# Patient Record
Sex: FEMALE | Race: WHITE | Employment: UNEMPLOYED | URBAN - METROPOLITAN AREA
[De-identification: names, ages, dates, MRNs, and addresses within clinical notes are randomized per-mention and may not be internally consistent; named-entity substitution may affect disease eponyms.]

---

## 2017-03-27 ENCOUNTER — TELEPHONE (OUTPATIENT)
Dept: CARDIOLOGY CLINIC | Age: 32
End: 2017-03-27

## 2017-03-27 ENCOUNTER — OFFICE VISIT (OUTPATIENT)
Dept: CARDIOLOGY CLINIC | Age: 32
End: 2017-03-27

## 2017-03-27 DIAGNOSIS — R07.9 CHEST PAIN, UNSPECIFIED TYPE: Primary | ICD-10-CM

## 2017-03-27 PROCEDURE — 99213 OFFICE O/P EST LOW 20 MIN: CPT | Performed by: INTERNAL MEDICINE

## 2017-03-27 PROCEDURE — 93000 ELECTROCARDIOGRAM COMPLETE: CPT | Performed by: INTERNAL MEDICINE

## 2017-04-04 ENCOUNTER — TELEPHONE (OUTPATIENT)
Dept: CARDIOLOGY CLINIC | Age: 32
End: 2017-04-04

## 2017-04-26 ENCOUNTER — OFFICE VISIT (OUTPATIENT)
Dept: BARIATRICS/WEIGHT MGMT | Age: 32
End: 2017-04-26

## 2017-04-26 VITALS
WEIGHT: 293 LBS | HEIGHT: 72 IN | DIASTOLIC BLOOD PRESSURE: 59 MMHG | BODY MASS INDEX: 39.68 KG/M2 | HEART RATE: 91 BPM | SYSTOLIC BLOOD PRESSURE: 127 MMHG

## 2017-04-26 DIAGNOSIS — F41.9 ANXIETY: ICD-10-CM

## 2017-04-26 DIAGNOSIS — M25.561 BILATERAL CHRONIC KNEE PAIN: ICD-10-CM

## 2017-04-26 DIAGNOSIS — M25.562 BILATERAL CHRONIC KNEE PAIN: ICD-10-CM

## 2017-04-26 DIAGNOSIS — E66.01 MORBID OBESITY WITH BMI OF 50.0-59.9, ADULT (HCC): Primary | ICD-10-CM

## 2017-04-26 DIAGNOSIS — M19.90 ARTHRITIS: ICD-10-CM

## 2017-04-26 DIAGNOSIS — F32.9 REACTIVE DEPRESSION: ICD-10-CM

## 2017-04-26 DIAGNOSIS — R06.02 SHORTNESS OF BREATH ON EXERTION: ICD-10-CM

## 2017-04-26 DIAGNOSIS — M54.50 CHRONIC BILATERAL LOW BACK PAIN WITHOUT SCIATICA: ICD-10-CM

## 2017-04-26 DIAGNOSIS — R11.2 NAUSEA AND VOMITING IN ADULT: ICD-10-CM

## 2017-04-26 DIAGNOSIS — G89.29 BILATERAL CHRONIC KNEE PAIN: ICD-10-CM

## 2017-04-26 DIAGNOSIS — M25.552 PAIN OF BOTH HIP JOINTS: ICD-10-CM

## 2017-04-26 DIAGNOSIS — G89.29 CHRONIC BILATERAL LOW BACK PAIN WITHOUT SCIATICA: ICD-10-CM

## 2017-04-26 DIAGNOSIS — G44.219 EPISODIC TENSION-TYPE HEADACHE, NOT INTRACTABLE: ICD-10-CM

## 2017-04-26 DIAGNOSIS — K59.03 DRUG-INDUCED CONSTIPATION: ICD-10-CM

## 2017-04-26 DIAGNOSIS — R19.7 DIARRHEA, UNSPECIFIED TYPE: ICD-10-CM

## 2017-04-26 DIAGNOSIS — F41.0 PANIC ATTACK: ICD-10-CM

## 2017-04-26 DIAGNOSIS — G47.33 OSA (OBSTRUCTIVE SLEEP APNEA): ICD-10-CM

## 2017-04-26 DIAGNOSIS — M25.551 PAIN OF BOTH HIP JOINTS: ICD-10-CM

## 2017-04-26 DIAGNOSIS — J45.20 MILD INTERMITTENT ASTHMA WITHOUT COMPLICATION: ICD-10-CM

## 2017-04-26 PROCEDURE — 99205 OFFICE O/P NEW HI 60 MIN: CPT | Performed by: SURGERY

## 2017-04-27 PROBLEM — K59.03 DRUG-INDUCED CONSTIPATION: Status: ACTIVE | Noted: 2017-04-27

## 2017-04-27 PROBLEM — F32.9 REACTIVE DEPRESSION: Status: ACTIVE | Noted: 2017-04-27

## 2017-04-27 PROBLEM — E66.01 MORBID OBESITY WITH BMI OF 50.0-59.9, ADULT (HCC): Status: ACTIVE | Noted: 2017-04-27

## 2017-04-27 PROBLEM — M19.90 ARTHRITIS: Status: ACTIVE | Noted: 2017-04-27

## 2017-04-27 PROBLEM — R06.02 SHORTNESS OF BREATH ON EXERTION: Status: ACTIVE | Noted: 2017-04-27

## 2017-04-27 PROBLEM — J45.20 MILD INTERMITTENT ASTHMA WITHOUT COMPLICATION: Status: ACTIVE | Noted: 2017-04-27

## 2017-04-27 PROBLEM — R19.7 DIARRHEA: Status: ACTIVE | Noted: 2017-04-27

## 2017-04-27 PROBLEM — G44.219 EPISODIC TENSION-TYPE HEADACHE, NOT INTRACTABLE: Status: ACTIVE | Noted: 2017-04-27

## 2017-04-27 PROBLEM — F41.9 ANXIETY: Status: ACTIVE | Noted: 2017-04-27

## 2017-04-27 PROBLEM — G89.29 BILATERAL CHRONIC KNEE PAIN: Status: ACTIVE | Noted: 2017-04-27

## 2017-04-27 PROBLEM — F41.0 PANIC ATTACK: Status: ACTIVE | Noted: 2017-04-27

## 2017-04-27 PROBLEM — M25.552 PAIN OF BOTH HIP JOINTS: Status: ACTIVE | Noted: 2017-04-27

## 2017-04-27 PROBLEM — M25.551 PAIN OF BOTH HIP JOINTS: Status: ACTIVE | Noted: 2017-04-27

## 2017-04-27 PROBLEM — M54.50 CHRONIC BILATERAL LOW BACK PAIN WITHOUT SCIATICA: Status: ACTIVE | Noted: 2017-04-27

## 2017-04-27 PROBLEM — M25.562 BILATERAL CHRONIC KNEE PAIN: Status: ACTIVE | Noted: 2017-04-27

## 2017-04-27 PROBLEM — R11.2 NAUSEA AND VOMITING IN ADULT: Status: ACTIVE | Noted: 2017-04-27

## 2017-04-27 PROBLEM — G89.29 CHRONIC BILATERAL LOW BACK PAIN WITHOUT SCIATICA: Status: ACTIVE | Noted: 2017-04-27

## 2017-04-27 PROBLEM — G47.33 OSA (OBSTRUCTIVE SLEEP APNEA): Status: ACTIVE | Noted: 2017-04-27

## 2017-04-27 PROBLEM — M25.561 BILATERAL CHRONIC KNEE PAIN: Status: ACTIVE | Noted: 2017-04-27

## 2017-04-28 ASSESSMENT — ENCOUNTER SYMPTOMS
ORTHOPNEA: 0
VOMITING: 0
STRIDOR: 0
DIARRHEA: 0
EYE DISCHARGE: 0
PHOTOPHOBIA: 0
SORE THROAT: 0
ABDOMINAL PAIN: 1
SPUTUM PRODUCTION: 0
COUGH: 0
HEMOPTYSIS: 0
BACK PAIN: 1
WHEEZING: 0
BLURRED VISION: 0
EYE REDNESS: 0
BLOOD IN STOOL: 0
HEARTBURN: 1
DOUBLE VISION: 0
EYE PAIN: 0
CONSTIPATION: 0
SHORTNESS OF BREATH: 1
NAUSEA: 1

## 2017-05-04 ENCOUNTER — PROCEDURE VISIT (OUTPATIENT)
Dept: CARDIOLOGY CLINIC | Age: 32
End: 2017-05-04

## 2017-05-04 DIAGNOSIS — R07.9 CHEST PAIN, UNSPECIFIED TYPE: Primary | ICD-10-CM

## 2017-05-04 PROCEDURE — 93015 CV STRESS TEST SUPVJ I&R: CPT | Performed by: INTERNAL MEDICINE

## 2017-05-11 ENCOUNTER — HOSPITAL ENCOUNTER (OUTPATIENT)
Dept: CARDIOLOGY | Age: 32
Discharge: HOME | End: 2017-06-09
Attending: INTERNAL MEDICINE | Admitting: INTERNAL MEDICINE

## 2017-07-13 ENCOUNTER — HOSPITAL ENCOUNTER (OUTPATIENT)
Dept: PHYSICAL THERAPY | Age: 32
Discharge: HOME | End: 2017-08-31
Attending: PAIN MEDICINE | Admitting: PAIN MEDICINE

## 2017-08-02 ENCOUNTER — OFFICE VISIT (OUTPATIENT)
Dept: BARIATRICS/WEIGHT MGMT | Age: 32
End: 2017-08-02

## 2017-08-02 VITALS
DIASTOLIC BLOOD PRESSURE: 59 MMHG | SYSTOLIC BLOOD PRESSURE: 109 MMHG | HEART RATE: 75 BPM | HEIGHT: 71 IN | BODY MASS INDEX: 41.02 KG/M2 | WEIGHT: 293 LBS | RESPIRATION RATE: 20 BRPM

## 2017-08-02 DIAGNOSIS — O09.299 PREGNANCY WITH POOR OBSTETRIC HISTORY: ICD-10-CM

## 2017-08-02 DIAGNOSIS — F41.9 ANXIETY: Primary | ICD-10-CM

## 2017-08-02 DIAGNOSIS — G44.219 EPISODIC TENSION-TYPE HEADACHE, NOT INTRACTABLE: ICD-10-CM

## 2017-08-02 DIAGNOSIS — R11.2 NAUSEA AND VOMITING IN ADULT: ICD-10-CM

## 2017-08-02 DIAGNOSIS — F41.0 PANIC ATTACK: ICD-10-CM

## 2017-08-02 DIAGNOSIS — M25.562 BILATERAL CHRONIC KNEE PAIN: ICD-10-CM

## 2017-08-02 DIAGNOSIS — M19.90 ARTHRITIS: ICD-10-CM

## 2017-08-02 DIAGNOSIS — O99.891 BACK PAIN IN PREGNANCY: ICD-10-CM

## 2017-08-02 DIAGNOSIS — R19.7 DIARRHEA, UNSPECIFIED TYPE: ICD-10-CM

## 2017-08-02 DIAGNOSIS — O99.213 OBESITY COMPLICATING PREGNANCY IN THIRD TRIMESTER: ICD-10-CM

## 2017-08-02 DIAGNOSIS — G89.29 BILATERAL CHRONIC KNEE PAIN: ICD-10-CM

## 2017-08-02 DIAGNOSIS — R06.02 SHORTNESS OF BREATH ON EXERTION: ICD-10-CM

## 2017-08-02 DIAGNOSIS — G47.33 OSA (OBSTRUCTIVE SLEEP APNEA): ICD-10-CM

## 2017-08-02 DIAGNOSIS — K59.03 DRUG-INDUCED CONSTIPATION: ICD-10-CM

## 2017-08-02 DIAGNOSIS — E66.01 MORBID OBESITY WITH BMI OF 50.0-59.9, ADULT (HCC): ICD-10-CM

## 2017-08-02 DIAGNOSIS — M25.551 PAIN OF BOTH HIP JOINTS: ICD-10-CM

## 2017-08-02 DIAGNOSIS — M25.561 BILATERAL CHRONIC KNEE PAIN: ICD-10-CM

## 2017-08-02 DIAGNOSIS — G89.29 CHRONIC BILATERAL LOW BACK PAIN WITHOUT SCIATICA: ICD-10-CM

## 2017-08-02 DIAGNOSIS — M54.50 CHRONIC BILATERAL LOW BACK PAIN WITHOUT SCIATICA: ICD-10-CM

## 2017-08-02 DIAGNOSIS — J45.20 MILD INTERMITTENT ASTHMA WITHOUT COMPLICATION: ICD-10-CM

## 2017-08-02 DIAGNOSIS — M54.9 BACK PAIN IN PREGNANCY: ICD-10-CM

## 2017-08-02 DIAGNOSIS — M25.552 PAIN OF BOTH HIP JOINTS: ICD-10-CM

## 2017-08-02 DIAGNOSIS — F32.9 REACTIVE DEPRESSION: ICD-10-CM

## 2017-08-02 DIAGNOSIS — R07.9 CHEST PAIN, UNSPECIFIED TYPE: ICD-10-CM

## 2017-08-02 PROCEDURE — 99214 OFFICE O/P EST MOD 30 MIN: CPT | Performed by: SURGERY

## 2017-08-02 ASSESSMENT — ENCOUNTER SYMPTOMS
ABDOMINAL PAIN: 1
SHORTNESS OF BREATH: 1
BACK PAIN: 1
ABDOMINAL DISTENTION: 1

## 2017-08-03 ASSESSMENT — PAIN DESCRIPTION - DESCRIPTORS: DESCRIPTORS: ACHING;RADIATING;BURNING

## 2017-08-03 ASSESSMENT — PAIN SCALES - GENERAL: PAINLEVEL_OUTOF10: 7

## 2017-08-03 ASSESSMENT — PAIN DESCRIPTION - LOCATION: LOCATION: NECK;BACK;SHOULDER

## 2017-08-03 ASSESSMENT — PAIN DESCRIPTION - ORIENTATION: ORIENTATION: RIGHT;LEFT;MID;LOWER

## 2017-08-03 ASSESSMENT — PAIN DESCRIPTION - PAIN TYPE: TYPE: CHRONIC PAIN

## 2017-08-03 ASSESSMENT — PAIN DESCRIPTION - FREQUENCY: FREQUENCY: CONTINUOUS

## 2017-08-22 ENCOUNTER — HOSPITAL ENCOUNTER (OUTPATIENT)
Dept: PHYSICAL THERAPY | Age: 32
Discharge: HOME | End: 2017-08-22
Attending: PAIN MEDICINE | Admitting: PAIN MEDICINE

## 2017-09-01 ENCOUNTER — HOSPITAL ENCOUNTER (OUTPATIENT)
Dept: OTHER | Age: 32
Discharge: HOME | End: 2017-09-08
Attending: PAIN MEDICINE | Admitting: PAIN MEDICINE

## 2017-09-08 ENCOUNTER — OFFICE VISIT (OUTPATIENT)
Dept: BARIATRICS/WEIGHT MGMT | Age: 32
End: 2017-09-08

## 2017-09-08 VITALS
SYSTOLIC BLOOD PRESSURE: 122 MMHG | WEIGHT: 293 LBS | BODY MASS INDEX: 41.02 KG/M2 | HEIGHT: 71 IN | HEART RATE: 76 BPM | DIASTOLIC BLOOD PRESSURE: 58 MMHG

## 2017-09-08 VITALS
HEART RATE: 76 BPM | HEIGHT: 71 IN | DIASTOLIC BLOOD PRESSURE: 58 MMHG | WEIGHT: 293 LBS | BODY MASS INDEX: 41.02 KG/M2 | SYSTOLIC BLOOD PRESSURE: 122 MMHG

## 2017-09-08 DIAGNOSIS — E66.01 MORBID OBESITY WITH BMI OF 50.0-59.9, ADULT (HCC): Primary | ICD-10-CM

## 2017-09-08 DIAGNOSIS — Z72.0 TOBACCO ABUSE: ICD-10-CM

## 2017-09-08 DIAGNOSIS — F32.9 REACTIVE DEPRESSION: ICD-10-CM

## 2017-09-08 DIAGNOSIS — G47.33 OSA (OBSTRUCTIVE SLEEP APNEA): ICD-10-CM

## 2017-09-08 DIAGNOSIS — Z01.818 PRE-OP EVALUATION: ICD-10-CM

## 2017-09-08 DIAGNOSIS — M54.50 CHRONIC BILATERAL LOW BACK PAIN WITHOUT SCIATICA: ICD-10-CM

## 2017-09-08 DIAGNOSIS — G89.29 CHRONIC BILATERAL LOW BACK PAIN WITHOUT SCIATICA: ICD-10-CM

## 2017-09-08 PROCEDURE — 99999 PR OFFICE/OUTPT VISIT,PROCEDURE ONLY: CPT

## 2017-09-08 PROCEDURE — 99214 OFFICE O/P EST MOD 30 MIN: CPT | Performed by: NURSE PRACTITIONER

## 2017-09-08 RX ORDER — CLONIDINE HYDROCHLORIDE 0.1 MG/1
1 TABLET ORAL NIGHTLY
COMMUNITY
Start: 2017-08-23 | End: 2017-12-16

## 2017-09-08 ASSESSMENT — ENCOUNTER SYMPTOMS
WHEEZING: 0
ABDOMINAL PAIN: 0
BACK PAIN: 1
RHINORRHEA: 0
ABDOMINAL DISTENTION: 0
NAUSEA: 0
CHEST TIGHTNESS: 0
DIARRHEA: 0
SHORTNESS OF BREATH: 0
TROUBLE SWALLOWING: 0
EYE PAIN: 0

## 2017-11-06 ENCOUNTER — OFFICE VISIT (OUTPATIENT)
Dept: BARIATRICS/WEIGHT MGMT | Age: 32
End: 2017-11-06

## 2017-11-06 VITALS
HEIGHT: 72 IN | SYSTOLIC BLOOD PRESSURE: 138 MMHG | DIASTOLIC BLOOD PRESSURE: 90 MMHG | OXYGEN SATURATION: 96 % | HEART RATE: 75 BPM | WEIGHT: 293 LBS | BODY MASS INDEX: 39.68 KG/M2

## 2017-11-06 DIAGNOSIS — Z01.818 PREOPERATIVE EVALUATION TO RULE OUT SURGICAL CONTRAINDICATION: ICD-10-CM

## 2017-11-06 DIAGNOSIS — Z72.0 TOBACCO ABUSE: ICD-10-CM

## 2017-11-06 DIAGNOSIS — G47.33 OBSTRUCTIVE SLEEP APNEA: ICD-10-CM

## 2017-11-06 DIAGNOSIS — E66.01 MORBID OBESITY WITH BMI OF 50.0-59.9, ADULT (HCC): Primary | ICD-10-CM

## 2017-11-06 DIAGNOSIS — M54.50 CHRONIC BILATERAL LOW BACK PAIN WITHOUT SCIATICA: ICD-10-CM

## 2017-11-06 DIAGNOSIS — G89.29 CHRONIC BILATERAL LOW BACK PAIN WITHOUT SCIATICA: ICD-10-CM

## 2017-11-06 DIAGNOSIS — F32.9 REACTIVE DEPRESSION: ICD-10-CM

## 2017-11-06 PROCEDURE — 99214 OFFICE O/P EST MOD 30 MIN: CPT | Performed by: NURSE PRACTITIONER

## 2017-11-06 PROCEDURE — 4004F PT TOBACCO SCREEN RCVD TLK: CPT | Performed by: NURSE PRACTITIONER

## 2017-11-06 PROCEDURE — G8417 CALC BMI ABV UP PARAM F/U: HCPCS | Performed by: NURSE PRACTITIONER

## 2017-11-06 PROCEDURE — G8427 DOCREV CUR MEDS BY ELIG CLIN: HCPCS | Performed by: NURSE PRACTITIONER

## 2017-11-06 PROCEDURE — G8484 FLU IMMUNIZE NO ADMIN: HCPCS | Performed by: NURSE PRACTITIONER

## 2017-11-06 RX ORDER — CITALOPRAM 10 MG/1
10 TABLET ORAL DAILY
COMMUNITY
End: 2017-12-16

## 2017-11-06 ASSESSMENT — ENCOUNTER SYMPTOMS
NAUSEA: 0
CHEST TIGHTNESS: 0
ABDOMINAL PAIN: 0
SHORTNESS OF BREATH: 0
ANAL BLEEDING: 0
ABDOMINAL DISTENTION: 0
COUGH: 0
CHOKING: 0
EYE PAIN: 0
COLOR CHANGE: 0
DIARRHEA: 0
EYE DISCHARGE: 0
BACK PAIN: 1
CONSTIPATION: 0
EYE ITCHING: 0
TROUBLE SWALLOWING: 0

## 2017-11-06 NOTE — PROGRESS NOTES
BARIATRIC SURGERY OFFICE NOTE    SUBJECTIVE:    Patient presenting today referred from Delfino Barrera MD, for   Chief Complaint   Patient presents with   [facility] Weight Management     4th visit    Other     needs new referral to Psychologist- Patient unhappy with current provider   . Vitals:    11/06/17 1437   BP: (!) 138/90   Pulse: 75   SpO2: 96%        BMI: Body mass index is 55.13 kg/m². Obesity Classification: III Morbid Obesity. Weight History: Wt Readings from Last 3 Encounters:   11/06/17 (!) 406 lb 8 oz (184.4 kg)   09/08/17 (!) 410 lb 9.6 oz (186.2 kg)   09/08/17 (!) 410 lb 9.6 oz (186.2 kg)       HPI: Aiden Jacques is a 33 y.o. female presenting in fourth bariatric visit, follow up diet and exercise - pre-operative weight loss, in consideration for bariatric surgery. Total weight loss/gain -3.9 Lbs over one month. Weight History: Wt Readings from Last 3 Encounters:   11/06/17 (!) 406 lb 8 oz (184.4 kg)   09/08/17 (!) 410 lb 9.6 oz (186.2 kg)   09/08/17 (!) 410 lb 9.6 oz (186.2 kg)     Total weight loss 8.9 Lbs over 2 month. Patient dines out to a sit down restaurant 1 times per month. Patient eats fast food meals 4 times per week. Drinks mostly Coffee, soda    24 hour recall/food frequency chart:  Breakfast: 1 pancake, without syrup  Snack: chicken nugget  Lunch: none  Snack: candy bar  Dinner: tacos  Snack: none    Total daily calories: 2,000 or more      She has stopped drinking [employer] and drinking diet soda (three or four daily). Drinking more water at least 3-4 bottles. No grazing; occasional snacking with candy bar when she gets a sweet tooth. She has a hard time remembering to keep food diary. Exercises 60 mins everyday, \"chasing her two kids\". Recently started new job working at [employer]. She missed her Pulmonology apt on 10/18 and is pending reschedule with Dr. Paul Hickman  Cardiology stress test done and still needs to do the EKG for Cardiology clearance.   Labs ordered by Dr. Jose Guadalupe Cano in September but patient forgot to get them done. Her calorie goal is 1500. Struggled with doing the meals preparation and trying not to skip meals. Reports having a bad habit of eating late at night and going to bed. She continues to smoke a half pack per day. Offered smoking cessation classes and she declined. She has tried nicotine patches and gum with poor success. She is working on weaning off. Anxiety med/ ADHD med has been stopped by Psychologist at [facility]. On low dose of Celexa. She feels okay but still feels fidgety, nervous and jittery. Occasional panic attacks. Thoroughly reviewed the patient's medical history, family history, social history and review of systems with the patient today in the office. Please see medical record for pertinent positives.       Past Medical History:   Diagnosis Date    Anxiety     Anxiety disorder     Asthma     B[facility] [address] [address]     Depression     Drug addiction ([facility] [address]     states previous addiction to vicodin, denies current use    Headache(784.0)     History of exercise stress test 2017    treadmill    Miscarriage     x4      Patient Active Problem List   Diagnosis    Pregnancy with poor obstetric history    Obesity complicating pregnancy in third trimester    Back pain in pregnancy    Chest pain    Morbid obesity with BMI of 50.0-59.9, adult ([address]    Mild intermittent asthma without complication    Shortness of breath on exertion    BASHIR (obstructive sleep apnea)    Drug-induced constipation    Diarrhea    Arthritis    Bilateral chronic knee pain    Pain of both hip joints    Chronic bilateral low back pain without sciatica    Episodic tension-type headache, not intractable    Nausea and vomiting in adult    Reactive depression    Anxiety    Panic attack     Past Surgical History:   Procedure Laterality Date     SECTION      DILATION AND CURETTAGE OF UTERUS      TONSILLECTOMY      urinating, dysuria and frequency. Musculoskeletal: Positive for arthralgias, back pain and myalgias. Skin: Negative for color change, pallor and rash. Allergic/Immunologic: Negative for environmental allergies and food allergies. Neurological: Negative for dizziness, tremors, seizures and headaches. Hematological: Negative for adenopathy. Does not bruise/bleed easily. Psychiatric/Behavioral: Negative for behavioral problems, self-injury and suicidal ideas. The patient is nervous/anxious. OBJECTIVE:    BP (!) 138/90 (Site: Left Arm, Position: Sitting, Cuff Size: Large Adult)   Pulse 75   Ht 6' (1.829 m)   Wt (!) 406 lb 8 oz (184.4 kg)   SpO2 96%   BMI 55.13 kg/m²      Physical Exam   Constitutional: She is oriented to person, place, and time. She appears well-developed and well-nourished. HENT:   Head: Normocephalic and atraumatic. Eyes: Conjunctivae and EOM are normal. Pupils are equal, round, and reactive to light. Neck: Normal range of motion. Neck supple. No JVD present. No tracheal deviation present. No thyromegaly present. Cardiovascular: Normal rate, regular rhythm, normal heart sounds and intact distal pulses. Exam reveals no gallop and no friction rub. No murmur heard. Pulmonary/Chest: Effort normal and breath sounds normal. No respiratory distress. She has no wheezes. She has no rales. She exhibits no tenderness. Abdominal: Soft. Bowel sounds are normal. She exhibits no distension and no mass. There is no tenderness. There is no rebound and no guarding. Obese   Musculoskeletal: Normal range of motion. Lymphadenopathy:     She has no cervical adenopathy. Neurological: She is alert and oriented to person, place, and time. Skin: Skin is warm and dry. Psychiatric: She has a normal mood and affect. Vitals reviewed. ASSESSMENT & PLAN:    1. Morbid obesity with BMI of 50.0-59.9, adult ([facility] [address]  - Discussed the below recommendations in detail with patient.    - Needs to work on calorie counting with goal of 1500  - Goals will be no pop/increase water intake and tobacco cessation.   - Need improved compliance with labs and Pulmonology apt  - Follow up in 1 month.      2. Tobacco abuse  -Patient counseled in length on smoking cessation including benefits of quitting and health risks of continuing to smoke including but not limited to lung cancer, COPD, risk of coronary artery disease. Patient verbalized understanding today. Does not want any interventions at this time. - Given [employer] handout.   - Aware needs to be smoke free AT LEAST 3 months prior to surgery.      3. BASHIR (obstructive sleep apnea)  - Will need pulmonary clearance. 4. Chronic bilateral low back pain without sciatica  - Chronic and needs weight loss. - Followed per PCP.      5. Reactive depression  - Continue with weight loss. - Managed per PCP. - Will need psych clearance in the future. There are no diagnoses linked to this encounter. Patient was encouraged to journal all food intake. Keep calorie level at approximately 7595-7347. Protein intake is to be a minimum of 40-50 grams per day. Water drinking was encouraged with a goal of 64oz-128oz daily. Beverages to be calorie free except for milk and avoid soda. Continue to increase level of physical activity. I spent 25 minutes with the patient face to face today and over 50% of the office visit today was spent in face to face counseling regarding diet and exercise, in preparation for her planned Robotic Sleeve Gastrectomy. Discussed in length complying with the dietary recommendations, complying with the preoperative workup including dietary counseling, exercise physiologist counseling, and pre-operative optimization of pulmonologist and cardiologist.  The patient expressed understanding and willingness to comply nicely; all questions and concerns addressed. No orders of the defined types were placed in this encounter.     No orders of the defined types were placed in this encounter. Follow Up:  No Follow-up on file.     Electronically signed by Abimael Fraga CNP on 11/6/2017 at 2:51 PM    Total daily calories: 2,000 or more      Exercises 60 mins everyday

## 2017-11-07 ENCOUNTER — TELEPHONE (OUTPATIENT)
Dept: BARIATRICS/WEIGHT MGMT | Age: 32
End: 2017-11-07

## 2017-11-07 DIAGNOSIS — Z01.818 PREOP TESTING: Primary | ICD-10-CM

## 2017-11-07 NOTE — TELEPHONE ENCOUNTER
Please notify patient that her labs have been ordered. Must be fasting prior to lab draw. Thank you.

## 2018-01-05 ENCOUNTER — OFFICE VISIT (OUTPATIENT)
Dept: BARIATRICS/WEIGHT MGMT | Age: 33
End: 2018-01-05

## 2018-01-05 VITALS
HEIGHT: 72 IN | HEART RATE: 79 BPM | DIASTOLIC BLOOD PRESSURE: 68 MMHG | SYSTOLIC BLOOD PRESSURE: 142 MMHG | WEIGHT: 293 LBS | BODY MASS INDEX: 39.68 KG/M2

## 2018-01-05 DIAGNOSIS — I15.9 SECONDARY HYPERTENSION: ICD-10-CM

## 2018-01-05 DIAGNOSIS — E66.01 MORBID OBESITY WITH BMI OF 50.0-59.9, ADULT (HCC): Primary | ICD-10-CM

## 2018-01-05 DIAGNOSIS — N39.3 SUI (STRESS URINARY INCONTINENCE, FEMALE): ICD-10-CM

## 2018-01-05 DIAGNOSIS — Z72.0 TOBACCO ABUSE: ICD-10-CM

## 2018-01-05 DIAGNOSIS — F41.9 ANXIETY: ICD-10-CM

## 2018-01-05 DIAGNOSIS — F32.9 REACTIVE DEPRESSION: ICD-10-CM

## 2018-01-05 PROCEDURE — G8427 DOCREV CUR MEDS BY ELIG CLIN: HCPCS | Performed by: NURSE PRACTITIONER

## 2018-01-05 PROCEDURE — 99214 OFFICE O/P EST MOD 30 MIN: CPT | Performed by: NURSE PRACTITIONER

## 2018-01-05 PROCEDURE — 4004F PT TOBACCO SCREEN RCVD TLK: CPT | Performed by: NURSE PRACTITIONER

## 2018-01-05 PROCEDURE — G8417 CALC BMI ABV UP PARAM F/U: HCPCS | Performed by: NURSE PRACTITIONER

## 2018-01-05 PROCEDURE — G8484 FLU IMMUNIZE NO ADMIN: HCPCS | Performed by: NURSE PRACTITIONER

## 2018-01-05 ASSESSMENT — ENCOUNTER SYMPTOMS
DIARRHEA: 0
ABDOMINAL PAIN: 0
EYE PAIN: 0
ABDOMINAL DISTENTION: 0
CHEST TIGHTNESS: 0
NAUSEA: 0
WHEEZING: 0
SHORTNESS OF BREATH: 0
BACK PAIN: 1
RHINORRHEA: 0
CONSTIPATION: 1
TROUBLE SWALLOWING: 0

## 2018-01-05 NOTE — PROGRESS NOTES
BARIATRIC SURGERY OFFICE NOTE    SUBJECTIVE:    Patient presenting today referred from Demetrice Billy MD, for   Chief Complaint   Patient presents with    Follow-up     Patient is here for 5th follow up visit for diet, exercise and weight loss. .    Vitals:    01/05/18 1302   BP: (!) 142/68   Pulse: 79        BMI: Body mass index is 55.57 kg/m². Obesity Classification: III Morbid Obesity. Weight History: Wt Readings from Last 3 Encounters:   01/05/18 (!) 409 lb 11.2 oz (185.8 kg)   12/16/17 (!) 406 lb (184.2 kg)   11/06/17 (!) 406 lb 8 oz (184.4 kg)       HPI: Patient is here for their fifth bariatric visit, follow up diet and exercise - pre-operative weight loss, in consideration for bariatric surgery. BMI: Body mass index is 55.57 kg/m². Obesity Classification: Class III Morbid Obesity. Total weight loss/gain +3.2 Lbs over 2 month. Patient dines out to a sit down restaurant 0 times per week. Patient eats fast food meals 1 times per day. Drinks mostly water and diet pop. 24 hour recall/food frequency chart:  Breakfast: none  Snack: none  Lunch: chicken sandwich  Snack: none  Dinner: burrito  Snack: none    Total daily calories: doesn't know, it's hard to keep track of them      Exercises:  No    Continues to smoke 1/2 PPD. Has not seen psych, continues to struggle with anxiety. No SI/HI but becomes very agitated at times. Recently started working at [employer]. Struggles with stress urinary incontinence- coughing/sneezing. HTN noted today, no HA, SOB or CP. Thoroughly reviewed the patient's medical history, family history, social history and review of systems with the patient today in the office. Please see medical record for pertinent positives.       Past Medical History:   Diagnosis Date    Anxiety     Anxiety disorder     Asthma     Bipolar disorder ([facility] [address]     Depression     Drug addiction ([facility] [address]     states previous addiction to vicodin, denies current use    TTFSUGMZ(938.7)     History of exercise stress test 2017    treadmill    Miscarriage     x4      Patient Active Problem List   Diagnosis    Pregnancy with poor obstetric history    Obesity complicating pregnancy in third trimester    Back pain in pregnancy    Chest pain    Morbid obesity with BMI of 50.0-59.9, adult ([facility] [address]    Mild intermittent asthma without complication    Shortness of breath on exertion    BASHIR (obstructive sleep apnea)    Drug-induced constipation    Diarrhea    Arthritis    Bilateral chronic knee pain    Pain of both hip joints    Chronic bilateral low back pain without sciatica    Episodic tension-type headache, not intractable    Nausea and vomiting in adult    Reactive depression    Anxiety    Panic attack    Secondary hypertension    GOGO (stress urinary incontinence, female)     Past Surgical History:   Procedure Laterality Date     SECTION      DILATION AND CURETTAGE OF UTERUS      TONSILLECTOMY      TOOTH EXTRACTION      TUBAL LIGATION       Current Outpatient Prescriptions   Medication Sig Dispense Refill    albuterol sulfate HFA (PROVENTIL HFA) 108 (90 Base) MCG/ACT inhaler Inhale 2 puffs into the lungs every 4 hours as needed for Wheezing or Shortness of Breath With spacer (and mask if indicated). Thanks.  1 Inhaler 1    Pseudoephedrine-DM-GG 30- MG CAPS Take 1 tablet by mouth 2 times daily as needed (cough or congestion) 20 capsule 0    naproxen (NAPROSYN) 500 MG tablet Take 1 tablet by mouth 2 times daily as needed for Pain 20 tablet 0    ondansetron (ZOFRAN) 4 MG tablet Take 1 tablet by mouth every 8 hours as needed for Nausea 10 tablet 0    ibuprofen (ADVIL;MOTRIN) 600 MG tablet Take 1 tablet by mouth every 6 hours as needed for Pain or Fever 60 tablet 0    butalbital-acetaminophen-caffeine (FIORICET) -40 MG per tablet Take 1 tablet by mouth every 4 hours as needed for Headaches 10 tablet 0    lidocaine (LIDODERM) 5 % depression  - secondary to life stressors and weight.   - Encouraged psych referral and further management. - Amb External Referral To Psychology    5. GOGO (stress urinary incontinence, female)  - Ongoing GOGO with coughing/sneezing.   - Discussed importance of weight loss and improvement in symptoms. 6. Secondary hypertension  - HTN Noted today at visit. - No HA, SOB or CP.   - Instructed to monitor and follow up with PCP regarding medication management. Patient was encouraged to journal all food intake. Keep calorie level at approximately [id number]. Protein intake is to be a minimum of 40-50 grams per day. Water drinking was encouraged with a goal of 64oz-128oz daily. Beverages to be calorie free except for milk and avoid soda. Continue to increase level of physical activity. I spent 25 minutes with the patient face to face today and over 50% of the office visit today was spent in face to face counseling regarding diet and exercise, in preparation for her planned Robotic Sleeve Gastrectomy. Discussed in length complying with the dietary recommendations, complying with the preoperative workup including dietary counseling completed, exercise physiologist counseling completed, and pre-operative optimization of pulmonologist which patient still needs and cardiologist appt. Needed as well. The patient expressed understanding and willingness to comply nicely; all questions and concerns addressed. No orders of the defined types were placed in this encounter. Orders Placed This Encounter   Procedures    Amb External Referral To Psychology     Referral Priority:   Routine     Referral Reason:   Specialty Services Required     Referred to Provider:   Miguelito Nielson     Requested Specialty:   Psychology     Number of Visits Requested:   1       Follow Up:  Return in about 1 month (around 2/5/2018).     Claudean Jan, CNP

## 2018-01-29 ENCOUNTER — TELEPHONE (OUTPATIENT)
Dept: BARIATRICS/WEIGHT MGMT | Age: 33
End: 2018-01-29

## 2018-02-19 ENCOUNTER — OFFICE VISIT (OUTPATIENT)
Dept: BARIATRICS/WEIGHT MGMT | Age: 33
End: 2018-02-19

## 2018-02-19 VITALS
BODY MASS INDEX: 41.02 KG/M2 | SYSTOLIC BLOOD PRESSURE: 138 MMHG | WEIGHT: 293 LBS | HEART RATE: 85 BPM | HEIGHT: 71 IN | DIASTOLIC BLOOD PRESSURE: 74 MMHG

## 2018-02-19 DIAGNOSIS — I15.9 SECONDARY HYPERTENSION: ICD-10-CM

## 2018-02-19 DIAGNOSIS — E66.01 MORBID OBESITY WITH BMI OF 50.0-59.9, ADULT (HCC): Primary | ICD-10-CM

## 2018-02-19 DIAGNOSIS — Z72.0 TOBACCO USE: ICD-10-CM

## 2018-02-19 PROCEDURE — G8484 FLU IMMUNIZE NO ADMIN: HCPCS | Performed by: NURSE PRACTITIONER

## 2018-02-19 PROCEDURE — G8417 CALC BMI ABV UP PARAM F/U: HCPCS | Performed by: NURSE PRACTITIONER

## 2018-02-19 PROCEDURE — 99214 OFFICE O/P EST MOD 30 MIN: CPT | Performed by: NURSE PRACTITIONER

## 2018-02-19 PROCEDURE — G8427 DOCREV CUR MEDS BY ELIG CLIN: HCPCS | Performed by: NURSE PRACTITIONER

## 2018-02-19 PROCEDURE — 4004F PT TOBACCO SCREEN RCVD TLK: CPT | Performed by: NURSE PRACTITIONER

## 2018-02-19 ASSESSMENT — ENCOUNTER SYMPTOMS
BACK PAIN: 1
RHINORRHEA: 0
ABDOMINAL DISTENTION: 0
TROUBLE SWALLOWING: 0
CHEST TIGHTNESS: 0
DIARRHEA: 0
EYE PAIN: 0
WHEEZING: 0
NAUSEA: 0
SHORTNESS OF BREATH: 0
ABDOMINAL PAIN: 0

## 2018-02-19 NOTE — PROGRESS NOTES
 Chest pain    Morbid obesity with BMI of 50.0-59.9, adult ([facility])    Mild intermittent asthma without complication    Shortness of breath on exertion    BASHIR (obstructive sleep apnea)    Drug-induced constipation    Diarrhea    Arthritis    Bilateral chronic knee pain    Pain of both hip joints    Chronic bilateral low back pain without sciatica    Episodic tension-type headache, not intractable    Nausea and vomiting in adult    Reactive depression    Anxiety    Panic attack    Secondary hypertension    GOGO (stress urinary incontinence, female)     Past Surgical History:   Procedure Laterality Date     SECTION      DILATION AND CURETTAGE OF UTERUS      TONSILLECTOMY  2002    TOOTH EXTRACTION      TUBAL LIGATION       Current Outpatient Prescriptions   Medication Sig Dispense Refill    ondansetron (ZOFRAN) 4 MG tablet Take 1 tablet by mouth every 8 hours as needed for Nausea 10 tablet 0    ibuprofen (ADVIL;MOTRIN) 600 MG tablet Take 1 tablet by mouth every 6 hours as needed for Pain or Fever 60 tablet 0    butalbital-acetaminophen-caffeine (FIORICET) -40 MG per tablet Take 1 tablet by mouth every 4 hours as needed for Headaches 10 tablet 0    lidocaine (LIDODERM) 5 % Place 1 patch onto the skin daily 12 hours on, 12 hours off.  gabapentin (NEURONTIN) 300 MG capsule Take 300 mg by mouth 3 times daily      oxyCODONE-acetaminophen (PERCOCET) 5-325 MG per tablet Take 1 tablet by mouth every 4 hours as needed for Pain      predniSONE (DELTASONE) 10 MG tablet 5 tablets PO Qday X 3, then 4 tablets PO Qday X 3, then 3 tablets PO Qday X 3, then 2 tablets PO Qday X 3, then 1 tablets PO Qday X 3, then stop. 45 tablet 0    albuterol sulfate HFA (PROVENTIL HFA) 108 (90 Base) MCG/ACT inhaler Inhale 2 puffs into the lungs every 4 hours as needed for Wheezing or Shortness of Breath With spacer (and mask if indicated). Thanks.  1 Inhaler 1    Pseudoephedrine-DM-GG 30- MG CAPS light.   Neck: Normal range of motion. Cardiovascular: Normal rate, regular rhythm and normal heart sounds. Pulmonary/Chest: Effort normal and breath sounds normal. She has no decreased breath sounds. She has no wheezes. She has no rhonchi. She has no rales. Abdominal: Soft. Bowel sounds are normal. There is no tenderness. Musculoskeletal: Normal range of motion. She exhibits no tenderness. In all 4 extremities. Neurological: She is alert and oriented to person, place, and time. She has normal strength. She exhibits normal muscle tone. GCS eye subscore is 4. GCS verbal subscore is 5. GCS motor subscore is 6. Skin: Skin is warm and dry. No rash noted. Psychiatric: She has a normal mood and affect. Her behavior is normal. Judgment normal.   Nursing note and vitals reviewed. ASSESSMENT & PLAN:    1. Morbid obesity with BMI of 50.0-59.9, adult ([facility] [address]  - Has lost significant amount of weight since last visit. - Choosing healthier items at work. - Still skipping meals- counseled. - re-iterated the below recommendations. 2. Tobacco use  - Smoking 1/2 ppd.   - Continues to smoke- discussed smoking cessation and importance prior to surgery. - Appears receptive to information but will not quit. - Again offered mediation support- declined. 3. Secondary hypertension  - Stable today at visit. - No Edema, SOB or headaches. Patient was encouraged to continue making better food choices and NOT skip meals. Protein intake is to be a minimum of 40-50 grams per day. Water drinking was encouraged with a goal of 64oz-128oz daily. Beverages to be calorie free except for milk and avoid soda. Continue to increase level of physical activity as tolerated. I spent 25 minutes with the patient face to face today and over 50% of the office visit today was spent in face to face counseling regarding diet and exercise, in preparation for her planned Robotic Sleeve Gastrectomy.   Discussed in length complying with the dietary recommendations, complying with the preoperative workup including dietary counseling completed, exercise physiologist counseling completed, and pre-operative optimization of pulmonologist in process and cardiologist in process- patient slow to follow up and schedule for clearances prior to surgery. The patient expressed understanding and willingness to comply nicely; all questions and concerns addressed. No orders of the defined types were placed in this encounter. No orders of the defined types were placed in this encounter. Follow Up:  Return in about 1 month (around 3/19/2018).     Travis Snowden, CNP

## 2018-02-20 ENCOUNTER — HOSPITAL ENCOUNTER (OUTPATIENT)
Dept: MRI IMAGING | Age: 33
Discharge: HOME | End: 2018-02-20
Attending: ORTHOPAEDIC SURGERY | Admitting: ORTHOPAEDIC SURGERY

## 2018-02-20 DIAGNOSIS — M25.461 EFFUSION OF RIGHT KNEE: ICD-10-CM

## 2018-02-20 DIAGNOSIS — M25.461 SWELLING OF RIGHT KNEE JOINT: ICD-10-CM

## 2018-03-26 ENCOUNTER — OFFICE VISIT (OUTPATIENT)
Dept: BARIATRICS/WEIGHT MGMT | Age: 33
End: 2018-03-26

## 2018-03-26 VITALS
BODY MASS INDEX: 41.02 KG/M2 | WEIGHT: 293 LBS | HEIGHT: 71 IN | SYSTOLIC BLOOD PRESSURE: 142 MMHG | DIASTOLIC BLOOD PRESSURE: 70 MMHG | HEART RATE: 72 BPM

## 2018-03-26 DIAGNOSIS — F41.9 ANXIETY: ICD-10-CM

## 2018-03-26 DIAGNOSIS — R10.11 RUQ PAIN: ICD-10-CM

## 2018-03-26 DIAGNOSIS — E66.01 MORBID OBESITY WITH BMI OF 50.0-59.9, ADULT (HCC): Primary | ICD-10-CM

## 2018-03-26 PROCEDURE — 4004F PT TOBACCO SCREEN RCVD TLK: CPT | Performed by: NURSE PRACTITIONER

## 2018-03-26 PROCEDURE — G8484 FLU IMMUNIZE NO ADMIN: HCPCS | Performed by: NURSE PRACTITIONER

## 2018-03-26 PROCEDURE — G8427 DOCREV CUR MEDS BY ELIG CLIN: HCPCS | Performed by: NURSE PRACTITIONER

## 2018-03-26 PROCEDURE — G8417 CALC BMI ABV UP PARAM F/U: HCPCS | Performed by: NURSE PRACTITIONER

## 2018-03-26 PROCEDURE — 99213 OFFICE O/P EST LOW 20 MIN: CPT | Performed by: NURSE PRACTITIONER

## 2018-03-26 RX ORDER — FEEDER CONTAINER WITH PUMP SET
1 EACH MISCELLANEOUS DAILY
Qty: 32 CAN | Refills: 0 | Status: SHIPPED | OUTPATIENT
Start: 2018-03-26 | End: 2019-10-23

## 2018-03-26 ASSESSMENT — ENCOUNTER SYMPTOMS
ABDOMINAL DISTENTION: 0
RHINORRHEA: 0
WHEEZING: 0
DIARRHEA: 0
TROUBLE SWALLOWING: 0
EYE PAIN: 0
CHEST TIGHTNESS: 0
ABDOMINAL PAIN: 0
SHORTNESS OF BREATH: 0
NAUSEA: 0

## 2018-03-26 NOTE — PROGRESS NOTES
Patient dines out to a sit down restaurant 1 times per month. Patient eats fast food meals 2 times per week.       Drinks mostly water    24 hour recall/food frequency chart:  Breakfast: none  Snack: none  Lunch: hot dog  Snack: none  Dinner: tostada  Snack: none    Total daily calories: not calculating      Exercises : active with work and children
obstetric history    Obesity complicating pregnancy in third trimester    Back pain in pregnancy    Chest pain    Morbid obesity with BMI of 50.0-59.9, adult ([facility]    Mild intermittent asthma without complication    Shortness of breath on exertion    BASHIR (obstructive sleep apnea)    Drug-induced constipation    Diarrhea    Arthritis    Bilateral chronic knee pain    Pain of both hip joints    Chronic bilateral low back pain without sciatica    Episodic tension-type headache, not intractable    Nausea and vomiting in adult    Reactive depression    Anxiety    Panic attack    Secondary hypertension    GOGO (stress urinary incontinence, female)     Past Surgical History:   Procedure Laterality Date     SECTION      DILATION AND CURETTAGE OF UTERUS      TONSILLECTOMY  2002    TOOTH EXTRACTION      TUBAL LIGATION       Current Outpatient Prescriptions   Medication Sig Dispense Refill    Nutritional Supplements (ENSURE HIGH PROTEIN) LIQD Take 1 Can by mouth daily Replace 1 meal per day 32 Can 0    ondansetron (ZOFRAN) 4 MG tablet Take 1 tablet by mouth every 8 hours as needed for Nausea 10 tablet 0    ibuprofen (ADVIL;MOTRIN) 600 MG tablet Take 1 tablet by mouth every 6 hours as needed for Pain or Fever 60 tablet 0    butalbital-acetaminophen-caffeine (FIORICET) -40 MG per tablet Take 1 tablet by mouth every 4 hours as needed for Headaches 10 tablet 0    lidocaine (LIDODERM) 5 % Place 1 patch onto the skin daily 12 hours on, 12 hours off.  gabapentin (NEURONTIN) 300 MG capsule Take 300 mg by mouth 3 times daily      oxyCODONE-acetaminophen (PERCOCET) 5-325 MG per tablet Take 1 tablet by mouth every 4 hours as needed for Pain      predniSONE (DELTASONE) 10 MG tablet 5 tablets PO Qday X 3, then 4 tablets PO Qday X 3, then 3 tablets PO Qday X 3, then 2 tablets PO Qday X 3, then 1 tablets PO Qday X 3, then stop.  45 tablet 0    Pseudoephedrine-DM-GG 30- MG CAPS Take 1

## 2018-05-10 ENCOUNTER — OFFICE VISIT (OUTPATIENT)
Dept: BARIATRICS/WEIGHT MGMT | Age: 33
End: 2018-05-10

## 2018-05-10 VITALS
BODY MASS INDEX: 41.02 KG/M2 | HEIGHT: 71 IN | WEIGHT: 293 LBS | RESPIRATION RATE: 16 BRPM | HEART RATE: 65 BPM | SYSTOLIC BLOOD PRESSURE: 115 MMHG | DIASTOLIC BLOOD PRESSURE: 56 MMHG

## 2018-05-10 DIAGNOSIS — E66.01 MORBID OBESITY WITH BMI OF 50.0-59.9, ADULT (HCC): Primary | ICD-10-CM

## 2018-05-10 DIAGNOSIS — I15.9 SECONDARY HYPERTENSION: ICD-10-CM

## 2018-05-10 DIAGNOSIS — M19.90 ARTHRITIS: ICD-10-CM

## 2018-05-10 PROCEDURE — G8417 CALC BMI ABV UP PARAM F/U: HCPCS | Performed by: NURSE PRACTITIONER

## 2018-05-10 PROCEDURE — G8427 DOCREV CUR MEDS BY ELIG CLIN: HCPCS | Performed by: NURSE PRACTITIONER

## 2018-05-10 PROCEDURE — 99213 OFFICE O/P EST LOW 20 MIN: CPT | Performed by: NURSE PRACTITIONER

## 2018-05-10 PROCEDURE — 4004F PT TOBACCO SCREEN RCVD TLK: CPT | Performed by: NURSE PRACTITIONER

## 2018-05-10 ASSESSMENT — ENCOUNTER SYMPTOMS
DIARRHEA: 0
WHEEZING: 0
NAUSEA: 0
TROUBLE SWALLOWING: 0
RHINORRHEA: 0
EYE PAIN: 0
BACK PAIN: 1
ABDOMINAL DISTENTION: 0
ABDOMINAL PAIN: 0
CHEST TIGHTNESS: 0
SHORTNESS OF BREATH: 0

## 2018-06-13 ENCOUNTER — OFFICE VISIT (OUTPATIENT)
Dept: BARIATRICS/WEIGHT MGMT | Age: 33
End: 2018-06-13

## 2018-06-13 VITALS
SYSTOLIC BLOOD PRESSURE: 127 MMHG | HEIGHT: 71 IN | BODY MASS INDEX: 41.02 KG/M2 | DIASTOLIC BLOOD PRESSURE: 68 MMHG | WEIGHT: 293 LBS | RESPIRATION RATE: 16 BRPM | HEART RATE: 69 BPM

## 2018-06-13 DIAGNOSIS — M25.561 BILATERAL CHRONIC KNEE PAIN: ICD-10-CM

## 2018-06-13 DIAGNOSIS — R11.2 NAUSEA AND VOMITING IN ADULT: ICD-10-CM

## 2018-06-13 DIAGNOSIS — M25.552 PAIN OF BOTH HIP JOINTS: ICD-10-CM

## 2018-06-13 DIAGNOSIS — F41.0 PANIC ATTACK: ICD-10-CM

## 2018-06-13 DIAGNOSIS — M25.562 BILATERAL CHRONIC KNEE PAIN: ICD-10-CM

## 2018-06-13 DIAGNOSIS — M19.90 ARTHRITIS: ICD-10-CM

## 2018-06-13 DIAGNOSIS — M54.50 CHRONIC BILATERAL LOW BACK PAIN WITHOUT SCIATICA: ICD-10-CM

## 2018-06-13 DIAGNOSIS — G89.29 BILATERAL CHRONIC KNEE PAIN: ICD-10-CM

## 2018-06-13 DIAGNOSIS — F32.9 REACTIVE DEPRESSION: ICD-10-CM

## 2018-06-13 DIAGNOSIS — E66.01 MORBID OBESITY WITH BMI OF 50.0-59.9, ADULT (HCC): Primary | ICD-10-CM

## 2018-06-13 DIAGNOSIS — G47.33 OSA (OBSTRUCTIVE SLEEP APNEA): ICD-10-CM

## 2018-06-13 DIAGNOSIS — I15.9 SECONDARY HYPERTENSION: ICD-10-CM

## 2018-06-13 DIAGNOSIS — R19.7 DIARRHEA, UNSPECIFIED TYPE: ICD-10-CM

## 2018-06-13 DIAGNOSIS — F41.9 ANXIETY: ICD-10-CM

## 2018-06-13 DIAGNOSIS — G89.29 CHRONIC BILATERAL LOW BACK PAIN WITHOUT SCIATICA: ICD-10-CM

## 2018-06-13 DIAGNOSIS — M25.551 PAIN OF BOTH HIP JOINTS: ICD-10-CM

## 2018-06-13 PROCEDURE — G8427 DOCREV CUR MEDS BY ELIG CLIN: HCPCS | Performed by: SURGERY

## 2018-06-13 PROCEDURE — 4004F PT TOBACCO SCREEN RCVD TLK: CPT | Performed by: SURGERY

## 2018-06-13 PROCEDURE — 99214 OFFICE O/P EST MOD 30 MIN: CPT | Performed by: SURGERY

## 2018-06-13 PROCEDURE — G8417 CALC BMI ABV UP PARAM F/U: HCPCS | Performed by: SURGERY

## 2018-06-13 RX ORDER — VARENICLINE TARTRATE 25 MG
KIT ORAL
Qty: 42 TABLET | Refills: 3 | Status: SHIPPED | OUTPATIENT
Start: 2018-06-13 | End: 2018-07-31

## 2018-06-13 RX ORDER — HYDROCODONE BITARTRATE AND ACETAMINOPHEN 5; 325 MG/1; MG/1
1 TABLET ORAL EVERY 6 HOURS PRN
COMMUNITY
End: 2018-07-31 | Stop reason: ALTCHOICE

## 2018-06-13 ASSESSMENT — ENCOUNTER SYMPTOMS
DIARRHEA: 0
ANAL BLEEDING: 0
PHOTOPHOBIA: 0
ABDOMINAL DISTENTION: 1
VOICE CHANGE: 0
ABDOMINAL PAIN: 1
VOMITING: 0
CONSTIPATION: 0
COLOR CHANGE: 0
COUGH: 0
WHEEZING: 0
NAUSEA: 0
TROUBLE SWALLOWING: 0
SORE THROAT: 0
SHORTNESS OF BREATH: 1
BACK PAIN: 1
BLOOD IN STOOL: 0

## 2018-06-25 ENCOUNTER — OFFICE VISIT (OUTPATIENT)
Dept: CARDIOLOGY CLINIC | Age: 33
End: 2018-06-25

## 2018-06-25 VITALS
WEIGHT: 293 LBS | DIASTOLIC BLOOD PRESSURE: 68 MMHG | HEIGHT: 71 IN | BODY MASS INDEX: 41.02 KG/M2 | HEART RATE: 74 BPM | SYSTOLIC BLOOD PRESSURE: 124 MMHG

## 2018-06-25 DIAGNOSIS — Z01.818 PRE-OP EVALUATION: Primary | ICD-10-CM

## 2018-06-25 PROCEDURE — G8417 CALC BMI ABV UP PARAM F/U: HCPCS | Performed by: INTERNAL MEDICINE

## 2018-06-25 PROCEDURE — 99214 OFFICE O/P EST MOD 30 MIN: CPT | Performed by: INTERNAL MEDICINE

## 2018-06-25 PROCEDURE — 93000 ELECTROCARDIOGRAM COMPLETE: CPT | Performed by: INTERNAL MEDICINE

## 2018-06-25 PROCEDURE — G8427 DOCREV CUR MEDS BY ELIG CLIN: HCPCS | Performed by: INTERNAL MEDICINE

## 2018-06-25 PROCEDURE — 4004F PT TOBACCO SCREEN RCVD TLK: CPT | Performed by: INTERNAL MEDICINE

## 2018-06-26 ENCOUNTER — TELEPHONE (OUTPATIENT)
Dept: CARDIOLOGY CLINIC | Age: 33
End: 2018-06-26

## 2018-07-13 ENCOUNTER — TELEPHONE (OUTPATIENT)
Dept: BARIATRICS/WEIGHT MGMT | Age: 33
End: 2018-07-13

## 2018-07-13 NOTE — TELEPHONE ENCOUNTER
Patient returned call. Discuss psych evaluation and denial. Aware of recommendations for continued therapy etc. Given hx of bipolar. Patient states saw therapist at [address] [facility] in the past, recommended patient go back there and agreeable for the most part. Patient has contact information. Aware to call our office with any questions or concerns.

## 2018-07-13 NOTE — TELEPHONE ENCOUNTER
Attempted to call patient regarding Psych denial. Patient sleeping and message left with family member to call our office.

## 2018-07-25 ENCOUNTER — TELEPHONE (OUTPATIENT)
Dept: CARDIOLOGY CLINIC | Age: 33
End: 2018-07-25

## 2018-07-25 NOTE — TELEPHONE ENCOUNTER
Called pt to have her call central scheduling and schedule echo at [facility].   Gave pt the phone# to central scheduling [phone]

## 2018-07-31 ENCOUNTER — OFFICE VISIT (OUTPATIENT)
Dept: BARIATRICS/WEIGHT MGMT | Age: 33
End: 2018-07-31

## 2018-07-31 VITALS
HEART RATE: 66 BPM | HEIGHT: 71 IN | SYSTOLIC BLOOD PRESSURE: 132 MMHG | BODY MASS INDEX: 41.02 KG/M2 | WEIGHT: 293 LBS | DIASTOLIC BLOOD PRESSURE: 80 MMHG | RESPIRATION RATE: 20 BRPM

## 2018-07-31 DIAGNOSIS — F41.9 ANXIETY: ICD-10-CM

## 2018-07-31 DIAGNOSIS — F31.9 BIPOLAR 1 DISORDER (HCC): ICD-10-CM

## 2018-07-31 DIAGNOSIS — E66.01 MORBID OBESITY WITH BMI OF 50.0-59.9, ADULT (HCC): Primary | ICD-10-CM

## 2018-07-31 DIAGNOSIS — Z01.818 PRE-OP EVALUATION: ICD-10-CM

## 2018-07-31 PROCEDURE — G8417 CALC BMI ABV UP PARAM F/U: HCPCS | Performed by: NURSE PRACTITIONER

## 2018-07-31 PROCEDURE — 4004F PT TOBACCO SCREEN RCVD TLK: CPT | Performed by: NURSE PRACTITIONER

## 2018-07-31 PROCEDURE — 99213 OFFICE O/P EST LOW 20 MIN: CPT | Performed by: NURSE PRACTITIONER

## 2018-07-31 PROCEDURE — G8427 DOCREV CUR MEDS BY ELIG CLIN: HCPCS | Performed by: NURSE PRACTITIONER

## 2018-07-31 ASSESSMENT — ENCOUNTER SYMPTOMS
EYE PAIN: 0
SHORTNESS OF BREATH: 0
BACK PAIN: 1
ABDOMINAL PAIN: 0
WHEEZING: 0
TROUBLE SWALLOWING: 0
DIARRHEA: 0
ABDOMINAL DISTENTION: 0
NAUSEA: 0
CHEST TIGHTNESS: 0
RHINORRHEA: 0

## 2018-07-31 NOTE — PROGRESS NOTES
Patient dines out to a sit down restaurant 0 times per month. Patient eats fast food meals 5 times per week.       Drinks mostly diet soda    24 hour recall/food frequency chart:  Breakfast: nothing  Snack: none   Lunch: nothing  Snack: none  Dinner: Jh chicken, mashed potato and corn  Snack: chicken sandwich at 3am    Total daily calories: not calculating regularly      Exercises:  Not regularly (schedule is too busy)
and fever. HENT: Negative for congestion, dental problem, hearing loss, rhinorrhea and trouble swallowing. Eyes: Negative for pain. Respiratory: Negative for chest tightness, shortness of breath and wheezing. Cardiovascular: Negative for chest pain, palpitations and leg swelling. Gastrointestinal: Negative for abdominal distention, abdominal pain, diarrhea and nausea. Endocrine: Negative for cold intolerance and polydipsia. Genitourinary: Negative for difficulty urinating and frequency. Musculoskeletal: Positive for arthralgias and back pain. Negative for gait problem. Skin: Negative for rash. Allergic/Immunologic: Negative for environmental allergies. Neurological: Negative for dizziness, seizures and syncope. Hematological: Does not bruise/bleed easily. Psychiatric/Behavioral: Positive for agitation. Negative for behavioral problems, self-injury, sleep disturbance and suicidal ideas. The patient is hyperactive. OBJECTIVE:    /80 (Site: Right Arm, Position: Sitting, Cuff Size: Large Adult)   Pulse 66   Resp 20   Ht 5' 11\" (1.803 m)   Wt (!) 397 lb (180.1 kg)   BMI 55.37 kg/m²      Physical Exam   Constitutional: She is oriented to person, place, and time. She appears well-developed and well-nourished. Obese; poor hygiene. HENT:   Head: Normocephalic and atraumatic. Right Ear: Hearing and ear canal normal.   Left Ear: Hearing and ear canal normal.   Nose: Nose normal.   Mouth/Throat: Uvula is midline and oropharynx is clear and moist.   Eyes: Conjunctivae are normal. Pupils are equal, round, and reactive to light. Neck: Normal range of motion. Cardiovascular: Normal rate, regular rhythm and normal heart sounds. Pulmonary/Chest: Effort normal and breath sounds normal. She has no decreased breath sounds. She has no wheezes. She has no rhonchi. She has no rales. Abdominal: Soft. Bowel sounds are normal. There is no tenderness.    Musculoskeletal: Normal range

## 2018-08-28 ENCOUNTER — OFFICE VISIT (OUTPATIENT)
Dept: BARIATRICS/WEIGHT MGMT | Age: 33
End: 2018-08-28

## 2018-08-28 VITALS
SYSTOLIC BLOOD PRESSURE: 119 MMHG | RESPIRATION RATE: 16 BRPM | WEIGHT: 293 LBS | DIASTOLIC BLOOD PRESSURE: 61 MMHG | HEIGHT: 71 IN | BODY MASS INDEX: 41.02 KG/M2 | HEART RATE: 67 BPM

## 2018-08-28 DIAGNOSIS — E66.01 MORBID OBESITY WITH BMI OF 50.0-59.9, ADULT (HCC): Primary | ICD-10-CM

## 2018-08-28 DIAGNOSIS — F41.9 ANXIETY: ICD-10-CM

## 2018-08-28 PROCEDURE — G8417 CALC BMI ABV UP PARAM F/U: HCPCS | Performed by: NURSE PRACTITIONER

## 2018-08-28 PROCEDURE — 99213 OFFICE O/P EST LOW 20 MIN: CPT | Performed by: NURSE PRACTITIONER

## 2018-08-28 PROCEDURE — 4004F PT TOBACCO SCREEN RCVD TLK: CPT | Performed by: NURSE PRACTITIONER

## 2018-08-28 PROCEDURE — G8427 DOCREV CUR MEDS BY ELIG CLIN: HCPCS | Performed by: NURSE PRACTITIONER

## 2018-08-28 ASSESSMENT — ENCOUNTER SYMPTOMS
NAUSEA: 0
RHINORRHEA: 0
SHORTNESS OF BREATH: 0
DIARRHEA: 0
EYE PAIN: 0
WHEEZING: 0
TROUBLE SWALLOWING: 0
BACK PAIN: 1
CHEST TIGHTNESS: 0
ABDOMINAL DISTENTION: 0
ABDOMINAL PAIN: 0

## 2018-08-28 NOTE — PROGRESS NOTES
Patient dines out to a sit down restaurant 1 times per month. Patient eats fast food meals 3 times per week.       Drinks mostly water and diet soda    24 hour recall/food frequency chart:  Breakfast: sausage biscuits and gravy  Snack: none  Lunch: not sure \" I don't remember\"  Snack: none  Dinner: not sure \"I was at [employer]: none    Total daily calories: not calculating \"maybe about 2000\"      Exercises: \"josue after my kids\"
tenderness. In all 4 extremities. Neurological: She is alert and oriented to person, place, and time. She has normal strength. She exhibits normal muscle tone. GCS eye subscore is 4. GCS verbal subscore is 5. GCS motor subscore is 6. Skin: Skin is warm and dry. No rash noted. Psychiatric: She has a normal mood and affect. Her behavior is normal. Judgment normal.   Poor engagement. Nursing note and vitals reviewed. ASSESSMENT & PLAN:    1. Morbid obesity with BMI of 50.0-59.9, adult ([facility]  - No consistency in program with appts. Or weight loss. - will schedule with RD, counseled on no show policy etc.   - Still smoking, down to 1/2 ppd- did not like chantix. - States will quit on her own. - Follow up in 1 month. 2. Pre-op evaluation  - Needs pulmonary clearance. - She is currently scheduled. 3. Anxiety  - Ongoing and chronic, needs therapy. - Psych not cleared thus far. - Psych is scheduled and also with Dr. Melo Quinonez. 4. Bipolar 1 disorder ([facility].)  - Not currently on medication, discussed in length. - Amb External Referral To Psychology  - No SI/HI. Discussed in length complying with the dietary recommendations, complying with the preoperative workup including dietary counseling completed, exercise physiologist counseling completed, and pre-operative optimization of pulmonologist in process and cardiologist in process. The patient expressed understanding and willingness to comply nicely; all questions and concerns addressed. No orders of the defined types were placed in this encounter. No orders of the defined types were placed in this encounter. Follow Up:  Return in about 1 month (around 9/28/2018).     Azalia Torres, CNP

## 2018-09-25 ENCOUNTER — OFFICE VISIT (OUTPATIENT)
Dept: BARIATRICS/WEIGHT MGMT | Age: 33
End: 2018-09-25

## 2018-09-25 ENCOUNTER — OFFICE VISIT (OUTPATIENT)
Dept: BARIATRICS/WEIGHT MGMT | Age: 33
End: 2018-09-25
Payer: COMMERCIAL

## 2018-09-25 VITALS
HEART RATE: 76 BPM | SYSTOLIC BLOOD PRESSURE: 117 MMHG | DIASTOLIC BLOOD PRESSURE: 58 MMHG | HEIGHT: 71 IN | RESPIRATION RATE: 16 BRPM | WEIGHT: 293 LBS | BODY MASS INDEX: 41.02 KG/M2

## 2018-09-25 VITALS — WEIGHT: 293 LBS | BODY MASS INDEX: 41.02 KG/M2 | HEIGHT: 71 IN

## 2018-09-25 DIAGNOSIS — G89.29 CHRONIC BILATERAL LOW BACK PAIN WITHOUT SCIATICA: ICD-10-CM

## 2018-09-25 DIAGNOSIS — M54.50 CHRONIC BILATERAL LOW BACK PAIN WITHOUT SCIATICA: ICD-10-CM

## 2018-09-25 DIAGNOSIS — E66.01 MORBID OBESITY DUE TO EXCESS CALORIES (HCC): Primary | ICD-10-CM

## 2018-09-25 DIAGNOSIS — E66.01 MORBID OBESITY WITH BMI OF 50.0-59.9, ADULT (HCC): ICD-10-CM

## 2018-09-25 DIAGNOSIS — Z72.0 TOBACCO USE: Primary | ICD-10-CM

## 2018-09-25 PROCEDURE — G8417 CALC BMI ABV UP PARAM F/U: HCPCS | Performed by: NURSE PRACTITIONER

## 2018-09-25 PROCEDURE — 99213 OFFICE O/P EST LOW 20 MIN: CPT | Performed by: NURSE PRACTITIONER

## 2018-09-25 PROCEDURE — 4004F PT TOBACCO SCREEN RCVD TLK: CPT | Performed by: NURSE PRACTITIONER

## 2018-09-25 PROCEDURE — 99999 PR OFFICE/OUTPT VISIT,PROCEDURE ONLY: CPT

## 2018-09-25 PROCEDURE — G8427 DOCREV CUR MEDS BY ELIG CLIN: HCPCS | Performed by: NURSE PRACTITIONER

## 2018-09-25 ASSESSMENT — ENCOUNTER SYMPTOMS
TROUBLE SWALLOWING: 0
RHINORRHEA: 0
DIARRHEA: 0
WHEEZING: 0
CHEST TIGHTNESS: 0
NAUSEA: 0
ABDOMINAL PAIN: 0
EYE PAIN: 0
BACK PAIN: 1
SHORTNESS OF BREATH: 0
ABDOMINAL DISTENTION: 0

## 2018-09-25 NOTE — PROGRESS NOTES
Outpatient Nutrition Counseling    REASON FOR VISIT: F/U Nutrition Counseling    Chief Complaint:    Chief Complaint   Patient presents with    Weight Management       SUBJECTIVE:  Pt here for f/u visit. Has gained 10 lbs last month - now back down. Pt reports she is not counting calories as she does not have time. Pt reports she knows everything she needs to know from watching relatives have the surgery. Pt not overly receptive to suggestions. Pt usually still skips breakfast. Has switched to diet soda. Eats fast foods and eats late at night often. Encouraged pt to drink more water and focus on choosing more protein based foods and decreasing portions of fatty and starchy foods. Pt also here for visit #12 with NP. The patient is a 29 y.o. female being seen for morbid obesity, considering weight loss surgery; Skylar's, Height: 5' 11\" (180.3 cm), Weight: (!) 396 lb 12.8 oz (180 kg), Current Body mass index is 55.34 kg/m². The patient's PCP is Alona Walsh MD     Comorbid Conditions:  Significant diseases affecting this patient are   Past Medical History:   Diagnosis Date    Anxiety     Anxiety disorder     Asthma     [facility] ([facility]     Depression     Drug addiction ([facility]     states previous addiction to vicodin, denies current use    Headache(784.0)     History of exercise stress test 2017    treadmill    Miscarriage     x4   . Review of Systems - ROS  Otherwise per HPI. Allergies:   Allergies   Allergen Reactions    Latex Hives    Ultram [Tramadol Hcl] Hives       Past Surgical History:  Past Surgical History:   Procedure Laterality Date     SECTION      DILATION AND CURETTAGE OF UTERUS      TONSILLECTOMY  2002    TOOTH EXTRACTION      TUBAL LIGATION         Family History:  Family History   Problem Relation Age of Onset    Diabetes Mother     High Blood Pressure Mother     Heart Disease Mother     Heart Disease Maternal Grandmother     Heart Disease Maternal Grandfather        Social History:  Social History     Social History    Marital status: Single     Spouse name: N/A    Number of children: N/A    Years of education: N/A     Occupational History    Not on file. Social History Main Topics    Smoking status: Current Every Day Smoker     Packs/day: 0.50     Years: 9.00     Types: Cigarettes    Smokeless tobacco: Never Used    Alcohol use No    Drug use: No    Sexual activity: Yes     Partners: Male     Other Topics Concern    Not on file     Social History Narrative    No narrative on file         OBJECTIVE:  Physical Exam   Ht 5' 11\" (1.803 m)   Wt (!) 396 lb 12.8 oz (180 kg)   BMI 55.34 kg/m²        NUTRITION DIAGNOSIS: Overweight / Obesity   Problem: Increased adiposity compared to reference standard or established norms   Etiology: Excess intake compared to output over time   S/S: Ht: 71\" Wt: 396.8 lbs BMI: 55.34    NUTRITION INTERVENTIONS:    Individualized treatment goals to address nutrition diagnosis:   Instructed on water, protein and not skipping meals   Provided reinforcement of previous teaching   Encouraged Physical activity as approved by physician    MONITORING/ EVALUATION/ PLAN:   Pt verbalized understanding of all materials covered   Pt asked pertinent questions throughout the session - expect compliance with nutrition guidelines presented   Provided pt with contact information should questions arise prior to next visit   Will f/u with pt in PRN for further education and post-op diet instructions  ANA Rivera MS, RDN, LD  9/25/2018

## 2018-09-25 NOTE — PROGRESS NOTES
Patient dines out to a sit down restaurant 0 times per month. Patient eats fast food meals 1 times per day.       Drinks mostly water     24 hour recall/food frequency chart:  Breakfast: none  Snack: none  Lunch: spam sandwich  Snack: none  Dinner: bowl of chilli  Snack: cheese quesadilla    Total daily calories: not calculating      Exercises: \"chasing kids\"
types were placed in this encounter. Follow Up:  Return in about 1 month (around 10/25/2018).     Chinyere Ardon CNP

## 2018-09-27 ENCOUNTER — TELEPHONE (OUTPATIENT)
Dept: CARDIOLOGY CLINIC | Age: 33
End: 2018-09-27

## 2018-10-17 ENCOUNTER — OFFICE VISIT (OUTPATIENT)
Dept: PSYCHOLOGY | Age: 33
End: 2018-10-17
Payer: COMMERCIAL

## 2018-10-17 DIAGNOSIS — F32.A DEPRESSIVE DISORDER: Primary | ICD-10-CM

## 2018-10-17 DIAGNOSIS — F41.9 ANXIETY: ICD-10-CM

## 2018-10-17 PROCEDURE — 90791 PSYCH DIAGNOSTIC EVALUATION: CPT | Performed by: PSYCHOLOGIST

## 2018-10-17 ASSESSMENT — PATIENT HEALTH QUESTIONNAIRE - PHQ9
10. IF YOU CHECKED OFF ANY PROBLEMS, HOW DIFFICULT HAVE THESE PROBLEMS MADE IT FOR YOU TO DO YOUR WORK, TAKE CARE OF THINGS AT HOME, OR GET ALONG WITH OTHER PEOPLE: 2
5. POOR APPETITE OR OVEREATING: 1
1. LITTLE INTEREST OR PLEASURE IN DOING THINGS: 2
SUM OF ALL RESPONSES TO PHQ QUESTIONS 1-9: 13
8. MOVING OR SPEAKING SO SLOWLY THAT OTHER PEOPLE COULD HAVE NOTICED. OR THE OPPOSITE, BEING SO FIGETY OR RESTLESS THAT YOU HAVE BEEN MOVING AROUND A LOT MORE THAN USUAL: 2
SUM OF ALL RESPONSES TO PHQ QUESTIONS 1-9: 13
4. FEELING TIRED OR HAVING LITTLE ENERGY: 3
2. FEELING DOWN, DEPRESSED OR HOPELESS: 0
3. TROUBLE FALLING OR STAYING ASLEEP: 2
SUM OF ALL RESPONSES TO PHQ9 QUESTIONS 1 & 2: 2
9. THOUGHTS THAT YOU WOULD BE BETTER OFF DEAD, OR OF HURTING YOURSELF: 0
7. TROUBLE CONCENTRATING ON THINGS, SUCH AS READING THE NEWSPAPER OR WATCHING TELEVISION: 3
6. FEELING BAD ABOUT YOURSELF - OR THAT YOU ARE A FAILURE OR HAVE LET YOURSELF OR YOUR FAMILY DOWN: 0

## 2018-10-17 NOTE — PROGRESS NOTES
Behavioral Health Consultation  Omar Riley Psy.D. Psychologist    Time spent with Patient: 30 minutes  Visit number: 1  Reason for Consult:  depression, anxiety and weight loss  Referring Provider: MARU Varner CNP [address], [place] [address]    Informed consent:  Pt provided informed consent for the behavioral health program. Discussed with patient model of service to include the limits of confidentiality (i.e. abuse reporting, suicide intervention, etc.) and focused intervention approach. Pt indicated understanding. S:  ----------------------------------------------------------------------------------------------------------------------  Referred for intake by DORIE Velez following recommendation from pre-bariatric [facility] psych clearance assessment. Presenting problem:  \"I didn't clear my psych assessment and they want me to come see a therapist.\" Stated has h/o \"anxiety, bipolar, ADHD, and depression. \"     At present pt endorsed anhedonia, insomnia, anergia, overeating, diminished concentration, and psychomotor agitation. Also remarked, \"my anxiety is through the roof\" since being taken off all [facility] meds. Stated has been on xanax, ativan, valium, celexa, adderall. 10 yo daughter is a source of distress. Will occ \"go off the deep end. \" Stated she \"Went off the deep end last week and cussed out a manager at work. \" Anxiety will present as irritability and fidgetiness. \"I just feel like I'm on edge. \" [facility] is PCP and upon further discussion disclosed that she was started on prozac, however she elected to discontinue psychopharm treatment and not return to [address] [place] [address] \"becaue they told me I didn't need my valium and adderall. \"     Pt feels \"lonely. \" Tearful and emotionally activated throughout duration of visit. Labile mood. Social:   7yo daughter. 5yo daughter. Works at [employer]    Substance Use:   EtOH: denied  Cannabis: denied   Tobacco: . 5-1

## 2018-11-09 ENCOUNTER — TELEPHONE (OUTPATIENT)
Dept: CARDIOLOGY CLINIC | Age: 33
End: 2018-11-09

## 2018-11-16 ENCOUNTER — OFFICE VISIT (OUTPATIENT)
Dept: BARIATRICS/WEIGHT MGMT | Age: 33
End: 2018-11-16
Payer: COMMERCIAL

## 2018-11-16 VITALS
DIASTOLIC BLOOD PRESSURE: 68 MMHG | HEART RATE: 69 BPM | RESPIRATION RATE: 15 BRPM | OXYGEN SATURATION: 99 % | WEIGHT: 293 LBS | HEIGHT: 71 IN | BODY MASS INDEX: 41.02 KG/M2 | SYSTOLIC BLOOD PRESSURE: 124 MMHG

## 2018-11-16 DIAGNOSIS — G89.29 CHRONIC BILATERAL LOW BACK PAIN WITHOUT SCIATICA: ICD-10-CM

## 2018-11-16 DIAGNOSIS — E66.01 MORBID OBESITY WITH BMI OF 50.0-59.9, ADULT (HCC): Primary | ICD-10-CM

## 2018-11-16 DIAGNOSIS — M54.50 CHRONIC BILATERAL LOW BACK PAIN WITHOUT SCIATICA: ICD-10-CM

## 2018-11-16 PROCEDURE — 4004F PT TOBACCO SCREEN RCVD TLK: CPT | Performed by: NURSE PRACTITIONER

## 2018-11-16 PROCEDURE — G8484 FLU IMMUNIZE NO ADMIN: HCPCS | Performed by: NURSE PRACTITIONER

## 2018-11-16 PROCEDURE — G8427 DOCREV CUR MEDS BY ELIG CLIN: HCPCS | Performed by: NURSE PRACTITIONER

## 2018-11-16 PROCEDURE — 99213 OFFICE O/P EST LOW 20 MIN: CPT | Performed by: NURSE PRACTITIONER

## 2018-11-16 PROCEDURE — G8417 CALC BMI ABV UP PARAM F/U: HCPCS | Performed by: NURSE PRACTITIONER

## 2018-11-16 ASSESSMENT — ENCOUNTER SYMPTOMS
NAUSEA: 0
RHINORRHEA: 0
BACK PAIN: 1
SHORTNESS OF BREATH: 0
WHEEZING: 0
ABDOMINAL DISTENTION: 0
TROUBLE SWALLOWING: 0
DIARRHEA: 0
CHEST TIGHTNESS: 0
EYE PAIN: 0
ABDOMINAL PAIN: 0

## 2018-12-04 ENCOUNTER — OFFICE VISIT (OUTPATIENT)
Dept: BARIATRICS/WEIGHT MGMT | Age: 33
End: 2018-12-04

## 2018-12-04 VITALS
HEIGHT: 71 IN | BODY MASS INDEX: 41.02 KG/M2 | DIASTOLIC BLOOD PRESSURE: 62 MMHG | WEIGHT: 293 LBS | HEART RATE: 83 BPM | SYSTOLIC BLOOD PRESSURE: 127 MMHG

## 2018-12-04 DIAGNOSIS — E66.01 MORBID OBESITY WITH BMI OF 50.0-59.9, ADULT (HCC): Primary | ICD-10-CM

## 2018-12-04 PROCEDURE — 99999 PR OFFICE/OUTPT VISIT,PROCEDURE ONLY: CPT

## 2018-12-18 ENCOUNTER — HOSPITAL ENCOUNTER (OUTPATIENT)
Dept: NON INVASIVE DIAGNOSTICS | Age: 33
Discharge: HOME | End: 2018-12-18
Payer: COMMERCIAL

## 2018-12-18 DIAGNOSIS — Z01.818 PRE-OP EVALUATION: ICD-10-CM

## 2018-12-18 LAB
LV EF: 48 %
LVEF MODALITY: NORMAL

## 2018-12-18 PROCEDURE — 93306 TTE W/DOPPLER COMPLETE: CPT

## 2018-12-27 ENCOUNTER — HOSPITAL ENCOUNTER (EMERGENCY)
Age: 33
Discharge: HOME | End: 2018-12-27
Payer: COMMERCIAL

## 2018-12-27 ENCOUNTER — APPOINTMENT (OUTPATIENT)
Dept: GENERAL RADIOLOGY | Age: 33
End: 2018-12-27
Payer: COMMERCIAL

## 2018-12-27 ENCOUNTER — APPOINTMENT (OUTPATIENT)
Dept: CT IMAGING | Age: 33
End: 2018-12-27
Payer: COMMERCIAL

## 2018-12-27 VITALS
TEMPERATURE: 97.9 F | DIASTOLIC BLOOD PRESSURE: 77 MMHG | HEIGHT: 72 IN | OXYGEN SATURATION: 98 % | SYSTOLIC BLOOD PRESSURE: 140 MMHG | BODY MASS INDEX: 39.68 KG/M2 | WEIGHT: 293 LBS | RESPIRATION RATE: 18 BRPM | HEART RATE: 82 BPM

## 2018-12-27 DIAGNOSIS — S16.1XXA ACUTE STRAIN OF NECK MUSCLE, INITIAL ENCOUNTER: ICD-10-CM
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
## 2018-12-27 DIAGNOSIS — S70.12XA CONTUSION OF LEFT THIGH, INITIAL ENCOUNTER: ICD-10-CM

## 2018-12-27 DIAGNOSIS — R51.9 NONINTRACTABLE HEADACHE, UNSPECIFIED CHRONICITY PATTERN, UNSPECIFIED HEADACHE TYPE: ICD-10-CM

## 2018-12-27 DIAGNOSIS — Z23 NEED FOR TETANUS BOOSTER: ICD-10-CM

## 2018-12-27 PROCEDURE — 90471 IMMUNIZATION ADMIN: CPT | Performed by: PHYSICIAN ASSISTANT

## 2018-12-27 PROCEDURE — 6370000000 HC RX 637 (ALT 250 FOR IP): Performed by: PHYSICIAN ASSISTANT

## 2018-12-27 PROCEDURE — 72125 CT NECK SPINE W/O DYE: CPT

## 2018-12-27 PROCEDURE — 70450 CT HEAD/BRAIN W/O DYE: CPT

## 2018-12-27 PROCEDURE — 90715 TDAP VACCINE 7 YRS/> IM: CPT | Performed by: PHYSICIAN ASSISTANT

## 2018-12-27 PROCEDURE — 6360000002 HC RX W HCPCS: Performed by: PHYSICIAN ASSISTANT

## 2018-12-27 PROCEDURE — 73552 X-RAY EXAM OF FEMUR 2/>: CPT

## 2018-12-27 PROCEDURE — 99284 EMERGENCY DEPT VISIT MOD MDM: CPT

## 2018-12-27 RX ORDER — METHOCARBAMOL 500 MG/1
500 TABLET, FILM COATED ORAL 4 TIMES DAILY
Qty: 20 TABLET | Refills: 0 | Status: SHIPPED | OUTPATIENT
Start: 2018-12-27 | End: 2019-12-03 | Stop reason: ALTCHOICE

## 2018-12-27 RX ORDER — DIAZEPAM 5 MG/1
5 TABLET ORAL ONCE
Status: COMPLETED | OUTPATIENT
Start: 2018-12-27 | End: 2018-12-27

## 2018-12-27 RX ORDER — ACETAMINOPHEN 500 MG
500 TABLET ORAL ONCE
Status: COMPLETED | OUTPATIENT
Start: 2018-12-27 | End: 2018-12-27

## 2018-12-27 RX ORDER — NAPROXEN 500 MG/1
500 TABLET ORAL 2 TIMES DAILY PRN
Qty: 30 TABLET | Refills: 0 | Status: SHIPPED | OUTPATIENT
Start: 2018-12-27 | End: 2018-12-27

## 2018-12-27 RX ORDER — METHOCARBAMOL 500 MG/1
500 TABLET, FILM COATED ORAL 4 TIMES DAILY
Qty: 20 TABLET | Refills: 0 | Status: SHIPPED | OUTPATIENT
Start: 2018-12-27 | End: 2018-12-27

## 2018-12-27 RX ORDER — NAPROXEN 500 MG/1
500 TABLET ORAL 2 TIMES DAILY PRN
Qty: 30 TABLET | Refills: 0 | Status: SHIPPED | OUTPATIENT
Start: 2018-12-27 | End: 2019-10-23

## 2018-12-27 RX ADMIN — TETANUS TOXOID, REDUCED DIPHTHERIA TOXOID AND ACELLULAR PERTUSSIS VACCINE, ADSORBED 0.5 ML: 5; 2.5; 8; 8; 2.5 SUSPENSION INTRAMUSCULAR at 18:21

## 2018-12-27 RX ADMIN — ACETAMINOPHEN 500 MG: 500 TABLET ORAL at 18:21

## 2018-12-27 RX ADMIN — DIAZEPAM 5 MG: 5 TABLET ORAL at 18:21

## 2018-12-27 ASSESSMENT — PAIN DESCRIPTION - LOCATION: LOCATION: HEAD

## 2018-12-27 ASSESSMENT — PAIN DESCRIPTION - DESCRIPTORS: DESCRIPTORS: HEADACHE

## 2018-12-27 ASSESSMENT — PAIN SCALES - GENERAL
PAINLEVEL_OUTOF10: 8
PAINLEVEL_OUTOF10: 8

## 2018-12-27 NOTE — ED PROVIDER NOTES
Ultram [Tramadol Hcl] Hives       SOCIAL & FAMILY HISTORY    Social History     Social History    Marital status: Single     Spouse name: N/A    Number of children: N/A    Years of education: N/A     Social History Main Topics    Smoking status: Current Every Day Smoker     Packs/day: 0.50     Years: 9.00     Types: Cigarettes    Smokeless tobacco: Never Used    Alcohol use No    Drug use: No    Sexual activity: Yes     Partners: Male     Other Topics Concern    None     Social History Narrative    None     Family History   Problem Relation Age of Onset    Diabetes Mother     High Blood Pressure Mother     Heart Disease Mother     Heart Disease Maternal Grandmother     Heart Disease Maternal Grandfather          PHYSICAL EXAM    VITAL SIGNS: BP (!) 140/77   Pulse 82   Temp 97.9 °F (36.6 °C) (Oral)   Resp 18   Ht 6' (1.829 m)   Wt (!) 380 lb (172.4 kg)   SpO2 98%   BMI 51.54 kg/m²    Constitutional:  Well developed, well nourished, no acute distress   HENT:  Atraumatic. EOMI. PERRL. Moist mucus membranes. No clear fluid or blood from ears, eyes, nose, or mouth. Neck / Back:   Supple, no JVD,   No discoloration or swelling on inspection. + posterior neck tenderness without palpable swelling or defect. Patient has full range of motion, although slow due to pain/stiffness. No upper, middle, lower back discoloration swelling, or tenderness    Cardiovascular / Chest:  Reg rate & rhythm, no murmurs/rubs/gallops. No chest wall swelling, discoloration, or tenderness. No flail segments or paradoxical chestwall movements. Respiratory:  Lungs Clear, no retractions. GI: Obese. No discoloration. Soft, nontender, normal bowel sounds  Musculoskeletal:  No edema, no acute deformities  Contusion noted to left upper thigh with central abrasion. Discomfort palpation along anterior aspect of thigh. Patient has full range of motion of left hip and knee.  Sensation intact throughout left lower

## 2019-01-15 ENCOUNTER — OFFICE VISIT (OUTPATIENT)
Dept: BARIATRICS/WEIGHT MGMT | Age: 34
End: 2019-01-15
Payer: COMMERCIAL

## 2019-01-15 VITALS
BODY MASS INDEX: 41.02 KG/M2 | DIASTOLIC BLOOD PRESSURE: 76 MMHG | SYSTOLIC BLOOD PRESSURE: 130 MMHG | WEIGHT: 293 LBS | HEART RATE: 90 BPM | HEIGHT: 71 IN

## 2019-01-15 DIAGNOSIS — Z01.818 PRE-OP EVALUATION: ICD-10-CM

## 2019-01-15 DIAGNOSIS — E66.01 MORBID OBESITY WITH BMI OF 50.0-59.9, ADULT (HCC): Primary | ICD-10-CM

## 2019-01-15 PROCEDURE — G8417 CALC BMI ABV UP PARAM F/U: HCPCS | Performed by: NURSE PRACTITIONER

## 2019-01-15 PROCEDURE — 99213 OFFICE O/P EST LOW 20 MIN: CPT | Performed by: NURSE PRACTITIONER

## 2019-01-15 PROCEDURE — G8484 FLU IMMUNIZE NO ADMIN: HCPCS | Performed by: NURSE PRACTITIONER

## 2019-01-15 PROCEDURE — 4004F PT TOBACCO SCREEN RCVD TLK: CPT | Performed by: NURSE PRACTITIONER

## 2019-01-15 PROCEDURE — G8427 DOCREV CUR MEDS BY ELIG CLIN: HCPCS | Performed by: NURSE PRACTITIONER

## 2019-01-15 ASSESSMENT — ENCOUNTER SYMPTOMS
EYE PAIN: 0
SHORTNESS OF BREATH: 0
RHINORRHEA: 0
NAUSEA: 0
BACK PAIN: 1
WHEEZING: 0
DIARRHEA: 0
TROUBLE SWALLOWING: 0
ABDOMINAL DISTENTION: 0
ABDOMINAL PAIN: 0
CHEST TIGHTNESS: 0

## 2019-02-22 ENCOUNTER — HOSPITAL ENCOUNTER (OUTPATIENT)
Dept: SLEEP CENTER | Age: 34
Discharge: HOME | End: 2019-02-22
Payer: COMMERCIAL

## 2019-02-22 VITALS — WEIGHT: 293 LBS | HEIGHT: 72 IN | BODY MASS INDEX: 39.68 KG/M2

## 2019-02-22 DIAGNOSIS — G47.10 HYPERSOMNOLENCE: ICD-10-CM

## 2019-02-22 PROCEDURE — 95810 POLYSOM 6/> YRS 4/> PARAM: CPT

## 2019-02-22 ASSESSMENT — SLEEP AND FATIGUE QUESTIONNAIRES
HOW LIKELY ARE YOU TO NOD OFF OR FALL ASLEEP WHILE LYING DOWN TO REST IN THE AFTERNOON WHEN CIRCUMSTANCES PERMIT: 2
HOW LIKELY ARE YOU TO NOD OFF OR FALL ASLEEP WHILE SITTING INACTIVE IN A PUBLIC PLACE: 2
HOW LIKELY ARE YOU TO NOD OFF OR FALL ASLEEP WHILE WATCHING TV: 2
HOW LIKELY ARE YOU TO NOD OFF OR FALL ASLEEP WHILE SITTING QUIETLY AFTER LUNCH WITHOUT ALCOHOL: 0
ESS TOTAL SCORE: 10
HOW LIKELY ARE YOU TO NOD OFF OR FALL ASLEEP WHILE SITTING AND READING: 1
HOW LIKELY ARE YOU TO NOD OFF OR FALL ASLEEP IN A CAR, WHILE STOPPED FOR A FEW MINUTES IN TRAFFIC: 0
HOW LIKELY ARE YOU TO NOD OFF OR FALL ASLEEP WHILE SITTING AND TALKING TO SOMEONE: 1
HOW LIKELY ARE YOU TO NOD OFF OR FALL ASLEEP WHEN YOU ARE A PASSENGER IN A CAR FOR AN HOUR WITHOUT A BREAK: 2

## 2019-05-17 ENCOUNTER — HOSPITAL ENCOUNTER (OUTPATIENT)
Dept: SLEEP CENTER | Age: 34
Discharge: HOME | End: 2019-05-17
Payer: COMMERCIAL

## 2019-05-17 VITALS — HEIGHT: 72 IN | BODY MASS INDEX: 39.68 KG/M2 | WEIGHT: 293 LBS

## 2019-05-17 DIAGNOSIS — G47.33 OBSTRUCTIVE SLEEP APNEA: ICD-10-CM

## 2019-05-17 PROCEDURE — 95811 POLYSOM 6/>YRS CPAP 4/> PARM: CPT

## 2019-05-17 ASSESSMENT — SLEEP AND FATIGUE QUESTIONNAIRES
HOW LIKELY ARE YOU TO NOD OFF OR FALL ASLEEP WHILE WATCHING TV: 2
HOW LIKELY ARE YOU TO NOD OFF OR FALL ASLEEP WHILE SITTING AND READING: 1
HOW LIKELY ARE YOU TO NOD OFF OR FALL ASLEEP WHILE SITTING AND TALKING TO SOMEONE: 1
HOW LIKELY ARE YOU TO NOD OFF OR FALL ASLEEP WHILE SITTING INACTIVE IN A PUBLIC PLACE: 2
HOW LIKELY ARE YOU TO NOD OFF OR FALL ASLEEP WHILE LYING DOWN TO REST IN THE AFTERNOON WHEN CIRCUMSTANCES PERMIT: 2
NECK CIRCUMFERENCE (INCHES): 17.5
ESS TOTAL SCORE: 10
HOW LIKELY ARE YOU TO NOD OFF OR FALL ASLEEP WHEN YOU ARE A PASSENGER IN A CAR FOR AN HOUR WITHOUT A BREAK: 2
HOW LIKELY ARE YOU TO NOD OFF OR FALL ASLEEP IN A CAR, WHILE STOPPED FOR A FEW MINUTES IN TRAFFIC: 0
HOW LIKELY ARE YOU TO NOD OFF OR FALL ASLEEP WHILE SITTING QUIETLY AFTER LUNCH WITHOUT ALCOHOL: 0

## 2019-05-18 NOTE — PROGRESS NOTES
5/18/2019  sleep study  for Doug Barrett  1985 is complete. Results are pending physician review.     Electronically signed by Erin Schuler on 5/18/2019 at 7:01 AM

## 2019-05-25 NOTE — PROGRESS NOTES
Results for the most recent sleep study on Nasrin Nearing  1985 are finalized and available. Please see media tab.     Electronically signed by Naman Colorado on 5/24/2019 at 8:55 PM

## 2019-06-06 ENCOUNTER — TELEPHONE (OUTPATIENT)
Dept: CARDIOLOGY CLINIC | Age: 34
End: 2019-06-06

## 2019-06-17 ENCOUNTER — OFFICE VISIT (OUTPATIENT)
Dept: CARDIOLOGY CLINIC | Age: 34
End: 2019-06-17
Payer: COMMERCIAL

## 2019-06-17 ENCOUNTER — TELEPHONE (OUTPATIENT)
Dept: CARDIOLOGY CLINIC | Age: 34
End: 2019-06-17

## 2019-06-17 VITALS
BODY MASS INDEX: 39.68 KG/M2 | WEIGHT: 293 LBS | SYSTOLIC BLOOD PRESSURE: 110 MMHG | DIASTOLIC BLOOD PRESSURE: 70 MMHG | HEART RATE: 70 BPM | HEIGHT: 72 IN

## 2019-06-17 DIAGNOSIS — Z01.818 PRE-OP EVALUATION: ICD-10-CM

## 2019-06-17 PROCEDURE — G8427 DOCREV CUR MEDS BY ELIG CLIN: HCPCS | Performed by: INTERNAL MEDICINE

## 2019-06-17 PROCEDURE — G8417 CALC BMI ABV UP PARAM F/U: HCPCS | Performed by: INTERNAL MEDICINE

## 2019-06-17 PROCEDURE — 4004F PT TOBACCO SCREEN RCVD TLK: CPT | Performed by: INTERNAL MEDICINE

## 2019-06-17 PROCEDURE — 93000 ELECTROCARDIOGRAM COMPLETE: CPT | Performed by: INTERNAL MEDICINE

## 2019-06-17 PROCEDURE — 99213 OFFICE O/P EST LOW 20 MIN: CPT | Performed by: INTERNAL MEDICINE

## 2019-06-17 RX ORDER — SERTRALINE HYDROCHLORIDE 25 MG/1
25 TABLET, FILM COATED ORAL DAILY
COMMUNITY
End: 2019-10-23

## 2019-06-17 NOTE — TELEPHONE ENCOUNTER
Patient is cleared for surgery/procedure at a low risk per Dr. Oj Robbins.   Letter completed and faxed

## 2019-06-17 NOTE — LETTER
Izaiah Hernandez  [address] [facility]  [facility], [address] [facility],[address]  Phone: [phone]    Fax [phone]                  Jesse Vega MD, Marylou Dickson MD, Dany David MD, MD Viki Phipps, MD Adele Molina, MD Dhara Maldonado, APRN-CNP   Jose Kimball, APRN-CNP  Freddy Morrison, APRN-CNP    Cardiac Risk Assessment                                                                                           6/17/2019       Surgery Date: Pending  Surgery: R knee meniscectomy  Anesthesia Type: General  Fax Number: [phone]    To: Dr. Brooke Cardenas  From : Dr. Nisha Gonzalez    Patient Name: Tiffanie Monreal     YOB: 1985    The patient was evaluated from a cardiac standpoint for her surgery or procedure and based on her history and test results, she is considered a low risk candidate for any chu-operative cardiac complications. Please continue patient's current medical regimen; do not discontinue beta blockers. Antiplatelet therapy can be held prior to the surgery or procedure at the discretion of the surgeon to be resumed as soon as possible if held. Please call with any further questions.     Respectfully,          Lizbeth Kendall MD, MA/allisons

## 2019-06-17 NOTE — PROGRESS NOTES
Sandra Roland MD        OFFICE  FOLLOWUP NOTE    Chief complaints: patient is here for management of pre op for rt knee, smoking, headache, ADHA, depression      Subjective: patient feels better, no chest pain, no shortness of breath, no dizziness, no palpitations    HPI Del Grier is a 29 y. o.year old who  has a past medical history of Anxiety, Anxiety disorder, Asthma, Bipolar disorder ([address] Depression, Drug addiction ([address] Headache(784.0), History of exercise stress test, and Miscarriage. and presents for management of  pre op for rt knee, smoking, headache, ADHA, depression which are well controlled      Current Outpatient Medications   Medication Sig Dispense Refill    sertraline (ZOLOFT) 25 MG tablet Take 25 mg by mouth daily      BUPROPION HCL PO Take by mouth      albuterol sulfate  (90 Base) MCG/ACT inhaler Inhale 2 puffs into the lungs 4 times daily 1 Inhaler 5    methocarbamol (ROBAXIN) 500 MG tablet Take 1 tablet by mouth 4 times daily As needed for muscle spasm. 20 tablet 0    naproxen (NAPROSYN) 500 MG tablet Take 1 tablet by mouth 2 times daily as needed for Pain 30 tablet 0    nicotine polacrilex (NICORETTE) 2 MG gum Take 1 each by mouth as needed for Smoking cessation Do not exceed 20 per day. 170 each 1    Elastic Bandages & Supports (KNEE BRACE/CUSHION/L-XL) MISC Please dispense one knee brace to be worn for 8 hrs during the day and taken off at nighttime for right knee for comfort.  1 each 0    Nutritional Supplements (ENSURE HIGH PROTEIN) LIQD Take 1 Can by mouth daily Replace 1 meal per day 32 Can 0    ondansetron (ZOFRAN) 4 MG tablet Take 1 tablet by mouth every 8 hours as needed for Nausea 10 tablet 0    ibuprofen (ADVIL;MOTRIN) 600 MG tablet Take 1 tablet by mouth every 6 hours as needed for Pain or Fever 60 tablet 0    butalbital-acetaminophen-caffeine (FIORICET) -40 MG per tablet Take 1 tablet by mouth every 4 hours as needed for Headaches 10 tablet 0    lidocaine (LIDODERM) 5 % Place 1 patch onto the skin daily 12 hours on, 12 hours off.  gabapentin (NEURONTIN) 300 MG capsule Take 300 mg by mouth 3 times daily      oxyCODONE-acetaminophen (PERCOCET) 5-325 MG per tablet Take 1 tablet by mouth every 4 hours as needed for Pain       No current facility-administered medications for this visit.       Allergies: Latex and Ultram [tramadol hcl]  Past Medical History:   Diagnosis Date    Anxiety     Anxiety disorder     Asthma     [facility]     Depression     Drug addiction ([facility]     states previous addiction to vicodin, denies current use    Headache(784.0)     History of exercise stress test 2017    treadmill    Miscarriage     x4     Past Surgical History:   Procedure Laterality Date     SECTION      DILATION AND CURETTAGE OF UTERUS      TONSILLECTOMY  2002    TOOTH EXTRACTION      TUBAL LIGATION       Family History   Problem Relation Age of Onset    Diabetes Mother     High Blood Pressure Mother     Heart Disease Mother     Heart Disease Maternal Grandmother     Heart Disease Maternal Grandfather      Social History     Tobacco Use    Smoking status: Current Every Day Smoker     Packs/day: 0.50     Years: 17.00     Pack years: 8.50     Types: Cigarettes    Smokeless tobacco: Never Used   Substance Use Topics    Alcohol use: No      [unfilled]  Review of Systems:   · Constitutional: No Fever or Weight Loss   · Eyes: No Decreased Vision  · ENT: No Headaches, Hearing Loss or Vertigo  · Cardiovascular: No chest pain, dyspnea on exertion, palpitations or loss of consciousness  · Respiratory: No cough or wheezing    · Gastrointestinal: No abdominal pain, appetite loss, blood in stools, constipation, diarrhea or heartburn  · Genitourinary: No dysuria, trouble voiding, or hematuria  · Musculoskeletal:  No gait disturbance, weakness or joint complaints  · Integumentary: No rash or pruritis  · Neurological: No TIA or stroke symptoms  · Psychiatric: No anxiety or depression  · Endocrine: No malaise, fatigue or temperature intolerance  · Hematologic/Lymphatic: No bleeding problems, blood clots or swollen lymph nodes  · Allergic/Immunologic: No nasal congestion or hives  All systems negative except as marked. Objective:  /70   Pulse 70   Ht 6' (1.829 m)   Wt (!) 399 lb (181 kg)   BMI 54.11 kg/m²   Wt Readings from Last 3 Encounters:   06/17/19 (!) 399 lb (181 kg)   05/30/19 (!) 407 lb (184.6 kg)   05/17/19 (!) 385 lb (174.6 kg)     Body mass index is 54.11 kg/m². GENERAL - Alert, oriented, pleasant, in no apparent distress,normal grooming  HEENT - pupils are reactive to light and accomodation, cornea intact, conjunctive normal color, ears are normal in exam,throat exam in normal, teeth, gum and palate are normal, oral mucosa is normal without any notation of pallor or cyanosis  Neck - Supple. No jugular venous distention noted. No carotid bruits, no apical -carotid delay  Respiratory:  Normal breath sounds, No respiratory distress, No wheezing, No chest tenderness. ,no use of accessory muscles, diaphragm movement is normal  Cardiovascular: (PMI) apex non displaced,no lifts no thrills, no s3,no s4, Normal heart rate, Normal rhythm, No murmurs, No rubs, No gallops.  Carotid arteries pulse and amplitude are normal no bruit, no abdominal bruit noted ( normal abdominal aorta ausculation), femoral arteries pulse and amplitude are normal no bruit, pedal pulses are normal  Femoral pulses have normal amplitude, no bruits   Extremities - No cyanosis, clubbing, or significant edema, no varicose veins    Abdomen - No masses, tenderness, or organomegaly, no hepato-splenomegally, no bruits  Musculoskeletal - No significant edema, no kyphosis or scoliosis, no deformity in any extremity noted, muscle strength and tone are normal  Skin: no ulcer,no scar,no stasis dermatitis   Neurologic - alert oriented times 3,Cranial nerves II through XII are grossly intact. There were no gross focal neurologic abnormalities. All sensory and motor nerves examined and were normal  Psychiatric: normal mood and affect    Lab Results   Component Value Date    CKTOTAL 112 04/28/2017    TROPONINI <0.006 11/14/2013     BNP:  No results found for: BNP  PT/INR:  No results found for: PTINR  Lab Results   Component Value Date    LABA1C 5.2 07/30/2013     Lab Results   Component Value Date    CHOL 180 07/30/2013    TRIG 101 07/30/2013    HDL 44 (L) 07/30/2013    LDLDIRECT 133 (H) 07/30/2013     Lab Results   Component Value Date    ALT 20 04/28/2017    AST 24 04/28/2017     TSH:  No results found for: TSH    Impression:  Elda Lepe is a 29 y. o.year old who  has a past medical history of Anxiety, Anxiety disorder, Asthma, Bipolar disorder ([address] Depression, Drug addiction ([address] Headache(784.0), History of exercise stress test, and Miscarriage. and presents with     Plan:  1. preop for knee sx: cleared on from stand point  2. Headache\"  patient is on percocet, neurontin. Patient is off fioricet  3. ADHD:off adderal  4. Depression and axiety: recommend psychiatrist evaluation. All labs, medications and tests reviewed, continue all other medications of all above medical condition listed as is.     [unfilled]

## 2019-09-08 ENCOUNTER — HOSPITAL ENCOUNTER (EMERGENCY)
Age: 34
Discharge: HOME | End: 2019-09-08
Payer: COMMERCIAL

## 2019-09-08 VITALS
WEIGHT: 293 LBS | HEART RATE: 98 BPM | TEMPERATURE: 97.8 F | BODY MASS INDEX: 40.55 KG/M2 | SYSTOLIC BLOOD PRESSURE: 115 MMHG | DIASTOLIC BLOOD PRESSURE: 62 MMHG | OXYGEN SATURATION: 96 % | RESPIRATION RATE: 19 BRPM

## 2019-09-08 DIAGNOSIS — J06.9 URI WITH COUGH AND CONGESTION: Primary | ICD-10-CM

## 2019-09-08 DIAGNOSIS — R11.10 POST-TUSSIVE EMESIS: ICD-10-CM

## 2019-09-08 PROCEDURE — 99282 EMERGENCY DEPT VISIT SF MDM: CPT

## 2019-09-08 RX ORDER — DEXTROMETHORPHAN HYDROBROMIDE AND PROMETHAZINE HYDROCHLORIDE 15; 6.25 MG/5ML; MG/5ML
5 SYRUP ORAL 4 TIMES DAILY PRN
Qty: 60 ML | Refills: 0 | Status: SHIPPED | OUTPATIENT
Start: 2019-09-08 | End: 2019-09-15

## 2019-09-08 RX ORDER — FLUTICASONE PROPIONATE 50 MCG
1 SPRAY, SUSPENSION (ML) NASAL 2 TIMES DAILY
Qty: 1 BOTTLE | Refills: 0 | Status: SHIPPED | OUTPATIENT
Start: 2019-09-08 | End: 2019-12-03 | Stop reason: ALTCHOICE

## 2019-09-08 RX ORDER — BENZONATATE 100 MG/1
100 CAPSULE ORAL 3 TIMES DAILY PRN
Qty: 21 CAPSULE | Refills: 0 | Status: SHIPPED | OUTPATIENT
Start: 2019-09-08 | End: 2019-09-15

## 2019-09-08 RX ORDER — ACETAMINOPHEN 500 MG
500 TABLET ORAL EVERY 6 HOURS PRN
Qty: 20 TABLET | Refills: 0 | Status: SHIPPED | OUTPATIENT
Start: 2019-09-08 | End: 2020-10-14 | Stop reason: CLARIF

## 2019-09-08 ASSESSMENT — PAIN SCALES - GENERAL: PAINLEVEL_OUTOF10: 7

## 2019-09-08 ASSESSMENT — PAIN DESCRIPTION - LOCATION: LOCATION: HEAD

## 2019-09-08 ASSESSMENT — PAIN DESCRIPTION - PAIN TYPE: TYPE: ACUTE PAIN

## 2019-09-08 NOTE — ED PROVIDER NOTES
acetaminophen (APAP EXTRA STRENGTH) 500 MG tablet Take 1 tablet by mouth every 6 hours as needed for Pain, Disp-20 tablet, R-0Print                 Comment: Please note this report has been produced using speech recognition software and may contain errors related to that system including errors in grammar, punctuation, and spelling, as well as words and phrases that may be inappropriate. If there are any questions or concerns please feel free to contact the dictating provider for clarification.         Octavio Sylvester PA-C  09/08/19 [id number]

## 2019-10-23 ENCOUNTER — OFFICE VISIT (OUTPATIENT)
Dept: BARIATRICS/WEIGHT MGMT | Age: 34
End: 2019-10-23
Payer: COMMERCIAL

## 2019-10-23 VITALS
BODY MASS INDEX: 39.68 KG/M2 | HEART RATE: 73 BPM | HEIGHT: 72 IN | OXYGEN SATURATION: 94 % | DIASTOLIC BLOOD PRESSURE: 74 MMHG | SYSTOLIC BLOOD PRESSURE: 130 MMHG | WEIGHT: 293 LBS

## 2019-10-23 DIAGNOSIS — K43.9 VENTRAL HERNIA WITHOUT OBSTRUCTION OR GANGRENE: Primary | ICD-10-CM

## 2019-10-23 PROCEDURE — G8484 FLU IMMUNIZE NO ADMIN: HCPCS | Performed by: SURGERY

## 2019-10-23 PROCEDURE — 4004F PT TOBACCO SCREEN RCVD TLK: CPT | Performed by: SURGERY

## 2019-10-23 PROCEDURE — G8417 CALC BMI ABV UP PARAM F/U: HCPCS | Performed by: SURGERY

## 2019-10-23 PROCEDURE — 99214 OFFICE O/P EST MOD 30 MIN: CPT | Performed by: SURGERY

## 2019-10-23 PROCEDURE — G8427 DOCREV CUR MEDS BY ELIG CLIN: HCPCS | Performed by: SURGERY

## 2019-10-23 RX ORDER — AMOXICILLIN 250 MG
2 CAPSULE ORAL DAILY
Qty: 60 TABLET | Refills: 3 | Status: SHIPPED | OUTPATIENT
Start: 2019-10-23 | End: 2019-11-02

## 2019-10-23 RX ORDER — DIAZEPAM 5 MG/1
5 TABLET ORAL EVERY 8 HOURS PRN
Qty: 20 TABLET | Refills: 1 | Status: SHIPPED | OUTPATIENT
Start: 2019-10-23 | End: 2019-11-02

## 2019-10-23 ASSESSMENT — ENCOUNTER SYMPTOMS
VOICE CHANGE: 0
DIARRHEA: 0
CONSTIPATION: 0
PHOTOPHOBIA: 0
SHORTNESS OF BREATH: 1
SORE THROAT: 0
BACK PAIN: 1
WHEEZING: 0
COUGH: 0
ABDOMINAL DISTENTION: 1
VOMITING: 0
COLOR CHANGE: 0
TROUBLE SWALLOWING: 0
ANAL BLEEDING: 0
BLOOD IN STOOL: 0
ABDOMINAL PAIN: 1
NAUSEA: 0

## 2019-10-30 ENCOUNTER — TELEPHONE (OUTPATIENT)
Dept: BARIATRICS/WEIGHT MGMT | Age: 34
End: 2019-10-30

## 2019-11-05 ENCOUNTER — TELEPHONE (OUTPATIENT)
Dept: BARIATRICS/WEIGHT MGMT | Age: 34
End: 2019-11-05

## 2019-11-25 ENCOUNTER — ANESTHESIA EVENT (OUTPATIENT)
Dept: OPERATING ROOM | Age: 34
End: 2019-11-25
Payer: COMMERCIAL

## 2019-12-03 ENCOUNTER — HOSPITAL ENCOUNTER (OUTPATIENT)
Dept: PREADMISSION TESTING | Age: 34
Discharge: HOME | End: 2019-12-07
Payer: COMMERCIAL

## 2019-12-03 VITALS
HEART RATE: 76 BPM | SYSTOLIC BLOOD PRESSURE: 139 MMHG | HEIGHT: 72 IN | RESPIRATION RATE: 20 BRPM | BODY MASS INDEX: 39.68 KG/M2 | WEIGHT: 293 LBS | TEMPERATURE: 97.6 F | DIASTOLIC BLOOD PRESSURE: 81 MMHG

## 2019-12-03 LAB
AMPHETAMINES: NEGATIVE
ANION GAP SERPL CALCULATED.3IONS-SCNC: 10 MMOL/L (ref 4–16)
BARBITURATE SCREEN URINE: NEGATIVE
BENZODIAZEPINE SCREEN, URINE: NEGATIVE
BUN BLDV-MCNC: 10 MG/DL (ref 6–23)
CALCIUM SERPL-MCNC: 9.2 MG/DL (ref 8.3–10.6)
CANNABINOID SCREEN URINE: NEGATIVE
CHLORIDE BLD-SCNC: 100 MMOL/L (ref 99–110)
CO2: 25 MMOL/L (ref 21–32)
COCAINE METABOLITE: ABNORMAL
CREAT SERPL-MCNC: 0.8 MG/DL (ref 0.6–1.1)
GFR AFRICAN AMERICAN: >60 ML/MIN/1.73M2
GFR NON-AFRICAN AMERICAN: >60 ML/MIN/1.73M2
GLUCOSE BLD-MCNC: 114 MG/DL (ref 70–99)
HCT VFR BLD CALC: 44 % (ref 37–47)
HEMOGLOBIN: 14 GM/DL (ref 12.5–16)
MCH RBC QN AUTO: 29 PG (ref 27–31)
MCHC RBC AUTO-ENTMCNC: 31.8 % (ref 32–36)
MCV RBC AUTO: 91.1 FL (ref 78–100)
OPIATES, URINE: NEGATIVE
OXYCODONE: ABNORMAL
PDW BLD-RTO: 12.5 % (ref 11.7–14.9)
PHENCYCLIDINE, URINE: NEGATIVE
PLATELET # BLD: 285 K/CU MM (ref 140–440)
PMV BLD AUTO: 9.8 FL (ref 7.5–11.1)
POTASSIUM SERPL-SCNC: 4.4 MMOL/L (ref 3.5–5.1)
RBC # BLD: 4.83 M/CU MM (ref 4.2–5.4)
SODIUM BLD-SCNC: 135 MMOL/L (ref 135–145)
WBC # BLD: 6.7 K/CU MM (ref 4–10.5)

## 2019-12-03 PROCEDURE — 80307 DRUG TEST PRSMV CHEM ANLYZR: CPT

## 2019-12-03 PROCEDURE — 36415 COLL VENOUS BLD VENIPUNCTURE: CPT

## 2019-12-03 PROCEDURE — 80048 BASIC METABOLIC PNL TOTAL CA: CPT

## 2019-12-03 PROCEDURE — 85027 COMPLETE CBC AUTOMATED: CPT

## 2019-12-03 RX ORDER — HEPARIN SODIUM 5000 [USP'U]/ML
5000 INJECTION, SOLUTION INTRAVENOUS; SUBCUTANEOUS ONCE
Status: CANCELLED | OUTPATIENT
Start: 2019-12-10

## 2019-12-03 RX ORDER — SODIUM CHLORIDE, SODIUM LACTATE, POTASSIUM CHLORIDE, CALCIUM CHLORIDE 600; 310; 30; 20 MG/100ML; MG/100ML; MG/100ML; MG/100ML
INJECTION, SOLUTION INTRAVENOUS CONTINUOUS
Status: CANCELLED | OUTPATIENT
Start: 2019-12-10

## 2019-12-03 ASSESSMENT — LIFESTYLE VARIABLES: SMOKING_STATUS: 1

## 2019-12-03 ASSESSMENT — ENCOUNTER SYMPTOMS
DYSPNEA ACTIVITY LEVEL: AFTER AMBULATING 1 FLIGHT OF STAIRS
SHORTNESS OF BREATH: 1

## 2019-12-09 PROCEDURE — 99234 HOSP IP/OBS SM DT SF/LOW 45: CPT | Performed by: SURGERY

## 2019-12-10 ENCOUNTER — ANESTHESIA (OUTPATIENT)
Dept: OPERATING ROOM | Age: 34
End: 2019-12-10
Payer: COMMERCIAL

## 2019-12-10 ENCOUNTER — HOSPITAL ENCOUNTER (OUTPATIENT)
Age: 34
Setting detail: OUTPATIENT SURGERY
Discharge: HOME | End: 2019-12-10
Attending: SURGERY | Admitting: SURGERY
Payer: COMMERCIAL

## 2019-12-10 VITALS
DIASTOLIC BLOOD PRESSURE: 70 MMHG | HEART RATE: 76 BPM | RESPIRATION RATE: 16 BRPM | OXYGEN SATURATION: 96 % | SYSTOLIC BLOOD PRESSURE: 135 MMHG | TEMPERATURE: 97.4 F

## 2019-12-10 LAB
AMPHETAMINES: NEGATIVE
BARBITURATE SCREEN URINE: ABNORMAL
BENZODIAZEPINE SCREEN, URINE: NEGATIVE
CANNABINOID SCREEN URINE: NEGATIVE
COCAINE METABOLITE: ABNORMAL
OPIATES, URINE: NEGATIVE
OXYCODONE: ABNORMAL
PHENCYCLIDINE, URINE: NEGATIVE
PREGNANCY TEST URINE, POC: NEGATIVE
PREGNANCY TEST URINE, POC: NORMAL

## 2019-12-10 PROCEDURE — 80307 DRUG TEST PRSMV CHEM ANLYZR: CPT

## 2019-12-10 PROCEDURE — 81025 URINE PREGNANCY TEST: CPT

## 2019-12-10 RX ORDER — HEPARIN SODIUM 5000 [USP'U]/ML
5000 INJECTION, SOLUTION INTRAVENOUS; SUBCUTANEOUS ONCE
Status: DISCONTINUED | OUTPATIENT
Start: 2019-12-10 | End: 2019-12-10 | Stop reason: HOSPADM

## 2019-12-10 RX ORDER — SODIUM CHLORIDE, SODIUM LACTATE, POTASSIUM CHLORIDE, CALCIUM CHLORIDE 600; 310; 30; 20 MG/100ML; MG/100ML; MG/100ML; MG/100ML
INJECTION, SOLUTION INTRAVENOUS CONTINUOUS
Status: DISCONTINUED | OUTPATIENT
Start: 2019-12-10 | End: 2019-12-10 | Stop reason: HOSPADM

## 2019-12-17 ENCOUNTER — TELEPHONE (OUTPATIENT)
Dept: BARIATRICS/WEIGHT MGMT | Age: 34
End: 2019-12-17

## 2020-01-07 NOTE — PROGRESS NOTES
Surgery:   [email or web address]                       Arrival time: [id number]  Nothing to eat or drink after midnight unless instructed to take certain medications by the doctor or the nurse the am of surgery  Arrive at the front information desk -1st floor /[facility] is on [address] [place] [address]  Please leave money and all other valuables at home. Wear comfortable clothing. If you wear contacts please bring a case. No make up. You may be asked to remove rings or body piercing. Please bring insurance cards and picture ID am of procedure. Please bring any consent or paper work from your doctor. If you become ill,such as a cold, sore throat or fever contact your doctor. Please bathe or shower am of procedure.   Medications to take AM of procedure:     Any questions call [facility]  [phone]

## 2020-01-08 ENCOUNTER — ANESTHESIA EVENT (OUTPATIENT)
Dept: OPERATING ROOM | Age: 35
End: 2020-01-08
Payer: COMMERCIAL

## 2020-01-08 ASSESSMENT — ENCOUNTER SYMPTOMS: SHORTNESS OF BREATH: 1

## 2020-01-08 NOTE — ANESTHESIA PRE PROCEDURE
Elastic Bandages & Supports (KNEE BRACE/CUSHION/L-XL) MISC Please dispense one knee brace to be worn for 8 hrs during the day and taken off at nighttime for right knee for comfort. 1 each 0    ibuprofen (ADVIL;MOTRIN) 600 MG tablet Take 1 tablet by mouth every 6 hours as needed for Pain or Fever 60 tablet 0    butalbital-acetaminophen-caffeine (FIORICET) -40 MG per tablet Take 1 tablet by mouth every 4 hours as needed for Headaches 10 tablet 0    gabapentin (NEURONTIN) 300 MG capsule Take 300 mg by mouth 3 times daily         Allergies:     Allergies   Allergen Reactions    Latex Hives    Ultram [Tramadol Hcl] Hives       Problem List:    Patient Active Problem List   Diagnosis Code    Pregnancy with poor obstetric history O09.299    Obesity complicating pregnancy in third trimester O99.213    Back pain in pregnancy O99.89, M54.9    Chest pain R07.9    Morbid obesity with BMI of 50.0-59.9, adult ([facility]) E66.01, Z68.43    Mild intermittent asthma without complication B31.76    Shortness of breath on exertion R06.02    BASHIR (obstructive sleep apnea) G47.33    Drug-induced constipation K59.03    Diarrhea R19.7    Arthritis M19.90    Bilateral chronic knee pain M25.561, M25.562, G89.29    Pain of both hip joints M25.551, M25.552    Chronic bilateral low back pain without sciatica M54.5, G89.29    Episodic tension-type headache, not intractable G44.219    Nausea and vomiting in adult R11.2    Reactive depression F32.9    Anxiety F41.9    Panic attack F41.0    Secondary hypertension I15.9    GOGO (stress urinary incontinence, female) N39.3    Ventral hernia without obstruction or gangrene K43.9       Past Medical History:        Diagnosis Date    Anxiety disorder     Arthritis     Asthma     Follows with Lizbeth    Bipolar disorder ([facility] [address]     Not on meds as of 12/3/19    Depression     currently not on meds (12/3/19)    Drug addiction ([facility] [address]     states previous addiction to vicodin tricuspid regurgitation. No evidence of any pericardial effusion. Neuro/Psych:   (+) headaches:, psychiatric history:             ROS comment: History of vicodin and cocaine abuse GI/Hepatic/Renal:   (+) morbid obesity          Endo/Other:                     Abdominal:   (+) obese,         Vascular:                                      Anesthesia Plan      general     ASA 4     (Chart review only.)  Induction: intravenous. MIPS: Postoperative opioids intended. Anesthetic plan and risks discussed with patient. Plan discussed with CRNA.     Attending anesthesiologist reviewed and agrees with Pre Eval content            MARU Gaffney - CRNA   1/8/2020

## 2020-01-09 ENCOUNTER — ANESTHESIA (OUTPATIENT)
Dept: OPERATING ROOM | Age: 35
End: 2020-01-09
Payer: COMMERCIAL

## 2020-01-09 ENCOUNTER — APPOINTMENT (OUTPATIENT)
Dept: GENERAL RADIOLOGY | Age: 35
End: 2020-01-09
Attending: SURGERY
Payer: COMMERCIAL

## 2020-01-09 ENCOUNTER — HOSPITAL ENCOUNTER (OUTPATIENT)
Age: 35
Setting detail: OBSERVATION
Discharge: HOME | End: 2020-01-10
Attending: SURGERY | Admitting: SURGERY
Payer: COMMERCIAL

## 2020-01-09 VITALS
DIASTOLIC BLOOD PRESSURE: 58 MMHG | SYSTOLIC BLOOD PRESSURE: 107 MMHG | OXYGEN SATURATION: 93 % | TEMPERATURE: 97.9 F | RESPIRATION RATE: 8 BRPM

## 2020-01-09 PROBLEM — K43.6 INCARCERATED VENTRAL HERNIA: Status: ACTIVE | Noted: 2020-01-09

## 2020-01-09 LAB
AMPHETAMINES: NEGATIVE
ANION GAP SERPL CALCULATED.3IONS-SCNC: 10 MMOL/L (ref 4–16)
BARBITURATE SCREEN URINE: NEGATIVE
BENZODIAZEPINE SCREEN, URINE: NEGATIVE
BUN BLDV-MCNC: 12 MG/DL (ref 6–23)
CALCIUM SERPL-MCNC: 9.3 MG/DL (ref 8.3–10.6)
CANNABINOID SCREEN URINE: NEGATIVE
CHLORIDE BLD-SCNC: 101 MMOL/L (ref 99–110)
CO2: 26 MMOL/L (ref 21–32)
COCAINE METABOLITE: NEGATIVE
CREAT SERPL-MCNC: 0.6 MG/DL (ref 0.6–1.1)
GFR AFRICAN AMERICAN: >60 ML/MIN/1.73M2
GFR NON-AFRICAN AMERICAN: >60 ML/MIN/1.73M2
GLUCOSE BLD-MCNC: 104 MG/DL (ref 70–99)
HCT VFR BLD CALC: 45.7 % (ref 37–47)
HEMOGLOBIN: 14.2 GM/DL (ref 12.5–16)
MCH RBC QN AUTO: 28.6 PG (ref 27–31)
MCHC RBC AUTO-ENTMCNC: 31.1 % (ref 32–36)
MCV RBC AUTO: 92.1 FL (ref 78–100)
OPIATES, URINE: NEGATIVE
OXYCODONE: NORMAL
PDW BLD-RTO: 12.4 % (ref 11.7–14.9)
PHENCYCLIDINE, URINE: NEGATIVE
PLATELET # BLD: 255 K/CU MM (ref 140–440)
PMV BLD AUTO: 9.8 FL (ref 7.5–11.1)
POTASSIUM SERPL-SCNC: 4.6 MMOL/L (ref 3.5–5.1)
RBC # BLD: 4.96 M/CU MM (ref 4.2–5.4)
SODIUM BLD-SCNC: 137 MMOL/L (ref 135–145)
WBC # BLD: 8 K/CU MM (ref 4–10.5)

## 2020-01-09 PROCEDURE — 6370000000 HC RX 637 (ALT 250 FOR IP): Performed by: SURGERY

## 2020-01-09 PROCEDURE — 6360000002 HC RX W HCPCS: Performed by: NURSE ANESTHETIST, CERTIFIED REGISTERED

## 2020-01-09 PROCEDURE — 2580000003 HC RX 258: Performed by: NURSE ANESTHETIST, CERTIFIED REGISTERED

## 2020-01-09 PROCEDURE — 80307 DRUG TEST PRSMV CHEM ANLYZR: CPT

## 2020-01-09 PROCEDURE — G0378 HOSPITAL OBSERVATION PER HR: HCPCS

## 2020-01-09 PROCEDURE — 7100000000 HC PACU RECOVERY - FIRST 15 MIN: Performed by: SURGERY

## 2020-01-09 PROCEDURE — 3600000019 HC SURGERY ROBOT ADDTL 15MIN: Performed by: SURGERY

## 2020-01-09 PROCEDURE — 94010 BREATHING CAPACITY TEST: CPT

## 2020-01-09 PROCEDURE — C9290 INJ, BUPIVACAINE LIPOSOME: HCPCS | Performed by: SURGERY

## 2020-01-09 PROCEDURE — 85027 COMPLETE CBC AUTOMATED: CPT

## 2020-01-09 PROCEDURE — 3600000009 HC SURGERY ROBOT BASE: Performed by: SURGERY

## 2020-01-09 PROCEDURE — 94150 VITAL CAPACITY TEST: CPT

## 2020-01-09 PROCEDURE — 71045 X-RAY EXAM CHEST 1 VIEW: CPT

## 2020-01-09 PROCEDURE — C1781 MESH (IMPLANTABLE): HCPCS | Performed by: SURGERY

## 2020-01-09 PROCEDURE — 2580000003 HC RX 258: Performed by: SURGERY

## 2020-01-09 PROCEDURE — 6360000002 HC RX W HCPCS: Performed by: SURGERY

## 2020-01-09 PROCEDURE — 49653 PR LAP, VENTRAL HERNIA REPAIR,INCARCERATED: CPT | Performed by: SURGERY

## 2020-01-09 PROCEDURE — 2709999900 HC NON-CHARGEABLE SUPPLY: Performed by: SURGERY

## 2020-01-09 PROCEDURE — 2500000003 HC RX 250 WO HCPCS: Performed by: NURSE ANESTHETIST, CERTIFIED REGISTERED

## 2020-01-09 PROCEDURE — 3700000000 HC ANESTHESIA ATTENDED CARE: Performed by: SURGERY

## 2020-01-09 PROCEDURE — 6370000000 HC RX 637 (ALT 250 FOR IP): Performed by: PHYSICIAN ASSISTANT

## 2020-01-09 PROCEDURE — 94660 CPAP INITIATION&MGMT: CPT

## 2020-01-09 PROCEDURE — 80048 BASIC METABOLIC PNL TOTAL CA: CPT

## 2020-01-09 PROCEDURE — 7100000001 HC PACU RECOVERY - ADDTL 15 MIN: Performed by: SURGERY

## 2020-01-09 PROCEDURE — 94761 N-INVAS EAR/PLS OXIMETRY MLT: CPT

## 2020-01-09 PROCEDURE — S2900 ROBOTIC SURGICAL SYSTEM: HCPCS | Performed by: SURGERY

## 2020-01-09 PROCEDURE — 6360000002 HC RX W HCPCS: Performed by: ANESTHESIOLOGY

## 2020-01-09 PROCEDURE — 3700000001 HC ADD 15 MINUTES (ANESTHESIA): Performed by: SURGERY

## 2020-01-09 PROCEDURE — 2500000003 HC RX 250 WO HCPCS: Performed by: SURGERY

## 2020-01-09 DEVICE — MESH HERN M DIA6.4CM VENTRAL POLYPR EPTFE CIR SELF EXP PTCH: Type: IMPLANTABLE DEVICE | Status: FUNCTIONAL

## 2020-01-09 RX ORDER — ONDANSETRON 2 MG/ML
4 INJECTION INTRAMUSCULAR; INTRAVENOUS
Status: DISCONTINUED | OUTPATIENT
Start: 2020-01-09 | End: 2020-01-09 | Stop reason: HOSPADM

## 2020-01-09 RX ORDER — SODIUM CHLORIDE 0.9 % (FLUSH) 0.9 %
10 SYRINGE (ML) INJECTION EVERY 12 HOURS SCHEDULED
Status: DISCONTINUED | OUTPATIENT
Start: 2020-01-09 | End: 2020-01-10 | Stop reason: HOSPADM

## 2020-01-09 RX ORDER — AMOXICILLIN 250 MG
2 CAPSULE ORAL DAILY
Qty: 60 TABLET | Refills: 3 | Status: SHIPPED | OUTPATIENT
Start: 2020-01-09 | End: 2020-01-19

## 2020-01-09 RX ORDER — ONDANSETRON 2 MG/ML
INJECTION INTRAMUSCULAR; INTRAVENOUS PRN
Status: DISCONTINUED | OUTPATIENT
Start: 2020-01-09 | End: 2020-01-09 | Stop reason: SDUPTHER

## 2020-01-09 RX ORDER — HYDROMORPHONE HCL 110MG/55ML
0.5 PATIENT CONTROLLED ANALGESIA SYRINGE INTRAVENOUS EVERY 5 MIN PRN
Status: DISCONTINUED | OUTPATIENT
Start: 2020-01-09 | End: 2020-01-09 | Stop reason: HOSPADM

## 2020-01-09 RX ORDER — IPRATROPIUM BROMIDE AND ALBUTEROL SULFATE 2.5; .5 MG/3ML; MG/3ML
SOLUTION RESPIRATORY (INHALATION)
Status: DISPENSED
Start: 2020-01-09 | End: 2020-01-10

## 2020-01-09 RX ORDER — PROPOFOL 10 MG/ML
INJECTION, EMULSION INTRAVENOUS PRN
Status: DISCONTINUED | OUTPATIENT
Start: 2020-01-09 | End: 2020-01-09 | Stop reason: SDUPTHER

## 2020-01-09 RX ORDER — MIDAZOLAM HYDROCHLORIDE 1 MG/ML
INJECTION INTRAMUSCULAR; INTRAVENOUS PRN
Status: DISCONTINUED | OUTPATIENT
Start: 2020-01-09 | End: 2020-01-09 | Stop reason: SDUPTHER

## 2020-01-09 RX ORDER — DEXAMETHASONE SODIUM PHOSPHATE 4 MG/ML
INJECTION, SOLUTION INTRA-ARTICULAR; INTRALESIONAL; INTRAMUSCULAR; INTRAVENOUS; SOFT TISSUE PRN
Status: DISCONTINUED | OUTPATIENT
Start: 2020-01-09 | End: 2020-01-09 | Stop reason: SDUPTHER

## 2020-01-09 RX ORDER — OXYCODONE HYDROCHLORIDE AND ACETAMINOPHEN 5; 325 MG/1; MG/1
1 TABLET ORAL EVERY 4 HOURS PRN
Status: DISCONTINUED | OUTPATIENT
Start: 2020-01-09 | End: 2020-01-10 | Stop reason: HOSPADM

## 2020-01-09 RX ORDER — MAGNESIUM SULFATE 1 G/100ML
1 INJECTION INTRAVENOUS PRN
Status: DISCONTINUED | OUTPATIENT
Start: 2020-01-09 | End: 2020-01-10 | Stop reason: HOSPADM

## 2020-01-09 RX ORDER — ONDANSETRON 2 MG/ML
4 INJECTION INTRAMUSCULAR; INTRAVENOUS EVERY 4 HOURS PRN
Status: DISCONTINUED | OUTPATIENT
Start: 2020-01-09 | End: 2020-01-10 | Stop reason: HOSPADM

## 2020-01-09 RX ORDER — OXYCODONE HYDROCHLORIDE AND ACETAMINOPHEN 5; 325 MG/1; MG/1
1-2 TABLET ORAL EVERY 6 HOURS PRN
Qty: 35 TABLET | Refills: 0 | Status: SHIPPED | OUTPATIENT
Start: 2020-01-09 | End: 2020-01-16

## 2020-01-09 RX ORDER — NICOTINE 21 MG/24HR
1 PATCH, TRANSDERMAL 24 HOURS TRANSDERMAL DAILY
Status: DISCONTINUED | OUTPATIENT
Start: 2020-01-09 | End: 2020-01-10 | Stop reason: HOSPADM

## 2020-01-09 RX ORDER — ACETAMINOPHEN 10 MG/ML
INJECTION, SOLUTION INTRAVENOUS PRN
Status: DISCONTINUED | OUTPATIENT
Start: 2020-01-09 | End: 2020-01-09 | Stop reason: SDUPTHER

## 2020-01-09 RX ORDER — SODIUM CHLORIDE, SODIUM LACTATE, POTASSIUM CHLORIDE, CALCIUM CHLORIDE 600; 310; 30; 20 MG/100ML; MG/100ML; MG/100ML; MG/100ML
INJECTION, SOLUTION INTRAVENOUS CONTINUOUS PRN
Status: DISCONTINUED | OUTPATIENT
Start: 2020-01-09 | End: 2020-01-09 | Stop reason: SDUPTHER

## 2020-01-09 RX ORDER — CHLORHEXIDINE GLUCONATE 4 G/100ML
SOLUTION TOPICAL ONCE
Status: DISCONTINUED | OUTPATIENT
Start: 2020-01-09 | End: 2020-01-10 | Stop reason: HOSPADM

## 2020-01-09 RX ORDER — ALBUTEROL SULFATE 90 UG/1
2 AEROSOL, METERED RESPIRATORY (INHALATION) 4 TIMES DAILY
Status: DISCONTINUED | OUTPATIENT
Start: 2020-01-09 | End: 2020-01-10 | Stop reason: HOSPADM

## 2020-01-09 RX ORDER — ACETAMINOPHEN 500 MG
500 TABLET ORAL EVERY 6 HOURS PRN
Status: DISCONTINUED | OUTPATIENT
Start: 2020-01-09 | End: 2020-01-10 | Stop reason: HOSPADM

## 2020-01-09 RX ORDER — SODIUM CHLORIDE 0.9 % (FLUSH) 0.9 %
10 SYRINGE (ML) INJECTION PRN
Status: DISCONTINUED | OUTPATIENT
Start: 2020-01-09 | End: 2020-01-10 | Stop reason: HOSPADM

## 2020-01-09 RX ORDER — SODIUM CHLORIDE, SODIUM LACTATE, POTASSIUM CHLORIDE, CALCIUM CHLORIDE 600; 310; 30; 20 MG/100ML; MG/100ML; MG/100ML; MG/100ML
INJECTION, SOLUTION INTRAVENOUS CONTINUOUS
Status: DISCONTINUED | OUTPATIENT
Start: 2020-01-09 | End: 2020-01-10 | Stop reason: HOSPADM

## 2020-01-09 RX ORDER — HYDROMORPHONE HCL 110MG/55ML
0.25 PATIENT CONTROLLED ANALGESIA SYRINGE INTRAVENOUS EVERY 5 MIN PRN
Status: DISCONTINUED | OUTPATIENT
Start: 2020-01-09 | End: 2020-01-09 | Stop reason: HOSPADM

## 2020-01-09 RX ORDER — LABETALOL 20 MG/4 ML (5 MG/ML) INTRAVENOUS SYRINGE
5 EVERY 10 MIN PRN
Status: DISCONTINUED | OUTPATIENT
Start: 2020-01-09 | End: 2020-01-09 | Stop reason: HOSPADM

## 2020-01-09 RX ORDER — FENTANYL CITRATE 50 UG/ML
INJECTION, SOLUTION INTRAMUSCULAR; INTRAVENOUS PRN
Status: DISCONTINUED | OUTPATIENT
Start: 2020-01-09 | End: 2020-01-09 | Stop reason: SDUPTHER

## 2020-01-09 RX ORDER — SODIUM CHLORIDE 9 MG/ML
INJECTION, SOLUTION INTRAVENOUS
Status: DISPENSED
Start: 2020-01-09 | End: 2020-01-10

## 2020-01-09 RX ORDER — SUCCINYLCHOLINE/SOD CL,ISO/PF 100 MG/5ML
SYRINGE (ML) INTRAVENOUS PRN
Status: DISCONTINUED | OUTPATIENT
Start: 2020-01-09 | End: 2020-01-09 | Stop reason: SDUPTHER

## 2020-01-09 RX ORDER — KETAMINE HYDROCHLORIDE 10 MG/ML
INJECTION, SOLUTION INTRAMUSCULAR; INTRAVENOUS PRN
Status: DISCONTINUED | OUTPATIENT
Start: 2020-01-09 | End: 2020-01-09 | Stop reason: SDUPTHER

## 2020-01-09 RX ORDER — ROCURONIUM BROMIDE 10 MG/ML
INJECTION, SOLUTION INTRAVENOUS PRN
Status: DISCONTINUED | OUTPATIENT
Start: 2020-01-09 | End: 2020-01-09 | Stop reason: SDUPTHER

## 2020-01-09 RX ORDER — HYDRALAZINE HYDROCHLORIDE 20 MG/ML
5 INJECTION INTRAMUSCULAR; INTRAVENOUS EVERY 10 MIN PRN
Status: DISCONTINUED | OUTPATIENT
Start: 2020-01-09 | End: 2020-01-09 | Stop reason: HOSPADM

## 2020-01-09 RX ORDER — VANCOMYCIN HYDROCHLORIDE 1 G/20ML
INJECTION, POWDER, LYOPHILIZED, FOR SOLUTION INTRAVENOUS
Status: COMPLETED | OUTPATIENT
Start: 2020-01-09 | End: 2020-01-09

## 2020-01-09 RX ORDER — SODIUM CHLORIDE, SODIUM LACTATE, POTASSIUM CHLORIDE, CALCIUM CHLORIDE 600; 310; 30; 20 MG/100ML; MG/100ML; MG/100ML; MG/100ML
INJECTION, SOLUTION INTRAVENOUS ONCE
Status: COMPLETED | OUTPATIENT
Start: 2020-01-09 | End: 2020-01-09

## 2020-01-09 RX ORDER — GABAPENTIN 300 MG/1
300 CAPSULE ORAL 3 TIMES DAILY
Status: DISCONTINUED | OUTPATIENT
Start: 2020-01-09 | End: 2020-01-10 | Stop reason: HOSPADM

## 2020-01-09 RX ORDER — GENTAMICIN SULFATE 40 MG/ML
INJECTION, SOLUTION INTRAMUSCULAR; INTRAVENOUS
Status: COMPLETED | OUTPATIENT
Start: 2020-01-09 | End: 2020-01-09

## 2020-01-09 RX ORDER — IPRATROPIUM BROMIDE AND ALBUTEROL SULFATE 2.5; .5 MG/3ML; MG/3ML
1 SOLUTION RESPIRATORY (INHALATION)
Status: DISCONTINUED | OUTPATIENT
Start: 2020-01-09 | End: 2020-01-10 | Stop reason: HOSPADM

## 2020-01-09 RX ORDER — HEPARIN SODIUM 5000 [USP'U]/ML
5000 INJECTION, SOLUTION INTRAVENOUS; SUBCUTANEOUS ONCE
Status: COMPLETED | OUTPATIENT
Start: 2020-01-09 | End: 2020-01-09

## 2020-01-09 RX ORDER — LIDOCAINE HYDROCHLORIDE 20 MG/ML
INJECTION, SOLUTION INTRAVENOUS PRN
Status: DISCONTINUED | OUTPATIENT
Start: 2020-01-09 | End: 2020-01-09 | Stop reason: SDUPTHER

## 2020-01-09 RX ORDER — POTASSIUM CHLORIDE 7.45 MG/ML
10 INJECTION INTRAVENOUS PRN
Status: DISCONTINUED | OUTPATIENT
Start: 2020-01-09 | End: 2020-01-10 | Stop reason: HOSPADM

## 2020-01-09 RX ADMIN — LIDOCAINE HYDROCHLORIDE 100 MG: 20 INJECTION, SOLUTION INTRAVENOUS at 10:08

## 2020-01-09 RX ADMIN — OXYCODONE HYDROCHLORIDE AND ACETAMINOPHEN 1 TABLET: 5; 325 TABLET ORAL at 23:29

## 2020-01-09 RX ADMIN — ONDANSETRON 4 MG: 2 INJECTION INTRAMUSCULAR; INTRAVENOUS at 16:24

## 2020-01-09 RX ADMIN — SODIUM CHLORIDE, PRESERVATIVE FREE 10 ML: 5 INJECTION INTRAVENOUS at 23:11

## 2020-01-09 RX ADMIN — PROPOFOL 200 MG: 10 INJECTION, EMULSION INTRAVENOUS at 10:08

## 2020-01-09 RX ADMIN — FENTANYL CITRATE 50 MCG: 50 INJECTION INTRAMUSCULAR; INTRAVENOUS at 10:45

## 2020-01-09 RX ADMIN — ONDANSETRON 4 MG: 2 INJECTION INTRAMUSCULAR; INTRAVENOUS at 11:23

## 2020-01-09 RX ADMIN — SODIUM CHLORIDE, POTASSIUM CHLORIDE, SODIUM LACTATE AND CALCIUM CHLORIDE: 600; 310; 30; 20 INJECTION, SOLUTION INTRAVENOUS at 09:50

## 2020-01-09 RX ADMIN — HYDROMORPHONE HYDROCHLORIDE 1 MG: 1 INJECTION, SOLUTION INTRAMUSCULAR; INTRAVENOUS; SUBCUTANEOUS at 17:24

## 2020-01-09 RX ADMIN — HYDROMORPHONE HYDROCHLORIDE 0.25 MG: 2 INJECTION, SOLUTION INTRAMUSCULAR; INTRAVENOUS; SUBCUTANEOUS at 12:15

## 2020-01-09 RX ADMIN — KETAMINE HYDROCHLORIDE 10 MG: 10 INJECTION INTRAMUSCULAR; INTRAVENOUS at 11:11

## 2020-01-09 RX ADMIN — IPRATROPIUM BROMIDE AND ALBUTEROL SULFATE 1 AMPULE: .5; 3 SOLUTION RESPIRATORY (INHALATION) at 22:00

## 2020-01-09 RX ADMIN — FENTANYL CITRATE 100 MCG: 50 INJECTION INTRAMUSCULAR; INTRAVENOUS at 10:06

## 2020-01-09 RX ADMIN — ROCURONIUM BROMIDE 50 MG: 10 INJECTION INTRAVENOUS at 10:12

## 2020-01-09 RX ADMIN — FENTANYL CITRATE 50 MCG: 50 INJECTION INTRAMUSCULAR; INTRAVENOUS at 10:54

## 2020-01-09 RX ADMIN — HYDROMORPHONE HYDROCHLORIDE 0.25 MG: 2 INJECTION, SOLUTION INTRAMUSCULAR; INTRAVENOUS; SUBCUTANEOUS at 14:19

## 2020-01-09 RX ADMIN — SODIUM CHLORIDE, POTASSIUM CHLORIDE, SODIUM LACTATE AND CALCIUM CHLORIDE: 600; 310; 30; 20 INJECTION, SOLUTION INTRAVENOUS at 09:22

## 2020-01-09 RX ADMIN — SODIUM CHLORIDE, POTASSIUM CHLORIDE, SODIUM LACTATE AND CALCIUM CHLORIDE: 600; 310; 30; 20 INJECTION, SOLUTION INTRAVENOUS at 14:43

## 2020-01-09 RX ADMIN — Medication 100 MG: at 10:08

## 2020-01-09 RX ADMIN — ROCURONIUM BROMIDE 15 MG: 10 INJECTION INTRAVENOUS at 10:54

## 2020-01-09 RX ADMIN — DEXTROSE MONOHYDRATE 3 G: 50 INJECTION, SOLUTION INTRAVENOUS at 10:16

## 2020-01-09 RX ADMIN — GABAPENTIN 300 MG: 300 CAPSULE ORAL at 23:08

## 2020-01-09 RX ADMIN — HEPARIN SODIUM 5000 UNITS: 5000 INJECTION, SOLUTION INTRAVENOUS; SUBCUTANEOUS at 09:21

## 2020-01-09 RX ADMIN — DEXTROSE MONOHYDRATE 3 G: 50 INJECTION, SOLUTION INTRAVENOUS at 18:37

## 2020-01-09 RX ADMIN — DEXAMETHASONE SODIUM PHOSPHATE 8 MG: 4 INJECTION, SOLUTION INTRAMUSCULAR; INTRAVENOUS at 10:15

## 2020-01-09 RX ADMIN — ROCURONIUM BROMIDE 10 MG: 10 INJECTION INTRAVENOUS at 10:28

## 2020-01-09 RX ADMIN — KETAMINE HYDROCHLORIDE 30 MG: 10 INJECTION INTRAMUSCULAR; INTRAVENOUS at 10:08

## 2020-01-09 RX ADMIN — ACETAMINOPHEN 1000 MG: 10 INJECTION, SOLUTION INTRAVENOUS at 10:43

## 2020-01-09 RX ADMIN — Medication 1 PACKET: at 23:08

## 2020-01-09 RX ADMIN — MIDAZOLAM 2 MG: 1 INJECTION INTRAMUSCULAR; INTRAVENOUS at 10:06

## 2020-01-09 ASSESSMENT — PULMONARY FUNCTION TESTS
PIF_VALUE: 26
PIF_VALUE: 36
PIF_VALUE: 35
PIF_VALUE: 34
PIF_VALUE: 35
PIF_VALUE: 34
PIF_VALUE: 35
PIF_VALUE: 35
PIF_VALUE: 22
PIF_VALUE: 0
PIF_VALUE: 35
PIF_VALUE: 36
PIF_VALUE: 36
PIF_VALUE: 35
PIF_VALUE: 0
PIF_VALUE: 32
PIF_VALUE: 36
PIF_VALUE: 35
PIF_VALUE: 32
PIF_VALUE: 35
PIF_VALUE: 35
PIF_VALUE: 23
PIF_VALUE: 35
PIF_VALUE: 35
PIF_VALUE: 36
PIF_VALUE: 35
PIF_VALUE: 36
PIF_VALUE: 35
PIF_VALUE: 36
PIF_VALUE: 32
PIF_VALUE: 34
PIF_VALUE: 3
PIF_VALUE: 35
PIF_VALUE: 32
PIF_VALUE: 40
PIF_VALUE: 0
PIF_VALUE: 5
PIF_VALUE: 35
PIF_VALUE: 35
PIF_VALUE: 23
PIF_VALUE: 23
PIF_VALUE: 36
PIF_VALUE: 35
PIF_VALUE: 32
PIF_VALUE: 36
PIF_VALUE: 32
PIF_VALUE: 36
PIF_VALUE: 0
PIF_VALUE: 36
PIF_VALUE: 36
PIF_VALUE: 35
PIF_VALUE: 36
PIF_VALUE: 29
PIF_VALUE: 29
PIF_VALUE: 36
PIF_VALUE: 35
PIF_VALUE: 12
PIF_VALUE: 3
PIF_VALUE: 36
PIF_VALUE: 35
PIF_VALUE: 34
PIF_VALUE: 35
PIF_VALUE: 34
PIF_VALUE: 22
PIF_VALUE: 35
PIF_VALUE: 28
PIF_VALUE: 23
PIF_VALUE: 36
PIF_VALUE: 36
PIF_VALUE: 22
PIF_VALUE: 27
PIF_VALUE: 36
PIF_VALUE: 35
PIF_VALUE: 23
PIF_VALUE: 35
PIF_VALUE: 10
PIF_VALUE: 34
PIF_VALUE: 36
PIF_VALUE: 34
PIF_VALUE: 29
PIF_VALUE: 35
PIF_VALUE: 2
PIF_VALUE: 43
PIF_VALUE: 33
PIF_VALUE: 35
PIF_VALUE: 27
PIF_VALUE: 36
PIF_VALUE: 33
PIF_VALUE: 32
PIF_VALUE: 35
PIF_VALUE: 1
PIF_VALUE: 0
PIF_VALUE: 35

## 2020-01-09 ASSESSMENT — PAIN SCALES - GENERAL
PAINLEVEL_OUTOF10: 8
PAINLEVEL_OUTOF10: 10
PAINLEVEL_OUTOF10: 10

## 2020-01-09 NOTE — PROGRESS NOTES
Per lab they did not run urine drug screen (sent about 20 minutes ago) because it has a white patient label not a pink patient label. ( did not print out stickers so chart label was placed on specimen)  This RN wrote stat and highlighted stat on label prior to sending the urine down. Lab employee Ignacio Bowling stated we prioritize blood.

## 2020-01-09 NOTE — PROGRESS NOTES
1149  Patient arrived to PACU placed on monitor and oxygen via simple mask. Patient responsive to verbal stimuli, oriented to person and place. States she has very little pain at this time. Report received from CRNA and OR nurse. 1155  Patient assessed, lung sounds coarse, cough and deep breathing encouraged. 1200  Simple mask in place pt's sat running in the low 90's. 1210  Patient turned from side to side, tolerated well,  HOB up for better air movement. C&DB performed to clear lungs. Patient oxygen changed over from mask to NC at 3L. 26  Medicated for abdominal pain as ordered. 1220  Patient resting quietly. 1225  Patient taking ice chips without difficulty. Oxygen decreased to 2L/NC.    1233 Anesthesia at bedside, updated on condition. Orders received from anesthesia. [place] given as ordered and xray notified of stat order. 1242  Xray here and done as ordered. 1248  Patient just stated that she is suppose to wear her CPAP machine but does not wear it, I explained to patient the importance of using her CPAP machine on a regular basis. 1255 anesthesia notified that patients sat is still in the low 90's after breathing treatment. Patient is to be held in pacu until xray has been read and reassessed by anesthesia. Resp notified of order for CPAP.      1305  Patient on phone with her mother regarding possible admission. 1315 CPAP started as ordered per respiratory    1320  Anesthesia at bedside and explained to patient the need to be admitted for observation. Patient on phone with family to setup arrangements for someone to watch her children. 1330  Patient has taken CPAP off to talk on phone o2 sat without oxygen has ranged 95%. Anesthesia at bedside. 1345  Patient took CPAP mask off and eating ice chips. 1350  Waiting on Dr Jefferson Brewer to see patient. 0  Dr. Jefferson Brewer at bedside speaking to patient regarding admission. Patient agreeable to admission. 408 [facility] belonging gathered from [facility]. Patient has spoken to family regarding admission. 1419  Remedicated for pain, patient will be admitted for observation. 1448 report called to CATRACHITO martinez    1451  Patient transferred to [facility] via cart per RN's X 2.

## 2020-01-09 NOTE — ANESTHESIA POSTPROCEDURE EVALUATION
Department of Anesthesiology  Postprocedure Note    Patient: Danielle De La Torre  MRN: [id number]  YOB: 1985  Date of evaluation: 1/9/2020  Time:  12:07 PM     Procedure Summary     Date:  01/09/20 Room / Location:  [address] [place] [address]    Anesthesia Start:  1001 Anesthesia Stop:  0957    Procedure: HERNIA SUPRAUMBILICAL/ VENTRAL REPAIR LAPAROSCOPIC ROBOTIC WITH MESH (N/A Abdomen) Diagnosis:  (umbilical, ventral hernias)    Surgeon:  Aisha Jaquez MD Responsible Provider:  Yudy Corado MD    Anesthesia Type:  general ASA Status:  4          Anesthesia Type: general    Louise Phase I:      Louise Phase II:      Last vitals: Reviewed and per EMR flowsheets. Anesthesia Post Evaluation    Patient location during evaluation: PACU  Patient participation: complete - patient participated  Level of consciousness: sleepy but conscious  Pain score: 2  Airway patency: patent  Nausea & Vomiting: no nausea and no vomiting  Complications: no  Cardiovascular status: hemodynamically stable  Respiratory status: CPAP  Hydration status: euvolemic  Comments: Await SpO2 to improve with CPAP  No significant improvement of saturation after nebulizer tx  Rec: Admit for overnight observation for desaturation post op, Possibly due to Splinting secondary to body habitus.   Surgeon to admit

## 2020-01-09 NOTE — PROGRESS NOTES
Patient instructed and educated on Garmentory. Patient able to do 1500ml. Vital capacity  Patient's goal is 3100ml.  Electronically signed by Frida Muse RCP on 1/9/2020 at 3:39 PM

## 2020-01-09 NOTE — H&P
HOSPITALIST NON-PHYSICIAN PROVIDER CONSULTATION NOTE                      Name:  Doe Lawrence /Age/Sex: 1985  (36 y.o. female)   MRN & CSN:  [id number] & [id number] Admission Date/Time: 2020  7:50 AM   Location:  9824/2335-J Attending:  Marc Jain MD                                                  REASON FOR CONSULTATION:  Medical Management Depression, Anxiety, Asthma, drug addiction, and Bipolar    HPI  Doe Lawrence is a 36 y.o. female who presents to with chief complaint of 4/10 intermittent nonradiating abdominal pain with nausea s/p ventral hernia repair. Patient had lap DaVinci robotic assisted ventral incision herniorrhaphy with repair followed by Bard dual mesh and significant lysis of omental adhesions supra pubically. She is getting Dilaudid  For pain management and Zofran for nausea. She does have hx of Vicodin addiction, Asthma, Anxiety, Bipolar, and Arthritis. SUBJECTIVE    10 point review of systems reviewed and negative unless noted above. ALLERGIES PCP    Allergies   Allergen Reactions    Latex Hives    Ultram [Tramadol Hcl] Hives Treasa Frankel, MD   PAST MEDICAL HISTORY SURGICAL HISTORY   Past Medical History:   Diagnosis Date    Anxiety disorder     Arthritis     Asthma     Follows with Lizbeth    Bipolar disorder ([facility] [address]     Not on meds as of 12/3/19    Depression     currently not on meds (12/3/19)    Drug addiction ([facility] [address]     states previous addiction to vicodin (), denies current use. Hx + UDS for cocaine, 12/3/2019    H/O echocardiogram 2018    Technically difficult examination due to body habitus. Left ventricular function is low normal, EF is estimated at 45-50%. Mild concentric left ventricular hypertrophy. Mildly dilated left atrium. Right ventricular systolic pressure of 29 mm Hg. Mild tricuspid regurgitation, no evidence of any pericardial effusion.      QWUQLRTB(175.2)     Last headache:  19    History of exercise stress test 2017    treadmill    Miscarriage     x4    Pain management     Was Dr. Bucky Cummings - left his practice 2019    Past Surgical History:   Procedure Laterality Date    CARPAL TUNNEL RELEASE Left 10/2019    Dr. Elizabeth Kathleen Right 2016     SECTION       &    [id number] [facility] OF UTERUS       & 2011    KNEE ARTHROSCOPY Right 2019    Dr. Kate Xavier      Several on bottom & top - no partials    TUBAL LIGATION  2014      SOCIAL HISTORY FAMILY HISTORY   Social History     Socioeconomic History    Marital status: Single     Spouse name: None    Number of children: None    Years of education: None    Highest education level: None   Occupational History    None   Social Needs    Financial resource strain: None    Food insecurity:     Worry: None     Inability: None    Transportation needs:     Medical: None     Non-medical: None   Tobacco Use    Smoking status: Current Every Day Smoker     Packs/day: 1.00     Years: 18.00     Pack years: 18.00     Types: Cigarettes     Start date:     Smokeless tobacco: Never Used   Substance and Sexual Activity    Alcohol use: No    Drug use: Not Currently     Types: Marijuana     Comment: Last used:     Sexual activity: Yes     Partners: Male   Lifestyle    Physical activity:     Days per week: None     Minutes per session: None    Stress: None   Relationships    Social connections:     Talks on phone: None     Gets together: None     Attends Orthodoxy service: None     Active member of club or organization: None     Attends meetings of clubs or organizations: None     Relationship status: None    Intimate partner violence:     Fear of current or ex partner: None     Emotionally abused: None     Physically abused: None     Forced sexual activity: None   Other Topics Concern    None   Social History Narrative    None    Family History   Problem Relation Age of Onset    Diabetes Mother     High Blood Pressure Mother     Heart Disease Mother     Heart Disease Maternal Grandmother     Heart Disease Maternal Grandfather        HOME MEDICATIONS    Prior to Admission medications    Medication Sig Start Date End Date Taking? Authorizing Provider   oxyCODONE-acetaminophen (PERCOCET) 5-325 MG per tablet Take 1-2 tablets by mouth every 6 hours as needed for Pain for up to 7 days. 1/9/20 1/16/20 Yes Jaden Flores MD   senna-docusate (SENOKOT S) 8.6-50 MG per tablet Take 2 tablets by mouth daily for 10 days 1/9/20 1/19/20 Yes Jaden Flores MD   Psyllium (METAMUCIL) 28.3 % POWD Take 862 g by mouth daily 10/23/19   Jaden Flores MD   acetaminophen (APAP EXTRA STRENGTH) 500 MG tablet Take 1 tablet by mouth every 6 hours as needed for Pain 9/8/19   Renu Linares PA-C   albuterol sulfate  (90 Base) MCG/ACT inhaler Inhale 2 puffs into the lungs 4 times daily 4/16/19   Demar Espinoza MD   Elastic Bandages & Supports (KNEE BRACE/CUSHION/L-XL) MISC Please dispense one knee brace to be worn for 8 hrs during the day and taken off at nighttime for right knee for comfort.  9/5/18   Renu Linares PA-C   ibuprofen (ADVIL;MOTRIN) 600 MG tablet Take 1 tablet by mouth every 6 hours as needed for Pain or Fever 2/19/17   MARU Hurtado - CNP   gabapentin (NEURONTIN) 300 MG capsule Take 300 mg by mouth 3 times daily    Historical Provider, MD          OBJECTIVE  Vitals:    01/09/20 1539   BP: (!) 132/115   Pulse: 85   Resp: 18   Temp: 98.2 °F (36.8 °C)   SpO2: 96%       Intake/Output Summary (Last 24 hours) at 1/9/2020 1643  Last data filed at 1/9/2020 1625  Gross per 24 hour   Intake 900 ml   Output 435 ml   Net 465 ml       Intake/Output Summary (Last 24 hours) at 1/9/2020 1643  Last data filed at 1/9/2020 1625  Gross per 24 hour   Intake 900 ml   Output 435 ml   Net 465 ml             I/O this shift:  In: -   Out: 425 [Urine:425]    PHYSICAL EXAM   Gen: awake, alert, cooperative, no apparent distress. Morbid obese  EYES:Lids and lashes normal, pupils equal, round ,extra ocular muscles intact, sclera clear, conjunctiva normal  ENT:  Normocephalic, oral pharynx with moist mucus membranes  NECK:  Supple, symmetrical, trachea midline, no adenopathy,  LUNGS:  Clear to auscultate bilaterally, no rales ronchi or wheezing noted. CARDIOVASCULAR:  regular rate and rhythm, normal S1 and S2,no murmur noted, peripheral pulses 2+, no pitting edema  ABDOMEN: Normal BS, mild umbilical tenderness, non distended, no HSM noted. 4 stab wounds noted covered with dermabond  MUSCULOSKELETAL:  ROM of all extremities grossly wnl  NEUROLOGIC: AOx 3,  Cranial nerves II-XII are grossly intact. Motor is 5 out of 5 bilaterally. Sensory is intact, no lateralizing findings.    SKIN:  no bruising or bleeding, normal skin color, turgor, no redness, warmth, or swelling          LABS  Results for orders placed or performed during the hospital encounter of 87/78/61   Basic Metabolic Panel   Result Value Ref Range    Sodium 137 135 - 145 MMOL/L    Potassium 4.6 3.5 - 5.1 MMOL/L    Chloride 101 99 - 110 mMol/L    CO2 26 21 - 32 MMOL/L    Anion Gap 10 4 - 16    BUN 12 6 - 23 MG/DL    CREATININE 0.6 0.6 - 1.1 MG/DL    Glucose 104 (H) 70 - 99 MG/DL    Calcium 9.3 8.3 - 10.6 MG/DL    GFR Non-African American >60 >60 mL/min/1.73m2    GFR African American >60 >60 mL/min/1.73m2   CBC   Result Value Ref Range    WBC 8.0 4.0 - 10.5 K/CU MM    RBC 4.96 4.2 - 5.4 M/CU MM    Hemoglobin 14.2 12.5 - 16.0 GM/DL    Hematocrit 45.7 37 - 47 %    MCV 92.1 78 - 100 FL    MCH 28.6 27 - 31 PG    MCHC 31.1 (L) 32.0 - 36.0 %    RDW 12.4 11.7 - 14.9 %    Platelets 830 365 - 517 K/CU MM    MPV 9.8 7.5 - 11.1 FL   Urine Drug Screen   Result Value Ref Range    Cannabinoid Scrn, Ur NEGATIVE NEGATIVE    Amphetamines NEGATIVE NEGATIVE    Cocaine Metabolite NEGATIVE NEGATIVE    Benzodiazepine Screen, Urine NEGATIVE NEGATIVE Barbiturate Screen, Ur NEGATIVE NEGATIVE    Opiates, Urine NEGATIVE NEGATIVE    Phencyclidine, Urine NEGATIVE NEGATIVE    Oxycodone  NEGATIVE     NEGATIVE          THRESHOLD CONCENTRATIONS (mg/dL)  AMPHT               1000  RACHEL,OPIA             300  BZO,BAR              200  PCP                   25  THC                   50  OXY                  100          IF POSITIVE, SPECIMEN WILL BE  DISCARDED AFTER 6 MONTHS. CALL LAB IF CONFIRMATION NEEDED. ALL NEGATIVE SPECIMENS WILL BE  DISCARDED AFTER ONE WEEK. * UNCONFIRMED POSITIVES MAY  NOT MEET FORENSIC REQUIREMENTS.                   IMAGING      MEDS  Scheduled Meds:   chlorhexidine   Topical Once    IV syringe builder   Other Once    ipratropium-albuterol  1 ampule Inhalation Q4H WA    ipratropium-albuterol        gabapentin  300 mg Oral TID    psyllium  1 Package Oral Daily    albuterol sulfate HFA  2 puff Inhalation 4x Daily    sodium chloride flush  10 mL Intravenous 2 times per day    ceFAZolin (ANCEF) IVPB  3 g Intravenous Q8H    [START ON 1/10/2020] enoxaparin  40 mg Subcutaneous Daily     Continuous Infusions:   sodium chloride      lactated ringers 50 mL/hr at 01/09/20 1443     PRN Meds:acetaminophen, sodium chloride flush, potassium chloride, magnesium sulfate, HYDROmorphone, ondansetron    ASSESSMENT and [facility] Day: 1     Abdominal pain with nausea s/p lap DaVinci robotic assisted ventral incision herniorrhaphy with repair followed by Bard dual mesh and significant lysis of omental adhesions supra pubically.  -Pain control  -IV abx proph.   -Antiemetics  -Dr. Annette Sidhu onboard    Asthma  -Albuterol    Obesity class 3 with BMI 53.6  -Educated about lifestyle modifications     Depression/Bipolar disorder/Anxiety  -Off of medications  -Therapy as outpatient        Diet DIET GENERAL;   DVT Prophylaxis [x] Lovenox, []  Heparin, [] SCDs, [] Ambulation   GI Prophylaxis [] PPI,  [] H2 Blocker,  [] Carafate,  [x] Diet/Tube Feeds   Code Status Full Code   Disposition Patient requires continued admission due to IVF and IV antibiotics   CMS Level of Risk [] Low, [x] Moderate,[]  High  Patient's risk as above due to post op monitoring       Thank you Dr. Carlos Allison for allowing us to participate in the care of your patient. Further recommendations will be made as the patient's hospital course progresses. If you have any questions about the medical care of this patient, please do not hesitate to contact us.     Electronically signed by MARU Acevedo CNP, on 1/9/2020 at 4:43 PM

## 2020-01-09 NOTE — H&P
HISTORY AND PHYSICAL EXAM    Date of Admission:  (Not on file)    PCP:  Louis Madrigal MD    Chief Complaint / History of Present Illness:  Elizabeth Edouard is a 36 y. o. female presenting with pain above the umbilicus and is chronic, worsening, dull and throbbing compared to patient's normal condition. It is severe in intensity for a duration of 3 months and is intermittent with modifying factors increased by or worse with heavy lifting and straining.     PMH:   has a past medical history of Anxiety disorder, Arthritis, Asthma, Bipolar disorder ([facility] [address] Depression, Drug addiction ([facility] [address] H/O echocardiogram (2018), Headache(784.0), History of exercise stress test (2017), Miscarriage, and Pain management. PSH:   has a past surgical history that includes Dilation and curettage of uterus; Tonsillectomy ();  section; Tooth Extraction; Tubal ligation (); Knee arthroscopy (Right, 2019); Carpal tunnel release (Left, 10/2019); and Carpal tunnel release (Right, ). Allergies: Allergies   Allergen Reactions    Latex Hives    Ultram [Tramadol Hcl] Hives        Home Meds:    Prior to Admission medications    Medication Sig Start Date End Date Taking? Authorizing Provider   Psyllium (METAMUCIL) 28.3 % POWD Take 862 g by mouth daily 10/23/19   Sulma Fishman MD   acetaminophen (APAP EXTRA STRENGTH) 500 MG tablet Take 1 tablet by mouth every 6 hours as needed for Pain 19   Rafy Sinha PA-C   albuterol sulfate  (90 Base) MCG/ACT inhaler Inhale 2 puffs into the lungs 4 times daily 19   Demar Cerrato MD   Elastic Bandages & Supports (KNEE BRACE/CUSHION/L-XL) MISC Please dispense one knee brace to be worn for 8 hrs during the day and taken off at nighttime for right knee for comfort.  18   Rafy Sinha PA-C   ibuprofen (ADVIL;MOTRIN) 600 MG tablet Take 1 tablet by mouth every 6 hours as needed for Pain or Fever 17   MARU Jolley - CNP   butalbital-acetaminophen-caffeine (FIORICET) -40 MG per tablet Take 1 tablet by mouth every 4 hours as needed for Headaches 2/19/17   MARU Dietz - CNP   gabapentin (NEURONTIN) 300 MG capsule Take 300 mg by mouth 3 times daily    Historical Provider, MD        [facility] Meds:    No current facility-administered medications for this encounter. Current Outpatient Medications   Medication Sig Dispense Refill    Psyllium (METAMUCIL) 28.3 % POWD Take 862 g by mouth daily 1 Bottle 5    acetaminophen (APAP EXTRA STRENGTH) 500 MG tablet Take 1 tablet by mouth every 6 hours as needed for Pain 20 tablet 0    albuterol sulfate  (90 Base) MCG/ACT inhaler Inhale 2 puffs into the lungs 4 times daily 1 Inhaler 5    Elastic Bandages & Supports (KNEE BRACE/CUSHION/L-XL) MISC Please dispense one knee brace to be worn for 8 hrs during the day and taken off at nighttime for right knee for comfort.  1 each 0    ibuprofen (ADVIL;MOTRIN) 600 MG tablet Take 1 tablet by mouth every 6 hours as needed for Pain or Fever 60 tablet 0    butalbital-acetaminophen-caffeine (FIORICET) -40 MG per tablet Take 1 tablet by mouth every 4 hours as needed for Headaches 10 tablet 0    gabapentin (NEURONTIN) 300 MG capsule Take 300 mg by mouth 3 times daily         Social History / Family History:        Social History     Socioeconomic History    Marital status: Single     Spouse name: None    Number of children: None    Years of education: None    Highest education level: None   Occupational History    None   Social Needs    Financial resource strain: None    Food insecurity:     Worry: None     Inability: None    Transportation needs:     Medical: None     Non-medical: None   Tobacco Use    Smoking status: Current Every Day Smoker     Packs/day: 1.00     Years: 18.00     Pack years: 18.00     Types: Cigarettes     Start date: 2001    Smokeless tobacco: Never Used   Substance and Sexual Activity    Alcohol use: No    Drug use: Not Currently     Types: Marijuana     Comment: Last used: 2005    Sexual activity: Yes     Partners: Male   Lifestyle    Physical activity:     Days per week: None     Minutes per session: None    Stress: None   Relationships    Social connections:     Talks on phone: None     Gets together: None     Attends Tenri[employer] service: None     Active member of club or organization: None     Attends meetings of clubs or organizations: None     Relationship status: None    Intimate partner violence:     Fear of current or ex partner: None     Emotionally abused: None     Physically abused: None     Forced sexual activity: None   Other Topics Concern    None   Social History Narrative    None      Family History   Problem Relation Age of Onset    Diabetes Mother     High Blood Pressure Mother     Heart Disease Mother     Heart Disease Maternal Grandmother     Heart Disease Maternal Grandfather     otherwise irrelevant to this surgical issue. Review of Systems:  Constitutional: Negative for fever, chills, diaphoresis, appetite change and fatigue. HENT: Negative for sore throat, trouble swallowing and voice change. Respiratory: Negative for cough, positive for shortness of breath no wheezing. Cardiovascular: Negative for chest pain positive for SOB with one flight of stairs exertion, no pitting LE edema. Gastrointestinal: Positive for abdominal pain. Negative for nausea, vomiting, diarrhea, constipation, blood in stool, abdominal distention, anal bleeding or rectal pain. Musculoskeletal: Negative for joint swelling and arthralgias. Skin: Warm and dry, well perfused. Neurological: Negative for seizures, syncope, speech difficulty and weakness. Hematological/Lymphatic: Negative for adenopathy. No history of DVT/PE. Does not bruise/bleed easily. Psychiatric/Behavioral: Negative for agitation. All others reviewed and negative. Physical Exam:  Vital Signs:  There were no vitals filed for this visit. Body mass index is 52.89 kg/m². eneral appearance: Pt Alert and oriented, to persons place and time, in no apparent acute distress. HEENT:  ELKIN, EOMI, Conjunctivae clear. No JVDs, Bruits, No thyroid-megaly. Lungs: No dyspnea. Clear to auscultation bilaterally. Heart: Regular rate and rhythm, S1, S2 normal, no murmur, rub or gallop. No legs edema. Abdomen: Soft. Bowel sounds are normal. She exhibits mass. She exhibits no distension. There is tenderness. There is no rebound and no guarding. A hernia (Supraumbiical ventral hernia) is present. Extremities: Warm, well perfused, no cyanosis or edema. Skin: Skin color, texture, turgor normal.  Neurologic: Grossly normal, Cranial nerves from II to XII intact. Deep tendon reflexes normal.  Psychology: Mood stable. Lymph nodes: No palpable LN in the neck, abdomen, or groins.        Radiologic / Imaging / TESTING       Labs:    No results found for this or any previous visit (from the past 24 hour(s)). Diagnosis:  Patient Active Problem List   Diagnosis    Pregnancy with poor obstetric history    Obesity complicating pregnancy in third trimester    Back pain in pregnancy    Chest pain    Morbid obesity with BMI of 50.0-59.9, adult ([facility] [address]    Mild intermittent asthma without complication    Shortness of breath on exertion    BASHIR (obstructive sleep apnea)    Drug-induced constipation    Diarrhea    Arthritis    Bilateral chronic knee pain    Pain of both hip joints    Chronic bilateral low back pain without sciatica    Episodic tension-type headache, not intractable    Nausea and vomiting in adult    Reactive depression    Anxiety    Panic attack    Secondary hypertension    GOGO (stress urinary incontinence, female)    Ventral hernia without obstruction or gangrene           Assessment & Plan:    1.  Ventral hernia without obstruction or gangrene          NEEDED to fix constipation, and that was

## 2020-01-10 VITALS
SYSTOLIC BLOOD PRESSURE: 120 MMHG | BODY MASS INDEX: 39.68 KG/M2 | HEIGHT: 72 IN | DIASTOLIC BLOOD PRESSURE: 61 MMHG | WEIGHT: 293 LBS | TEMPERATURE: 97.6 F | HEART RATE: 73 BPM | OXYGEN SATURATION: 97 % | RESPIRATION RATE: 16 BRPM

## 2020-01-10 LAB
ANION GAP SERPL CALCULATED.3IONS-SCNC: 9 MMOL/L (ref 4–16)
BASOPHILS ABSOLUTE: 0 K/CU MM
BASOPHILS RELATIVE PERCENT: 0.2 % (ref 0–1)
BUN BLDV-MCNC: 10 MG/DL (ref 6–23)
CALCIUM SERPL-MCNC: 9 MG/DL (ref 8.3–10.6)
CHLORIDE BLD-SCNC: 97 MMOL/L (ref 99–110)
CO2: 27 MMOL/L (ref 21–32)
CREAT SERPL-MCNC: 0.6 MG/DL (ref 0.6–1.1)
DIFFERENTIAL TYPE: ABNORMAL
EOSINOPHILS ABSOLUTE: 0.1 K/CU MM
EOSINOPHILS RELATIVE PERCENT: 0.6 % (ref 0–3)
GFR AFRICAN AMERICAN: >60 ML/MIN/1.73M2
GFR NON-AFRICAN AMERICAN: >60 ML/MIN/1.73M2
GLUCOSE BLD-MCNC: 241 MG/DL (ref 70–99)
HCT VFR BLD CALC: 40.7 % (ref 37–47)
HEMOGLOBIN: 12.8 GM/DL (ref 12.5–16)
IMMATURE NEUTROPHIL %: 0.5 % (ref 0–0.43)
LYMPHOCYTES ABSOLUTE: 1.8 K/CU MM
LYMPHOCYTES RELATIVE PERCENT: 14.9 % (ref 24–44)
MCH RBC QN AUTO: 28.8 PG (ref 27–31)
MCHC RBC AUTO-ENTMCNC: 31.4 % (ref 32–36)
MCV RBC AUTO: 91.7 FL (ref 78–100)
MONOCYTES ABSOLUTE: 0.9 K/CU MM
MONOCYTES RELATIVE PERCENT: 7.2 % (ref 0–4)
NUCLEATED RBC %: 0 %
PDW BLD-RTO: 12.5 % (ref 11.7–14.9)
PLATELET # BLD: 254 K/CU MM (ref 140–440)
PMV BLD AUTO: 9.9 FL (ref 7.5–11.1)
POTASSIUM SERPL-SCNC: 4.4 MMOL/L (ref 3.5–5.1)
RBC # BLD: 4.44 M/CU MM (ref 4.2–5.4)
SEGMENTED NEUTROPHILS ABSOLUTE COUNT: 9.3 K/CU MM
SEGMENTED NEUTROPHILS RELATIVE PERCENT: 76.6 % (ref 36–66)
SODIUM BLD-SCNC: 133 MMOL/L (ref 135–145)
TOTAL IMMATURE NEUTOROPHIL: 0.06 K/CU MM
TOTAL NUCLEATED RBC: 0 K/CU MM
WBC # BLD: 12.2 K/CU MM (ref 4–10.5)

## 2020-01-10 PROCEDURE — 2580000003 HC RX 258: Performed by: SURGERY

## 2020-01-10 PROCEDURE — 6370000000 HC RX 637 (ALT 250 FOR IP): Performed by: SURGERY

## 2020-01-10 PROCEDURE — G0378 HOSPITAL OBSERVATION PER HR: HCPCS

## 2020-01-10 PROCEDURE — 94761 N-INVAS EAR/PLS OXIMETRY MLT: CPT

## 2020-01-10 PROCEDURE — 94660 CPAP INITIATION&MGMT: CPT

## 2020-01-10 PROCEDURE — 6360000002 HC RX W HCPCS: Performed by: SURGERY

## 2020-01-10 PROCEDURE — 96372 THER/PROPH/DIAG INJ SC/IM: CPT

## 2020-01-10 PROCEDURE — 85025 COMPLETE CBC W/AUTO DIFF WBC: CPT

## 2020-01-10 PROCEDURE — 6370000000 HC RX 637 (ALT 250 FOR IP): Performed by: PHYSICIAN ASSISTANT

## 2020-01-10 PROCEDURE — 99218 PR INITIAL OBSERVATION CARE/DAY 30 MINUTES: CPT | Performed by: INTERNAL MEDICINE

## 2020-01-10 PROCEDURE — 96374 THER/PROPH/DIAG INJ IV PUSH: CPT

## 2020-01-10 PROCEDURE — 94640 AIRWAY INHALATION TREATMENT: CPT

## 2020-01-10 PROCEDURE — 80048 BASIC METABOLIC PNL TOTAL CA: CPT

## 2020-01-10 PROCEDURE — 99024 POSTOP FOLLOW-UP VISIT: CPT | Performed by: SURGERY

## 2020-01-10 PROCEDURE — 36415 COLL VENOUS BLD VENIPUNCTURE: CPT

## 2020-01-10 RX ADMIN — OXYCODONE HYDROCHLORIDE AND ACETAMINOPHEN 1 TABLET: 5; 325 TABLET ORAL at 03:53

## 2020-01-10 RX ADMIN — SODIUM CHLORIDE, PRESERVATIVE FREE 10 ML: 5 INJECTION INTRAVENOUS at 08:22

## 2020-01-10 RX ADMIN — OXYCODONE HYDROCHLORIDE AND ACETAMINOPHEN 1 TABLET: 5; 325 TABLET ORAL at 08:22

## 2020-01-10 RX ADMIN — Medication 1 PACKET: at 08:22

## 2020-01-10 RX ADMIN — ENOXAPARIN SODIUM 40 MG: 40 INJECTION SUBCUTANEOUS at 08:22

## 2020-01-10 RX ADMIN — ONDANSETRON 4 MG: 2 INJECTION INTRAMUSCULAR; INTRAVENOUS at 03:53

## 2020-01-10 RX ADMIN — DEXTROSE MONOHYDRATE 3 G: 50 INJECTION, SOLUTION INTRAVENOUS at 03:28

## 2020-01-10 RX ADMIN — GABAPENTIN 300 MG: 300 CAPSULE ORAL at 04:54

## 2020-01-10 RX ADMIN — IPRATROPIUM BROMIDE AND ALBUTEROL SULFATE 1 AMPULE: .5; 3 SOLUTION RESPIRATORY (INHALATION) at 08:31

## 2020-01-10 ASSESSMENT — PAIN DESCRIPTION - FREQUENCY: FREQUENCY: CONTINUOUS

## 2020-01-10 ASSESSMENT — PAIN DESCRIPTION - DIRECTION: RADIATING_TOWARDS: BACK

## 2020-01-10 ASSESSMENT — PAIN DESCRIPTION - LOCATION: LOCATION: ABDOMEN

## 2020-01-10 ASSESSMENT — PAIN SCALES - GENERAL
PAINLEVEL_OUTOF10: 10
PAINLEVEL_OUTOF10: 1
PAINLEVEL_OUTOF10: 10
PAINLEVEL_OUTOF10: 10

## 2020-01-10 ASSESSMENT — PAIN DESCRIPTION - ONSET: ONSET: ON-GOING

## 2020-01-10 ASSESSMENT — PAIN DESCRIPTION - ORIENTATION: ORIENTATION: RIGHT;LOWER

## 2020-01-10 ASSESSMENT — PAIN DESCRIPTION - PROGRESSION: CLINICAL_PROGRESSION: NOT CHANGED

## 2020-01-10 ASSESSMENT — PAIN - FUNCTIONAL ASSESSMENT: PAIN_FUNCTIONAL_ASSESSMENT: ACTIVITIES ARE NOT PREVENTED

## 2020-01-10 ASSESSMENT — PAIN DESCRIPTION - DESCRIPTORS: DESCRIPTORS: SHARP;SHOOTING

## 2020-01-10 ASSESSMENT — PAIN DESCRIPTION - PAIN TYPE: TYPE: SURGICAL PAIN

## 2020-01-10 NOTE — PLAN OF CARE
Problem: Pain:  Goal: Pain level will decrease  Description  Pain level will decrease  Outcome: Ongoing  Goal: Control of acute pain  Description  Control of acute pain  Outcome: Ongoing  Note:   Patient currently rates pain 10/10

## 2020-01-10 NOTE — CONSULTS
Pulmonary Consult Note      Reason for Consult: BASHIR requiring CPAP  Requesting Physician: Dr. Adalid English:   CHIEF COMPLAINT :Abdominal pain    Patient Active Problem List    Diagnosis Date Noted    Incarcerated ventral hernia 01/09/2020    Ventral hernia without obstruction or gangrene 10/23/2019    Secondary hypertension 01/05/2018    GOGO (stress urinary incontinence, female) 01/05/2018    Morbid obesity with BMI of 50.0-59.9, adult ([facility] [address] 04/27/2017    Mild intermittent asthma without complication 15/91/0797    Shortness of breath on exertion 04/27/2017    BASHIR (obstructive sleep apnea) 04/27/2017    Drug-induced constipation 04/27/2017    Diarrhea 04/27/2017    Arthritis 04/27/2017    Bilateral chronic knee pain 04/27/2017    Pain of both hip joints 04/27/2017    Chronic bilateral low back pain without sciatica 04/27/2017    Episodic tension-type headache, not intractable 04/27/2017    Nausea and vomiting in adult 04/27/2017    Reactive depression 04/27/2017    Anxiety 04/27/2017    Panic attack 04/27/2017    Chest pain 03/27/2017    Pregnancy with poor obstetric history 04/03/2012    Obesity complicating pregnancy in third trimester 04/03/2012    Back pain in pregnancy 04/03/2012        HPI:                The patient is a 36 y.o. female with significant past medical history of Asthma, Morbid Obesity, BASHIR non compliant with the CPAP, OHS,Arthritis, Bipolar disorder, drug abuse  presents with complaints of Abd pain x 1 day. She was found to have incarcerated ventral hernia for which she had the repair. I was called for the post op hypoxia. She has h/o BASHIR non compliant with CPAP for the last 1year. At this time she sleepy but arousable. She is in mild resp distress.      Past Medical History:      Diagnosis Date    Anxiety disorder     Arthritis     Asthma     Follows with Lizbeth    Bipolar disorder ([facility] [address]     Not on meds as of 12/3/19    Depression     currently not on meds High Blood Pressure Mother     Heart Disease Mother     Heart Disease Maternal Grandmother     Heart Disease Maternal Grandfather        REVIEW OF SYSTEMS:    CONSTITUTIONAL:  negative for fevers, chills, diaphoresis, activity change, appetite change, fatigue, night sweats and unexpected weight change. EYES:  negative for blurred vision, eye discharge, visual disturbance and icterus  HEENT:  negative for hearing loss, tinnitus, ear drainage, sinus pressure, nasal congestion, epistaxis and snoring  RESPIRATORY:  See HPI  CARDIOVASCULAR:  negative for chest pain, palpitations, exertional chest pressure/discomfort, edema, syncope  GASTROINTESTINAL:  negative for nausea, vomiting, diarrhea, constipation, blood in stool and abdominal pain  GENITOURINARY:  negative for frequency, dysuria, urinary incontinence, decreased urine volume, and hematuria  HEMATOLOGIC/LYMPHATIC:  negative for easy bruising, bleeding and lymphadenopathy  ALLERGIC/IMMUNOLOGIC:  negative for recurrent infections, angioedema, anaphylaxis and drug reactions  ENDOCRINE:  negative for weight changes and diabetic symptoms including polyuria, polydipsia and polyphagia  MUSCULOSKELETAL:  negative for  pain, joint swelling, decreased range of motion and muscle weakness  NEUROLOGICAL:  negative for headaches, slurred speech, unilateral weakness  PSYCHIATRIC/BEHAVIORAL: negative for hallucinations, behavioral problems, confusion and agitation.      Objective:   PHYSICAL EXAM:      VITALS:  BP (!) 132/115   Pulse 85   Temp 98.2 °F (36.8 °C) (Oral)   Resp 18   Ht 6' (1.829 m)   Wt (!) 390 lb (176.9 kg)   LMP 2019   SpO2 96%   BMI 52.89 kg/m²   24HR INTAKE/OUTPUT:      Intake/Output Summary (Last 24 hours) at 20200  Last data filed at 2020 1920  Gross per 24 hour   Intake 1230.83 ml   Output 435 ml   Net 795.83 ml     CURRENT PULSE OXIMETRY:  SpO2: 96 %  24HR PULSE OXIMETRY RANGE:  SpO2  Av %  Min: 89 %  Max: 100 found for: Jose Rafael Mckeona, [id number], PHART, THGBART, [id number], PO2ART, [id number]    Cultures:   Pending      Radiology Review:  Reviewed        Assessment/Plan       Patient Active Problem List    Diagnosis Date Noted    Incarcerated ventral hernia 01/09/2020    Ventral hernia without obstruction or gangrene 10/23/2019    Secondary hypertension 01/05/2018    GOGO (stress urinary incontinence, female) 01/05/2018    Morbid obesity with BMI of 50.0-59.9, adult ([facility] [address] 04/27/2017    Mild intermittent asthma without complication 95/65/4704    Shortness of breath on exertion 04/27/2017    BASHIR (obstructive sleep apnea) 04/27/2017    Drug-induced constipation 04/27/2017    Diarrhea 04/27/2017    Arthritis 04/27/2017    Bilateral chronic knee pain 04/27/2017    Pain of both hip joints 04/27/2017    Chronic bilateral low back pain without sciatica 04/27/2017    Episodic tension-type headache, not intractable 04/27/2017    Nausea and vomiting in adult 04/27/2017    Reactive depression 04/27/2017    Anxiety 04/27/2017    Panic attack 04/27/2017    Chest pain 03/27/2017    Pregnancy with poor obstetric history 04/03/2012    Obesity complicating pregnancy in third trimester 04/03/2012    Back pain in pregnancy 04/03/2012     Morbid obesity  BASHIR non complaint with CPAP  OHS  Mild systolic CHF   LVH  S/p Lap repair of Ventral hernia - incarcerated      PLAN  1. Pain control  2. ICS  3. CPT  4. BIPAP  5. PT/OT  6. OP Retitration study  7.  C/w present management    Electronically signed by Kaylie Gonzalez MD on 1/9/2020 at 10:20 PM

## 2020-01-10 NOTE — DISCHARGE SUMMARY
 Pain management     Was Dr. Pat Campbell - left his practice 11/2019       Discharge Diagnoses: same    Admission Condition: fair    Discharged Condition: good    Indication for Admission: Ventral hernia repair, and need for CPAP post op. Hospital Course: Patient had an uneventful hospital course after the above mentioned treatment, and was tolerating diet and ambulating without difficulty at the time of discharge. Consults: pulmonary/intensive care    Treatments:  IV hydration, antibiotics, analgesia, cardiac meds, anticoagulation, respiratory therapy / incentive spirometry,  and surgery: As above and please refer to daily progress notes for details.     Discharge Exam:  /65   Pulse 70   Temp 97.9 °F (36.6 °C) (Oral)   Resp 20   Ht 6' (1.829 m)   Wt (!) 390 lb (176.9 kg)   LMP 11/07/2019   SpO2 95%   BMI 52.89 kg/m²     General Appearance:    Alert, cooperative, no distress, appears stated age   Head:    Normocephalic, without obvious abnormality, atraumatic   Eyes:    PERRL, conjunctiva/corneas clear, EOM's intact, fundi     benign, both eyes   Ears:    Normal TM's and external ear canals, both ears   Nose:   Nares normal, septum midline, mucosa normal, no drainage    or sinus tenderness   Throat:   Lips, mucosa, and tongue normal; teeth and gums normal   Neck:   Supple, symmetrical, trachea midline, no adenopathy;     thyroid:  no enlargement/tenderness/nodules; no carotid    bruit or JVD   Back:     Symmetric, no curvature, ROM normal, no CVA tenderness   Lungs:     Clear to auscultation bilaterally, respirations unlabored   Chest Wall:    No tenderness or deformity    Heart:    Regular rate and rhythm, S1 and S2 normal, no murmur, rub   or gallop   Breast Exam:    No tenderness, masses, or nipple abnormality   Abdomen:     Soft, non-tender, bowel sounds active all four quadrants,     no masses, no organomegaly   Genitalia:    Normal female without lesion, discharge or tenderness   Rectal: Normal tone ;guaiac negative stool   Extremities:   Extremities normal, atraumatic, no cyanosis or edema   Pulses:   2+ and symmetric all extremities   Skin:   Skin color, texture, turgor normal, no rashes or lesions   Lymph nodes:   Cervical, supraclavicular, and axillary nodes normal   Neurologic:   CNII-XII intact, normal strength, sensation and reflexes     throughout       Discharge vitals: Wt Readings from Last 3 Encounters:   01/09/20 (!) 390 lb (176.9 kg)   12/03/19 (!) 401 lb 3.2 oz (182 kg)   10/23/19 (!) 404 lb 3.2 oz (183.3 kg)   ,  Temp Readings from Last 3 Encounters:   01/09/20 97.9 °F (36.6 °C) (Oral)   01/09/20 97.9 °F (36.6 °C)   12/10/19 97.4 °F (36.3 °C) (Temporal)   ,  BP Readings from Last 3 Encounters:   01/10/20 127/65   01/09/20 (!) 107/58   12/10/19 135/70   ,  Pulse Readings from Last 3 Encounters:   01/10/20 70   12/10/19 76   12/03/19 76        Disposition: home    Patient Instructions:   Current Discharge Medication List      START taking these medications    Details   oxyCODONE-acetaminophen (PERCOCET) 5-325 MG per tablet Take 1-2 tablets by mouth every 6 hours as needed for Pain for up to 7 days.   Qty: 35 tablet, Refills: 0    Comments: Reduce doses taken as pain becomes manageable  Associated Diagnoses: Ventral hernia without obstruction or gangrene      senna-docusate (SENOKOT S) 8.6-50 MG per tablet Take 2 tablets by mouth daily for 10 days  Qty: 60 tablet, Refills: 3         CONTINUE these medications which have NOT CHANGED    Details   Psyllium (METAMUCIL) 28.3 % POWD Take 862 g by mouth daily  Qty: 1 Bottle, Refills: 5      acetaminophen (APAP EXTRA STRENGTH) 500 MG tablet Take 1 tablet by mouth every 6 hours as needed for Pain  Qty: 20 tablet, Refills: 0      albuterol sulfate  (90 Base) MCG/ACT inhaler Inhale 2 puffs into the lungs 4 times daily  Qty: 1 Inhaler, Refills: 5      Elastic Bandages & Supports (KNEE BRACE/CUSHION/L-XL) MISC Please dispense one knee brace to be worn for 8 hrs during the day and taken off at nighttime for right knee for comfort. Qty: 1 each, Refills: 0      ibuprofen (ADVIL;MOTRIN) 600 MG tablet Take 1 tablet by mouth every 6 hours as needed for Pain or Fever  Qty: 60 tablet, Refills: 0      gabapentin (NEURONTIN) 300 MG capsule Take 300 mg by mouth 3 times daily         STOP taking these medications       butalbital-acetaminophen-caffeine (FIORICET) -40 MG per tablet Comments:   Reason for Stopping:             Activity: no lifting, or Strenuous exercise for 3 wks.   Diet: regular diet  Wound Care: keep wound clean and dry    Call  [phone]  to make a follow-up appointment  with Dr Herberth Galloway MD, [facility] in 1 week.    ____________________________________________    Signed:      Herberth Galloway MD, FACS, FICS    1/10/2020  7:00 AM

## 2020-01-10 NOTE — PROGRESS NOTES
GENERAL SURGERY PROGRESS NOTE    CC/HPI:           Patient feels better from yesterday's surgery. Vitals:    01/09/20 1950 01/09/20 2333 01/09/20 2337 01/10/20 0300   BP: 136/86 (!) 144/77 (!) 144/77 127/65   Pulse: 86 87  70   Resp: 18 16  20   Temp: 98 °F (36.7 °C) 97.9 °F (36.6 °C)     TempSrc: Oral Oral     SpO2:  95% 95%    Weight:       Height:         I/O last 3 completed shifts: In: 1230.8 [I.V.:1130.8; IV Piggyback:100]  Out: 435 [Urine:425; Blood:10]  No intake/output data recorded. DIET GENERAL;    Recent Results (from the past 48 hour(s))   Urine Drug Screen    Collection Time: 01/09/20  8:09 AM   Result Value Ref Range    Cannabinoid Scrn, Ur NEGATIVE NEGATIVE    Amphetamines NEGATIVE NEGATIVE    Cocaine Metabolite NEGATIVE NEGATIVE    Benzodiazepine Screen, Urine NEGATIVE NEGATIVE    Barbiturate Screen, Ur NEGATIVE NEGATIVE    Opiates, Urine NEGATIVE NEGATIVE    Phencyclidine, Urine NEGATIVE NEGATIVE    Oxycodone  NEGATIVE     NEGATIVE          THRESHOLD CONCENTRATIONS (mg/dL)  AMPHT               1000  RACHEL,OPIA             300  BZO,BAR              200  PCP                   25  THC                   50  OXY                  100          IF POSITIVE, SPECIMEN WILL BE  DISCARDED AFTER 6 MONTHS. CALL LAB IF CONFIRMATION NEEDED. ALL NEGATIVE SPECIMENS WILL BE  DISCARDED AFTER ONE WEEK. * UNCONFIRMED POSITIVES MAY  NOT MEET FORENSIC REQUIREMENTS.              Basic Metabolic Panel    Collection Time: 01/09/20  9:00 AM   Result Value Ref Range    Sodium 137 135 - 145 MMOL/L    Potassium 4.6 3.5 - 5.1 MMOL/L    Chloride 101 99 - 110 mMol/L    CO2 26 21 - 32 MMOL/L    Anion Gap 10 4 - 16    BUN 12 6 - 23 MG/DL    CREATININE 0.6 0.6 - 1.1 MG/DL    Glucose 104 (H) 70 - 99 MG/DL    Calcium 9.3 8.3 - 10.6 MG/DL    GFR Non-African American >60 >60 mL/min/1.73m2    GFR African American >60 >60 mL/min/1.73m2   CBC    Collection Time: 01/09/20  9:00 AM   Result Value Ref Range    WBC 8.0 4.0 - SC Lovenox. Continue GI prophylaxis with Protonix IV till able to tolerate PO. Continue chu-op Abx for 24 hrs after surgery then dc.    ___________________________________________    Michaela Martinez MD, FACS, FICS  1/10/2020  6:57 AM    Patient was seen with total face to face time of 25 minutes. More than 50% of this visit was counseling and education as above in my assessment and plan section of my note.

## 2020-01-10 NOTE — CARE COORDINATION
Reviewed chart and spoke with pt about discharge needs/plans. Pt lives alone with her children, PTA she was totally independent, her mother assists her with transportation. Pt has a PCP and insurance that covers medications. Denies any needs at this time. Patients white board updated and  card provided to pt/family.

## 2020-01-12 ENCOUNTER — HOSPITAL ENCOUNTER (EMERGENCY)
Age: 35
Discharge: HOME | End: 2020-01-13
Attending: EMERGENCY MEDICINE
Payer: COMMERCIAL

## 2020-01-12 PROCEDURE — 80053 COMPREHEN METABOLIC PANEL: CPT

## 2020-01-12 PROCEDURE — 85025 COMPLETE CBC W/AUTO DIFF WBC: CPT

## 2020-01-12 PROCEDURE — 99284 EMERGENCY DEPT VISIT MOD MDM: CPT

## 2020-01-12 RX ORDER — KETOROLAC TROMETHAMINE 30 MG/ML
30 INJECTION, SOLUTION INTRAMUSCULAR; INTRAVENOUS ONCE
Status: COMPLETED | OUTPATIENT
Start: 2020-01-12 | End: 2020-01-13

## 2020-01-12 ASSESSMENT — PAIN DESCRIPTION - LOCATION: LOCATION: ABDOMEN

## 2020-01-12 ASSESSMENT — PAIN DESCRIPTION - DESCRIPTORS: DESCRIPTORS: CONSTANT;RADIATING

## 2020-01-12 ASSESSMENT — PAIN DESCRIPTION - PAIN TYPE: TYPE: ACUTE PAIN

## 2020-01-12 ASSESSMENT — PAIN SCALES - GENERAL: PAINLEVEL_OUTOF10: 7

## 2020-01-12 ASSESSMENT — PAIN DESCRIPTION - FREQUENCY: FREQUENCY: CONTINUOUS

## 2020-01-13 ENCOUNTER — APPOINTMENT (OUTPATIENT)
Dept: CT IMAGING | Age: 35
End: 2020-01-13
Payer: COMMERCIAL

## 2020-01-13 VITALS
BODY MASS INDEX: 39.68 KG/M2 | RESPIRATION RATE: 16 BRPM | DIASTOLIC BLOOD PRESSURE: 61 MMHG | HEIGHT: 72 IN | OXYGEN SATURATION: 98 % | SYSTOLIC BLOOD PRESSURE: 128 MMHG | WEIGHT: 293 LBS | HEART RATE: 70 BPM | TEMPERATURE: 98.2 F

## 2020-01-13 LAB
ALBUMIN SERPL-MCNC: 3.5 GM/DL (ref 3.4–5)
ALP BLD-CCNC: 62 IU/L (ref 40–129)
ALT SERPL-CCNC: 17 U/L (ref 10–40)
ANION GAP SERPL CALCULATED.3IONS-SCNC: 12 MMOL/L (ref 4–16)
AST SERPL-CCNC: 18 IU/L (ref 15–37)
BACTERIA: NEGATIVE /HPF
BASOPHILS ABSOLUTE: 0 K/CU MM
BASOPHILS RELATIVE PERCENT: 0.4 % (ref 0–1)
BILIRUB SERPL-MCNC: 0.3 MG/DL (ref 0–1)
BILIRUBIN URINE: NEGATIVE MG/DL
BLOOD, URINE: NEGATIVE
BUN BLDV-MCNC: 10 MG/DL (ref 6–23)
CALCIUM SERPL-MCNC: 9 MG/DL (ref 8.3–10.6)
CHLORIDE BLD-SCNC: 100 MMOL/L (ref 99–110)
CLARITY: CLEAR
CO2: 25 MMOL/L (ref 21–32)
COLOR: YELLOW
CREAT SERPL-MCNC: 0.6 MG/DL (ref 0.6–1.1)
DIFFERENTIAL TYPE: ABNORMAL
EOSINOPHILS ABSOLUTE: 0.4 K/CU MM
EOSINOPHILS RELATIVE PERCENT: 3.8 % (ref 0–3)
GFR AFRICAN AMERICAN: >60 ML/MIN/1.73M2
GFR NON-AFRICAN AMERICAN: >60 ML/MIN/1.73M2
GLUCOSE BLD-MCNC: 114 MG/DL (ref 70–99)
GLUCOSE, URINE: NEGATIVE MG/DL
HCT VFR BLD CALC: 40.4 % (ref 37–47)
HEMOGLOBIN: 12.6 GM/DL (ref 12.5–16)
IMMATURE NEUTROPHIL %: 0.3 % (ref 0–0.43)
INTERPRETATION: NORMAL
KETONES, URINE: NEGATIVE MG/DL
LEUKOCYTE ESTERASE, URINE: NEGATIVE
LYMPHOCYTES ABSOLUTE: 2.3 K/CU MM
LYMPHOCYTES RELATIVE PERCENT: 24.6 % (ref 24–44)
MCH RBC QN AUTO: 29.3 PG (ref 27–31)
MCHC RBC AUTO-ENTMCNC: 31.2 % (ref 32–36)
MCV RBC AUTO: 94 FL (ref 78–100)
MONOCYTES ABSOLUTE: 0.5 K/CU MM
MONOCYTES RELATIVE PERCENT: 5.1 % (ref 0–4)
MUCUS: ABNORMAL HPF
NITRITE URINE, QUANTITATIVE: NEGATIVE
NUCLEATED RBC %: 0 %
PDW BLD-RTO: 12.5 % (ref 11.7–14.9)
PH, URINE: 8 (ref 5–8)
PLATELET # BLD: 241 K/CU MM (ref 140–440)
PMV BLD AUTO: 9.7 FL (ref 7.5–11.1)
POTASSIUM SERPL-SCNC: 3.8 MMOL/L (ref 3.5–5.1)
PREGNANCY, URINE: NEGATIVE
PROTEIN UA: NEGATIVE MG/DL
RBC # BLD: 4.3 M/CU MM (ref 4.2–5.4)
RBC URINE: ABNORMAL /HPF (ref 0–6)
SEGMENTED NEUTROPHILS ABSOLUTE COUNT: 6.1 K/CU MM
SEGMENTED NEUTROPHILS RELATIVE PERCENT: 65.8 % (ref 36–66)
SODIUM BLD-SCNC: 137 MMOL/L (ref 135–145)
SPECIFIC GRAVITY UA: 1.02 (ref 1–1.03)
SPECIFIC GRAVITY, URINE: 1.02 (ref 1–1.03)
SQUAMOUS EPITHELIAL: 2 /HPF
TOTAL IMMATURE NEUTOROPHIL: 0.03 K/CU MM
TOTAL NUCLEATED RBC: 0 K/CU MM
TOTAL PROTEIN: 6.7 GM/DL (ref 6.4–8.2)
TRICHOMONAS: ABNORMAL /HPF
UROBILINOGEN, URINE: NORMAL MG/DL (ref 0.2–1)
WBC # BLD: 9.2 K/CU MM (ref 4–10.5)
WBC UA: 1 /HPF (ref 0–5)

## 2020-01-13 PROCEDURE — 74177 CT ABD & PELVIS W/CONTRAST: CPT

## 2020-01-13 PROCEDURE — 96375 TX/PRO/DX INJ NEW DRUG ADDON: CPT

## 2020-01-13 PROCEDURE — 2580000003 HC RX 258: Performed by: EMERGENCY MEDICINE

## 2020-01-13 PROCEDURE — 96374 THER/PROPH/DIAG INJ IV PUSH: CPT

## 2020-01-13 PROCEDURE — 6370000000 HC RX 637 (ALT 250 FOR IP): Performed by: EMERGENCY MEDICINE

## 2020-01-13 PROCEDURE — 81025 URINE PREGNANCY TEST: CPT

## 2020-01-13 PROCEDURE — 6360000002 HC RX W HCPCS: Performed by: EMERGENCY MEDICINE

## 2020-01-13 PROCEDURE — 81001 URINALYSIS AUTO W/SCOPE: CPT

## 2020-01-13 PROCEDURE — 6360000004 HC RX CONTRAST MEDICATION: Performed by: EMERGENCY MEDICINE

## 2020-01-13 RX ORDER — DOXYCYCLINE HYCLATE 100 MG
100 TABLET ORAL ONCE
Status: COMPLETED | OUTPATIENT
Start: 2020-01-13 | End: 2020-01-13

## 2020-01-13 RX ORDER — ONDANSETRON 2 MG/ML
INJECTION INTRAMUSCULAR; INTRAVENOUS
Status: DISCONTINUED
Start: 2020-01-13 | End: 2020-01-13 | Stop reason: HOSPADM

## 2020-01-13 RX ORDER — ONDANSETRON 2 MG/ML
4 INJECTION INTRAMUSCULAR; INTRAVENOUS EVERY 30 MIN PRN
Status: DISCONTINUED | OUTPATIENT
Start: 2020-01-13 | End: 2020-01-13 | Stop reason: HOSPADM

## 2020-01-13 RX ORDER — SODIUM CHLORIDE 0.9 % (FLUSH) 0.9 %
10 SYRINGE (ML) INJECTION 2 TIMES DAILY
Status: DISCONTINUED | OUTPATIENT
Start: 2020-01-13 | End: 2020-01-13 | Stop reason: HOSPADM

## 2020-01-13 RX ORDER — DOXYCYCLINE 100 MG/1
100 TABLET ORAL 2 TIMES DAILY
Qty: 14 TABLET | Refills: 0 | Status: SHIPPED | OUTPATIENT
Start: 2020-01-13 | End: 2020-01-20

## 2020-01-13 RX ADMIN — Medication 10 ML: at 01:28

## 2020-01-13 RX ADMIN — KETOROLAC TROMETHAMINE 30 MG: 30 INJECTION, SOLUTION INTRAMUSCULAR; INTRAVENOUS at 01:23

## 2020-01-13 RX ADMIN — DOXYCYCLINE HYCLATE 100 MG: 100 TABLET, COATED ORAL at 03:16

## 2020-01-13 RX ADMIN — IOPAMIDOL 80 ML: 755 INJECTION, SOLUTION INTRAVENOUS at 01:27

## 2020-01-13 RX ADMIN — ONDANSETRON 4 MG: 2 INJECTION INTRAMUSCULAR; INTRAVENOUS at 01:23

## 2020-01-13 ASSESSMENT — PAIN SCALES - GENERAL
PAINLEVEL_OUTOF10: 7
PAINLEVEL_OUTOF10: 4

## 2020-01-13 NOTE — ED NOTES
Impression   Interval postsurgical changes from ventral abdominal hernia repair, including   subcutaneous gas and soft tissue stranding.  No focal fluid collection.       Few foci of gas along the ventral abdominal wall in the left lower quadrant   is also likely postoperative.       Unchanged hepatosplenomegaly and hepatic steatosis.         Marco Antonio Carter RN  01/13/20 [id number]

## 2020-01-13 NOTE — ED NOTES
Pt resting in a position of comfort. No needs identified at this time. Bed in lowest position and call light within reach. Respirations even, no distress noted.      Inés Casas RN  01/13/20 3391

## 2020-01-13 NOTE — ED PROVIDER NOTES
Intimate partner violence:     Fear of current or ex partner: Not on file     Emotionally abused: Not on file     Physically abused: Not on file     Forced sexual activity: Not on file   Other Topics Concern    Not on file   Social History Narrative    Not on file     Current Facility-Administered Medications   Medication Dose Route Frequency Provider Last Rate Last Dose    sodium chloride flush 0.9 % injection 10 mL  10 mL Intravenous BID Tammy Monsivais DO   10 mL at 01/13/20 0128    ondansetron (ZOFRAN) injection 4 mg  4 mg Intravenous Q30 Min PRN Tammy Monsivais DO   4 mg at 01/13/20 0123     Current Outpatient Medications   Medication Sig Dispense Refill    doxycycline monohydrate (ADOXA) 100 MG tablet Take 1 tablet by mouth 2 times daily for 7 days 14 tablet 0    oxyCODONE-acetaminophen (PERCOCET) 5-325 MG per tablet Take 1-2 tablets by mouth every 6 hours as needed for Pain for up to 7 days. 35 tablet 0    senna-docusate (SENOKOT S) 8.6-50 MG per tablet Take 2 tablets by mouth daily for 10 days 60 tablet 3    Psyllium (METAMUCIL) 28.3 % POWD Take 862 g by mouth daily 1 Bottle 5    acetaminophen (APAP EXTRA STRENGTH) 500 MG tablet Take 1 tablet by mouth every 6 hours as needed for Pain 20 tablet 0    albuterol sulfate  (90 Base) MCG/ACT inhaler Inhale 2 puffs into the lungs 4 times daily 1 Inhaler 5    Elastic Bandages & Supports (KNEE BRACE/CUSHION/L-XL) MISC Please dispense one knee brace to be worn for 8 hrs during the day and taken off at nighttime for right knee for comfort.  1 each 0    ibuprofen (ADVIL;MOTRIN) 600 MG tablet Take 1 tablet by mouth every 6 hours as needed for Pain or Fever 60 tablet 0    gabapentin (NEURONTIN) 300 MG capsule Take 300 mg by mouth 3 times daily       Allergies   Allergen Reactions    Latex Hives    Ultram [Tramadol Hcl] Hives       Nursing Notes Reviewed    Physical Exam:  ED Triage Vitals [01/12/20 7190]   Enc Vitals Group      /64      Pulse 90 Resp 20      Temp 98.2 °F (36.8 °C)      Temp Source Oral      SpO2 100 %      Weight (!) 390 lb (176.9 kg)      Height 6' (1.829 m)      Head Circumference       Peak Flow       Pain Score       Pain Loc       Pain Edu? Excl. in [address]? GENERAL APPEARANCE: Awake and alert. Cooperative. No acute distress. Body mass index is 52.89 kg/m². HEAD: Normocephalic. Atraumatic. EYES: EOM's grossly intact. Sclera anicteric. ENT: Tolerates saliva. No trismus. NECK: Supple. Trachea midline. CARDIO: RRR. Radial pulse 2+. LUNGS: Respirations unlabored. CTAB. ABDOMEN: Soft. Non-distended. Generally tender, primarily in the left lower abdomen. The incisions are clean and dry without induration or purulent discharge. All but the one in the left lower abdomen are intact. This one is very minimally dehisced. There is approximately a palm-sized area of erythema. No rebound or guarding. No CVA tenderness. EXTREMITIES: No acute deformities. No LE tenderness/edema/asymmetry. SKIN: Warm and dry. NEUROLOGICAL: No gross facial drooping. Moves all 4 extremities spontaneously. PSYCHIATRIC: Normal mood.      Labs:  Results for orders placed or performed during the hospital encounter of 01/12/20   Urinalysis   Result Value Ref Range    Color, UA YELLOW YELLOW    Clarity, UA CLEAR CLEAR    Glucose, Urine NEGATIVE NEGATIVE MG/DL    Bilirubin Urine NEGATIVE NEGATIVE MG/DL    Ketones, Urine NEGATIVE NEGATIVE MG/DL    Specific Gravity, UA 1.019 1.001 - 1.035    Blood, Urine NEGATIVE NEGATIVE    pH, Urine 8.0 5.0 - 8.0    Protein, UA NEGATIVE NEGATIVE MG/DL    Urobilinogen, Urine NORMAL 0.2 - 1.0 MG/DL    Nitrite Urine, Quantitative NEGATIVE NEGATIVE    Leukocyte Esterase, Urine NEGATIVE NEGATIVE    RBC, UA NONE SEEN 0 - 6 /HPF    WBC, UA 1 0 - 5 /HPF    Bacteria, UA NEGATIVE NEGATIVE /HPF    Squam Epithel, UA 2 /HPF    Mucus, UA RARE (A) NEGATIVE HPF    Trichomonas, UA NONE SEEN NONE SEEN /HPF   Urine HCG, if >12yrs and premenopausal   Result Value Ref Range    Pregnancy, Urine NEGATIVE NEGATIVE    Specific Gravity, Urine 1.019 1.001 - 1.035    Interpretation       HCG METHOD LIMITATIONS:  Very dilute specimens, as indicated  by low specific gravity, may have  insufficient concentration of HCG  to bring about a positive result.             CBC Auto Differential   Result Value Ref Range    WBC 9.2 4.0 - 10.5 K/CU MM    RBC 4.30 4.2 - 5.4 M/CU MM    Hemoglobin 12.6 12.5 - 16.0 GM/DL    Hematocrit 40.4 37 - 47 %    MCV 94.0 78 - 100 FL    MCH 29.3 27 - 31 PG    MCHC 31.2 (L) 32.0 - 36.0 %    RDW 12.5 11.7 - 14.9 %    Platelets 688 152 - 829 K/CU MM    MPV 9.7 7.5 - 11.1 FL    Differential Type AUTOMATED DIFFERENTIAL     Segs Relative 65.8 36 - 66 %    Lymphocytes % 24.6 24 - 44 %    Monocytes % 5.1 (H) 0 - 4 %    Eosinophils % 3.8 (H) 0 - 3 %    Basophils % 0.4 0 - 1 %    Segs Absolute 6.1 K/CU MM    Lymphocytes Absolute 2.3 K/CU MM    Monocytes Absolute 0.5 K/CU MM    Eosinophils Absolute 0.4 K/CU MM    Basophils Absolute 0.0 K/CU MM    Nucleated RBC % 0.0 %    Total Nucleated RBC 0.0 K/CU MM    Total Immature Neutrophil 0.03 K/CU MM    Immature Neutrophil % 0.3 0 - 0.43 %   Comprehensive Metabolic Panel w/ Reflex to MG   Result Value Ref Range    Sodium 137 135 - 145 MMOL/L    Potassium 3.8 3.5 - 5.1 MMOL/L    Chloride 100 99 - 110 mMol/L    CO2 25 21 - 32 MMOL/L    BUN 10 6 - 23 MG/DL    CREATININE 0.6 0.6 - 1.1 MG/DL    Glucose 114 (H) 70 - 99 MG/DL    Calcium 9.0 8.3 - 10.6 MG/DL    Alb 3.5 3.4 - 5.0 GM/DL    Total Protein 6.7 6.4 - 8.2 GM/DL    Total Bilirubin 0.3 0.0 - 1.0 MG/DL    ALT 17 10 - 40 U/L    AST 18 15 - 37 IU/L    Alkaline Phosphatase 62 40 - 129 IU/L    GFR Non-African American >60 >60 mL/min/1.73m2    GFR African American >60 >60 mL/min/1.73m2    Anion Gap 12 4 - 16       Radiographs (if obtained):  [] The following radiograph was interpreted by myself in the absence of a radiologist:  [x] Radiologist's Report reviewed at time of ED visit:  Ct Abdomen Pelvis W Iv Contrast    Result Date: 1/13/2020  Interval postsurgical changes from ventral abdominal hernia repair, including subcutaneous gas and soft tissue stranding. No focal fluid collection. Few foci of gas along the ventral abdominal wall in the left lower quadrant is also likely postoperative. Unchanged hepatosplenomegaly and hepatic steatosis. Xr Chest Portable    Result Date: 1/9/2020  EXAMINATION: ONE XRAY VIEW OF THE CHEST 1/9/2020 1:03 pm COMPARISON: December 16, 2017 HISTORY: ORDERING SYSTEM PROVIDED HISTORY: post op TECHNOLOGIST PROVIDED HISTORY: Reason for exam:->post op Reason for Exam: post op Acuity: Acute Type of Exam: Initial FINDINGS: Stable cardiomediastinal silhouette. The lung volumes are noted. There is no focal consolidation, pleural effusion, or pneumothorax. The osseous structures are stable. No acute process. ED Course and MDM:  patient is given Toradol and is comfortable on repeat assessment. Labs reviewed and are reassuring. Urinalysis without convincing evidence of infection. CT obtained as patient's exam is somewhat limited due to her body habitus. She has some expected postoperative changes but no other acute findings. Patient does have an area of erythema around the left lower incision and is placed on antibiotics just as a precaution. She otherwise is well-appearing and comfortable. I think patient is appropriate for outpatient management. Patient is given instructions regarding symptomatic care at home as well as return precautions. To follow up with Dr. Kasandra Jerome for recheck in 2-3 days. I have notified Dr. Soco Thomson, on call, of patient's case and the concerns tonight. Patient verbalizes understanding of all instructions and is comfortable with the plan of care. Final Impression:  1. Post-operative pain      DISPOSITION Decision To Discharge 01/13/2020 02:42:14 AM      Patient referred to:   Sulma Fishman MD  [address] [facility]  Bariatric office  [address]  [phone]    Schedule an appointment as soon as possible for a visit in 2 days      Pedro Burciaga MD  [address] [facility] [id number] [phone]          [facility] Emergency Department  [address] [place] [id number] [phone]    If symptoms worsen    Discharge medications:  New Prescriptions    DOXYCYCLINE MONOHYDRATE (ADOXA) 100 MG TABLET    Take 1 tablet by mouth 2 times daily for 7 days     (Please note that portions of this note may have been completed with a voice recognition program. Efforts were made to edit the dictations but occasionally words are mis-transcribed.)    Lázaro Jaquez, [address], DO  01/13/20 8818

## 2020-01-14 ENCOUNTER — OFFICE VISIT (OUTPATIENT)
Dept: BARIATRICS/WEIGHT MGMT | Age: 35
End: 2020-01-14

## 2020-01-14 VITALS
HEART RATE: 74 BPM | DIASTOLIC BLOOD PRESSURE: 66 MMHG | SYSTOLIC BLOOD PRESSURE: 124 MMHG | WEIGHT: 293 LBS | HEIGHT: 72 IN | BODY MASS INDEX: 39.68 KG/M2

## 2020-01-14 PROCEDURE — 99024 POSTOP FOLLOW-UP VISIT: CPT | Performed by: SURGERY

## 2020-01-14 RX ORDER — ONDANSETRON 4 MG/1
4 TABLET, FILM COATED ORAL DAILY PRN
Qty: 30 TABLET | Refills: 0 | Status: SHIPPED
Start: 2020-01-14 | End: 2020-10-14 | Stop reason: CLARIF

## 2020-01-14 RX ORDER — IBUPROFEN 800 MG/1
400 TABLET ORAL EVERY 8 HOURS PRN
Qty: 42 TABLET | Refills: 0 | Status: SHIPPED | OUTPATIENT
Start: 2020-01-14 | End: 2020-10-01

## 2020-01-14 NOTE — PROGRESS NOTES
Post-Operative Clinic Note    Chief Complaint   Patient presents with    Post-Op Check     1st P/O Dustin Supra/Ventral Hernia Repair W/Mesh @ [facility] on 20 (Dr. Haleigh Collins PT) C/O Incision Opening Up and Getting Infected, Started ABX          SUBJECTIVE:  Patient is here today for a post-operative visit. Patient is s/p robotic supra umbilical / ventral hernia repair on 2020 with Dr. Nori Vargas.     Pt was seen in ED two days ago bc she was concerned that incision was opening and getting infected. Denies F/C. Tolerating PO. Some nausea. +BM. +Ambulating. States in ED she had blood work, imaging, and was d/c'd on ABx. Past Surgical History:   Procedure Laterality Date    CARPAL TUNNEL RELEASE Left 10/2019    Dr. Asa Gonzalez Right 2016     SECTION       &     DILATION AND CURETTAGE OF UTERUS       &     HERNIA REPAIR N/A 2020    HERNIA SUPRAUMBILICAL/ VENTRAL REPAIR LAPAROSCOPIC ROBOTIC WITH MESH performed by Hazel Campa MD at [address] [facility] ARTHROSCOPY Right 2019    Dr. Tani Harris      Several on bottom & top - no partials    TUBAL LIGATION       Past Medical History:   Diagnosis Date    Anxiety disorder     Arthritis     Asthma     Follows with Lizbeth    Bipolar disorder ([facility] [address]     Not on meds as of 12/3/19    Depression     currently not on meds (12/3/19)    Drug addiction ([facility] [address]     states previous addiction to vicodin (), denies current use. Hx + UDS for cocaine, 12/3/2019    H/O echocardiogram 2018    Technically difficult examination due to body habitus. Left ventricular function is low normal, EF is estimated at 45-50%. Mild concentric left ventricular hypertrophy. Mildly dilated left atrium. Right ventricular systolic pressure of 29 mm Hg. Mild tricuspid regurgitation, no evidence of any pericardial effusion.      AKIMCDTT(170.0)     Last headache:  19    History of exercise stress test 05/04/2017    treadmill    Miscarriage     x4    Pain management     Was Dr. Akbar President - left his practice 11/2019     Family History   Problem Relation Age of Onset    Diabetes Mother     High Blood Pressure Mother     Heart Disease Mother     Heart Disease Maternal Grandmother     Heart Disease Maternal Grandfather      Social History     Socioeconomic History    Marital status: Single     Spouse name: Not on file    Number of children: Not on file    Years of education: Not on file    Highest education level: Not on file   Occupational History    Not on file   Social Needs    Financial resource strain: Not on file    Food insecurity:     Worry: Not on file     Inability: Not on file    Transportation needs:     Medical: Not on file     Non-medical: Not on file   Tobacco Use    Smoking status: Current Every Day Smoker     Packs/day: 1.00     Years: 18.00     Pack years: 18.00     Types: Cigarettes     Start date: 2001    Smokeless tobacco: Never Used   Substance and Sexual Activity    Alcohol use: No    Drug use: Not Currently     Types: Marijuana     Comment: Last used: 2005    Sexual activity: Yes     Partners: Male   Lifestyle    Physical activity:     Days per week: Not on file     Minutes per session: Not on file    Stress: Not on file   Relationships    Social connections:     Talks on phone: Not on file     Gets together: Not on file     Attends Episcopal service: Not on file     Active member of club or organization: Not on file     Attends meetings of clubs or organizations: Not on file     Relationship status: Not on file    Intimate partner violence:     Fear of current or ex partner: Not on file     Emotionally abused: Not on file     Physically abused: Not on file     Forced sexual activity: Not on file   Other Topics Concern    Not on file   Social History Narrative    Not on file       OBJECTIVE:  Physical Exam  A&Ox3, NAD at rest.  AT. NC.   Breathing

## 2020-01-22 ENCOUNTER — OFFICE VISIT (OUTPATIENT)
Dept: BARIATRICS/WEIGHT MGMT | Age: 35
End: 2020-01-22

## 2020-01-22 VITALS
WEIGHT: 293 LBS | BODY MASS INDEX: 39.68 KG/M2 | DIASTOLIC BLOOD PRESSURE: 60 MMHG | HEIGHT: 72 IN | SYSTOLIC BLOOD PRESSURE: 126 MMHG | HEART RATE: 97 BPM

## 2020-01-22 PROBLEM — Z87.19 S/P LAPAROSCOPIC HERNIA REPAIR: Status: ACTIVE | Noted: 2020-01-22

## 2020-01-22 PROBLEM — Z98.890 S/P LAPAROSCOPIC HERNIA REPAIR: Status: ACTIVE | Noted: 2020-01-22

## 2020-01-22 PROCEDURE — 99024 POSTOP FOLLOW-UP VISIT: CPT | Performed by: SURGERY

## 2020-01-22 RX ORDER — DOXYCYCLINE HYCLATE 100 MG/1
CAPSULE ORAL
COMMUNITY
Start: 2020-01-16 | End: 2020-10-01

## 2020-01-22 RX ORDER — PREDNISONE 20 MG/1
TABLET ORAL
COMMUNITY
Start: 2020-01-16 | End: 2020-10-01

## 2020-02-09 ENCOUNTER — HOSPITAL ENCOUNTER (EMERGENCY)
Age: 35
Discharge: HOME | End: 2020-02-09
Payer: COMMERCIAL

## 2020-02-09 ENCOUNTER — APPOINTMENT (OUTPATIENT)
Dept: GENERAL RADIOLOGY | Age: 35
End: 2020-02-09
Payer: COMMERCIAL

## 2020-02-09 VITALS
HEIGHT: 72 IN | WEIGHT: 293 LBS | DIASTOLIC BLOOD PRESSURE: 74 MMHG | BODY MASS INDEX: 39.68 KG/M2 | HEART RATE: 86 BPM | OXYGEN SATURATION: 98 % | SYSTOLIC BLOOD PRESSURE: 116 MMHG | TEMPERATURE: 98.2 F | RESPIRATION RATE: 16 BRPM

## 2020-02-09 LAB
ALBUMIN SERPL-MCNC: 3.5 GM/DL (ref 3.4–5)
ALP BLD-CCNC: 62 IU/L (ref 40–129)
ALT SERPL-CCNC: 19 U/L (ref 10–40)
ANION GAP SERPL CALCULATED.3IONS-SCNC: 14 MMOL/L (ref 4–16)
AST SERPL-CCNC: 22 IU/L (ref 15–37)
BACTERIA: NEGATIVE /HPF
BASOPHILS ABSOLUTE: 0 K/CU MM
BASOPHILS RELATIVE PERCENT: 0.6 % (ref 0–1)
BILIRUB SERPL-MCNC: 0.2 MG/DL (ref 0–1)
BILIRUBIN URINE: NEGATIVE MG/DL
BLOOD, URINE: NEGATIVE
BUN BLDV-MCNC: 9 MG/DL (ref 6–23)
CALCIUM SERPL-MCNC: 9.1 MG/DL (ref 8.3–10.6)
CHLORIDE BLD-SCNC: 99 MMOL/L (ref 99–110)
CLARITY: ABNORMAL
CO2: 22 MMOL/L (ref 21–32)
COLOR: ABNORMAL
CREAT SERPL-MCNC: 0.7 MG/DL (ref 0.6–1.1)
DIFFERENTIAL TYPE: ABNORMAL
EOSINOPHILS ABSOLUTE: 0.4 K/CU MM
EOSINOPHILS RELATIVE PERCENT: 7.9 % (ref 0–3)
GFR AFRICAN AMERICAN: >60 ML/MIN/1.73M2
GFR NON-AFRICAN AMERICAN: >60 ML/MIN/1.73M2
GLUCOSE BLD-MCNC: 103 MG/DL (ref 70–99)
GLUCOSE, URINE: NEGATIVE MG/DL
HCG QUALITATIVE: NEGATIVE
HCT VFR BLD CALC: 43.3 % (ref 37–47)
HEMOGLOBIN: 13.6 GM/DL (ref 12.5–16)
IMMATURE NEUTROPHIL %: 0.4 % (ref 0–0.43)
KETONES, URINE: ABNORMAL MG/DL
LEUKOCYTE ESTERASE, URINE: NEGATIVE
LYMPHOCYTES ABSOLUTE: 1.4 K/CU MM
LYMPHOCYTES RELATIVE PERCENT: 29.3 % (ref 24–44)
MCH RBC QN AUTO: 29.1 PG (ref 27–31)
MCHC RBC AUTO-ENTMCNC: 31.4 % (ref 32–36)
MCV RBC AUTO: 92.7 FL (ref 78–100)
MONOCYTES ABSOLUTE: 0.6 K/CU MM
MONOCYTES RELATIVE PERCENT: 11.6 % (ref 0–4)
MUCUS: ABNORMAL HPF
NITRITE URINE, QUANTITATIVE: NEGATIVE
NUCLEATED RBC %: 0 %
PDW BLD-RTO: 13 % (ref 11.7–14.9)
PH, URINE: 5 (ref 5–8)
PLATELET # BLD: 199 K/CU MM (ref 140–440)
PMV BLD AUTO: 9.9 FL (ref 7.5–11.1)
POTASSIUM SERPL-SCNC: 4.2 MMOL/L (ref 3.5–5.1)
PROTEIN UA: 30 MG/DL
RAPID INFLUENZA  B AGN: NEGATIVE
RAPID INFLUENZA A AGN: ABNORMAL
RBC # BLD: 4.67 M/CU MM (ref 4.2–5.4)
RBC URINE: 2 /HPF (ref 0–6)
SEGMENTED NEUTROPHILS ABSOLUTE COUNT: 2.5 K/CU MM
SEGMENTED NEUTROPHILS RELATIVE PERCENT: 50.2 % (ref 36–66)
SODIUM BLD-SCNC: 135 MMOL/L (ref 135–145)
SPECIFIC GRAVITY UA: 1.03 (ref 1–1.03)
SQUAMOUS EPITHELIAL: 6 /HPF
TOTAL IMMATURE NEUTOROPHIL: 0.02 K/CU MM
TOTAL NUCLEATED RBC: 0 K/CU MM
TOTAL PROTEIN: 7 GM/DL (ref 6.4–8.2)
TRICHOMONAS: ABNORMAL /HPF
UROBILINOGEN, URINE: 1 MG/DL (ref 0.2–1)
WBC # BLD: 4.9 K/CU MM (ref 4–10.5)
WBC UA: 1 /HPF (ref 0–5)

## 2020-02-09 PROCEDURE — 81001 URINALYSIS AUTO W/SCOPE: CPT

## 2020-02-09 PROCEDURE — 80053 COMPREHEN METABOLIC PANEL: CPT

## 2020-02-09 PROCEDURE — 71045 X-RAY EXAM CHEST 1 VIEW: CPT

## 2020-02-09 PROCEDURE — 87073 CULTURE BACTERIA ANAEROBIC: CPT

## 2020-02-09 PROCEDURE — 85025 COMPLETE CBC W/AUTO DIFF WBC: CPT

## 2020-02-09 PROCEDURE — 99284 EMERGENCY DEPT VISIT MOD MDM: CPT

## 2020-02-09 PROCEDURE — 6370000000 HC RX 637 (ALT 250 FOR IP): Performed by: PHYSICIAN ASSISTANT

## 2020-02-09 PROCEDURE — 87804 INFLUENZA ASSAY W/OPTIC: CPT

## 2020-02-09 PROCEDURE — 87071 CULTURE AEROBIC QUANT OTHER: CPT

## 2020-02-09 PROCEDURE — 87185 SC STD ENZYME DETCJ PER NZM: CPT

## 2020-02-09 PROCEDURE — 84703 CHORIONIC GONADOTROPIN ASSAY: CPT

## 2020-02-09 RX ORDER — DICYCLOMINE HYDROCHLORIDE 10 MG/1
20 CAPSULE ORAL 4 TIMES DAILY PRN
Qty: 30 CAPSULE | Refills: 0 | Status: SHIPPED | OUTPATIENT
Start: 2020-02-09 | End: 2020-10-14 | Stop reason: CLARIF

## 2020-02-09 RX ORDER — HYDROCODONE BITARTRATE AND ACETAMINOPHEN 5; 325 MG/1; MG/1
1 TABLET ORAL ONCE
Status: COMPLETED | OUTPATIENT
Start: 2020-02-09 | End: 2020-02-09

## 2020-02-09 RX ORDER — OSELTAMIVIR PHOSPHATE 75 MG/1
75 CAPSULE ORAL 2 TIMES DAILY
Qty: 10 CAPSULE | Refills: 0 | Status: SHIPPED | OUTPATIENT
Start: 2020-02-09 | End: 2020-02-14

## 2020-02-09 RX ORDER — CLINDAMYCIN HYDROCHLORIDE 150 MG/1
300 CAPSULE ORAL ONCE
Status: COMPLETED | OUTPATIENT
Start: 2020-02-09 | End: 2020-02-09

## 2020-02-09 RX ORDER — ONDANSETRON 4 MG/1
4 TABLET, ORALLY DISINTEGRATING ORAL EVERY 6 HOURS
Qty: 10 TABLET | Refills: 1 | Status: SHIPPED | OUTPATIENT
Start: 2020-02-09 | End: 2020-10-01

## 2020-02-09 RX ORDER — OSELTAMIVIR PHOSPHATE 75 MG/1
75 CAPSULE ORAL ONCE
Status: COMPLETED | OUTPATIENT
Start: 2020-02-09 | End: 2020-02-09

## 2020-02-09 RX ORDER — CLINDAMYCIN HYDROCHLORIDE 300 MG/1
300 CAPSULE ORAL 3 TIMES DAILY
Qty: 30 CAPSULE | Refills: 0 | Status: SHIPPED | OUTPATIENT
Start: 2020-02-09 | End: 2020-02-19

## 2020-02-09 RX ADMIN — HYDROCODONE BITARTRATE AND ACETAMINOPHEN 1 TABLET: 5; 325 TABLET ORAL at 18:42

## 2020-02-09 RX ADMIN — OSELTAMIVIR PHOSPHATE 75 MG: 75 CAPSULE ORAL at 19:35

## 2020-02-09 RX ADMIN — CLINDAMYCIN HYDROCHLORIDE 300 MG: 150 CAPSULE ORAL at 19:35

## 2020-02-09 ASSESSMENT — PAIN DESCRIPTION - ORIENTATION: ORIENTATION: LOWER

## 2020-02-09 ASSESSMENT — PAIN SCALES - GENERAL
PAINLEVEL_OUTOF10: 9
PAINLEVEL_OUTOF10: 8

## 2020-02-09 ASSESSMENT — PAIN DESCRIPTION - PAIN TYPE: TYPE: ACUTE PAIN

## 2020-02-09 ASSESSMENT — PAIN DESCRIPTION - LOCATION: LOCATION: ABDOMEN;BACK

## 2020-02-09 NOTE — ED PROVIDER NOTES
As triage Physician Assistant, I performed a medical screening history and physical exam on this patient. HISTORY OF PRESENT ILLNESS  Danielle De La Torre is a 36 y.o. female  who presents with possible surgical wound dehiscence. Onset 5 days. Context is patient had hernia mesh repair 4 weeks ago. She states for the past 5 days she notes general malaise and is concerned that her surgical incision in her umbilicus is dehiscing. PHYSICAL EXAM  BP (!) 154/102   Pulse 86   Temp 98.2 °F (36.8 °C) (Oral)   Resp 16   Ht 6' (1.829 m)   Wt (!) 390 lb (176.9 kg)   LMP 11/07/2019   SpO2 96%   BMI 52.89 kg/m²         On exam, the patient appears well-hydrated, well-nourished, and in no acute distress. Mucous membranes are moist. Breathing is unlabored. Skin is dry. Mental status appears normal. Moves all extremities, and is without facial droop. Speech is clear. Any necessary Labs, Imaging, and/or treatment were initiated and patient moved to an emergency department exam room. Comment: Please note this report has been produced using speech recognition software and may contain errors related to that system including errors in grammar, punctuation, and spelling, as well as words and phrases that may be inappropriate. If there are any questions or concerns please feel free to contact the dictating provider for clarification.        Washington Farah  02/09/20 9635

## 2020-02-09 NOTE — ED PROVIDER NOTES
eMERGENCY dEPARTMENT eNCOUnter      PCP: Justus Oseguera MD    [address] [facility]    Chief Complaint   Patient presents with    Abdominal Pain    Back Pain    Fever    Other     discharge from umbilicus     Pt was not seen by physician    HPI    Matthew Garcia is a 36 y.o. female who presents with complaints of cough, generalized fatigue, fevers, abdominal pain and concern for wound dehiscence. Patient had hernia repair done around 4 weeks ago with Dr. Kaleigh Lerma.  She has been in the office twice into the ED once since surgery. She did have postoperative pain and she did express some concern that she was having wound dehiscence although she states that every time she was evaluated nobody looked in her umbilicus. She reports that around 5 days ago she had increasing pain and started to notice a discharge from this area. She reports 3 days of cough. 2 days of fever. She also reports several days of some generalized low back pain. Has generalized lower abdominal pain, nausea. She reports 1 episode of vomiting yesterday. No urinary symptoms or stool changes. No bowel or bladder incontinence. No saddle paresthesias. REVIEW OF SYSTEMS    Constitutional:  Denies fever, chills, weight loss or weakness   HENT:  Denies sore throat or ear pain   Cardiovascular:  Denies chest pain, palpitations   Respiratory:  Denies  shortness of breath    GI:  See hpi  :  Denies any urinary symptoms or vaginal symptoms.    Musculoskeletal:  low back pain,   Skin:  Denies rash  Neurologic:  Denies headache, focal weakness or sensory changes   Endocrine:  Denies polyuria or polydypsia   Lymphatic:  Denies swollen glands     All other review of systems are negative  See HPI and nursing notes for additional information     PAST MEDICAL AND SURGICAL HISTORY    Past Medical History:   Diagnosis Date    Anxiety disorder     Arthritis     Asthma     Follows with Lizbeth    Bipolar disorder ([facility] [address]     Not on meds as of 12/3/19 puffs into the lungs 4 times daily 1 Inhaler 5    Elastic Bandages & Supports (KNEE BRACE/CUSHION/L-XL) MISC Please dispense one knee brace to be worn for 8 hrs during the day and taken off at nighttime for right knee for comfort.  1 each 0    ibuprofen (ADVIL;MOTRIN) 600 MG tablet Take 1 tablet by mouth every 6 hours as needed for Pain or Fever 60 tablet 0    gabapentin (NEURONTIN) 300 MG capsule Take 300 mg by mouth 3 times daily         ALLERGIES    Allergies   Allergen Reactions    Latex Hives    Ultram [Tramadol Hcl] Hives       SOCIAL AND FAMILY HISTORY    Social History     Socioeconomic History    Marital status: Single     Spouse name: None    Number of children: None    Years of education: None    Highest education level: None   Occupational History    None   Social Needs    Financial resource strain: None    Food insecurity:     Worry: None     Inability: None    Transportation needs:     Medical: None     Non-medical: None   Tobacco Use    Smoking status: Current Every Day Smoker     Packs/day: 1.00     Years: 18.00     Pack years: 18.00     Types: Cigarettes     Start date: 2001    Smokeless tobacco: Never Used   Substance and Sexual Activity    Alcohol use: No    Drug use: Not Currently     Types: Marijuana     Comment: Last used: 2005    Sexual activity: Yes     Partners: Male   Lifestyle    Physical activity:     Days per week: None     Minutes per session: None    Stress: None   Relationships    Social connections:     Talks on phone: None     Gets together: None     Attends Taoism service: None     Active member of club or organization: None     Attends meetings of clubs or organizations: None     Relationship status: None    Intimate partner violence:     Fear of current or ex partner: None     Emotionally abused: None     Physically abused: None     Forced sexual activity: None   Other Topics Concern    None   Social History Narrative    None     Family History   Problem Relation Age of Onset    Diabetes Mother     High Blood Pressure Mother     Heart Disease Mother     Heart Disease Maternal Grandmother     Heart Disease Maternal Grandfather          PHYSICAL EXAM    VITAL SIGNS: /64   Pulse 86   Temp 98.2 °F (36.8 °C) (Oral)   Resp 16   Ht 6' (1.829 m)   Wt (!) 390 lb (176.9 kg)   LMP 11/07/2019   SpO2 97%   BMI 52.89 kg/m²    Constitutional:  Well developed, Well nourished  HENT:  Normocephalic, Atraumatic, PERRL. EOMI. Sclera clear. Conjunctiva normal, No discharge. Neck/Lymphatics: supple, no JVD, no swollen nodes  Cardiovascular:  Normal heart rate, Normal rhythm, No murmurs  Respiratory:  Nonlabored breathing. Normal breath sounds, No wheezing  Abdomen: Bowel sounds normal, Soft, no masses. 3 well-appearing incisions to the left side of the abdomen. There is some redness within the umbilicus and what appears to be a  bloody scab surrounded by some white discharge. Q-tip was used and there was a show of a yellowish serosanguineous fluid. Tract down estimated to be around 1 cm deep   Generalized lower non-peritoneal abdominal tenderness  Musculoskeletal: No edema, No tenderness, No cyanosis  Generalized lumbar paralumbar tenderness without focus. Neurovascularly intact bilateral lower extremities. Integument:  Warm, Dry  Neurologic:  Alert & oriented , No focal deficits noted. Cranial nerves II through XII grossly intact. Finger to nose intact, rapid alternating movements intact. Normal gross motor coordination & motor strength bilateral upper and lower extremities,  lower extremity DTRs intact. Sensation intact.   Psychiatric:  Affect normal, Mood normal.       Labs:  Results for orders placed or performed during the hospital encounter of 02/09/20   Rapid Flu Swab   Result Value Ref Range    Rapid Influenza A Ag (A) NEGATIVE     POSITIVE  [facility] A CALLED TO ER PA JWAGENKNECHBRANT @[id number] MLT JMCCLOSKEY   RESULTS READ BACK      Rapid Influenza B

## 2020-02-10 ENCOUNTER — TELEPHONE (OUTPATIENT)
Dept: BARIATRICS/WEIGHT MGMT | Age: 35
End: 2020-02-10

## 2020-02-10 NOTE — CARE COORDINATION
CM - room # 34 -pt seen in ER today is a readmission risk . Pt here with concerns  About abdominal pain , general malice and wound dehiscence from recent hernia surgery 4 weeks ago. Pt was evaluated by the PA / MD and was found to be without acute needs the patient will be discharged home .  Adrian Nelson / [facility] /. [address] [facility]

## 2020-02-11 ENCOUNTER — TELEPHONE (OUTPATIENT)
Dept: BARIATRICS/WEIGHT MGMT | Age: 35
End: 2020-02-11

## 2020-02-11 RX ORDER — OXYCODONE HYDROCHLORIDE AND ACETAMINOPHEN 5; 325 MG/1; MG/1
1-2 TABLET ORAL EVERY 6 HOURS PRN
Qty: 25 TABLET | Refills: 0 | Status: SHIPPED | OUTPATIENT
Start: 2020-02-11 | End: 2020-02-18

## 2020-02-13 LAB
CULTURE: ABNORMAL
Lab: ABNORMAL
SPECIMEN: ABNORMAL

## 2020-02-14 ENCOUNTER — OFFICE VISIT (OUTPATIENT)
Dept: BARIATRICS/WEIGHT MGMT | Age: 35
End: 2020-02-14

## 2020-02-14 VITALS
HEIGHT: 71 IN | HEART RATE: 76 BPM | SYSTOLIC BLOOD PRESSURE: 130 MMHG | DIASTOLIC BLOOD PRESSURE: 80 MMHG | BODY MASS INDEX: 41.02 KG/M2 | WEIGHT: 293 LBS | RESPIRATION RATE: 18 BRPM

## 2020-02-14 PROCEDURE — 99024 POSTOP FOLLOW-UP VISIT: CPT | Performed by: SURGERY

## 2020-02-14 NOTE — PROGRESS NOTES
GENERAL SURGERY OFFICE NOTE    SUBJECTIVE:    Patient presenting today referred from Anita Patrick MD and No ref. provider found, for   Chief Complaint   Patient presents with    Post-Op Check     Patient reports her umbilical surgical site dehisced and infected, ED 2/9/20        HPI: Danielle De La Torre is a 36 y.o. female presenting in third, follow up 1 months post op 1/9/20. Procedure:   1. Laparoscopic DaVinci robotic assisted ventral incisional herniorrhaphy  with primary repair followed by an underlay Bard dual mesh underlay 6 cm in diameter. 2. Laparoscopic DaVinci robotic assisted Significant lysis of omental adhesions supra pubically.     ED visit 2/9/20:  Clinical  IMPRESSION    1. Lower abdominal pain    2. Umbilicus discharge    3. Influenza    4. Low back pain without sciatica, unspecified back pain laterality, unspecified chronicity        Patient presents as above. Is concerned that she has having an infection around her incision in her umbilicus. Reports around 5 days of some pain and discharge. On my exam did use culture to try to culture this. There appeared to be a bloody scab and a dried white material.  While obtaining the culture I did push through the scab and expressed a large amount of serosanguineous fluid. Tract was only about a centimeter deep. Suspicious for a seroma. Patient's vital signs are stable. She did sit up and down in the bed with ease with regards to her back pain. I do suspect musculoskeletal source of this. She is afebrile here and remaining vitals normal.  Patient is influenza positive and did elect to start Tamiflu. This could also be influencing her back pain. Discussed with Dr. Catrachita Correa patient's concern and findings today. She did have a CAT scan a couple weeks ago and discussed with surgery and he is in agreement that she does not need repeat imaging as this does sound like a seroma.   He is in agreement with plan for antibiotics and recommends Right ventricular systolic pressure of 29 mm Hg. Mild tricuspid regurgitation, no evidence of any pericardial effusion.  Headache(784.0)     Last headache:  19    History of exercise stress test 2017    treadmill    Miscarriage     x4    Pain management     Was Dr. Grisel Mallory - left his practice 2019      Past Surgical History:   Procedure Laterality Date    CARPAL TUNNEL RELEASE Left 10/2019    Dr. Rickey Antunez Right 2016     SECTION       &     DILATION AND CURETTAGE OF UTERUS       &     HERNIA REPAIR N/A 2020    HERNIA SUPRAUMBILICAL/ VENTRAL REPAIR LAPAROSCOPIC ROBOTIC WITH MESH performed by Tasha Rodriguez MD at [address] [facility] ARTHROSCOPY Right 2019    Dr. Sindy Urrutia      Several on bottom & top - no partials    TUBAL LIGATION       Current Outpatient Medications   Medication Sig Dispense Refill    oxyCODONE-acetaminophen (PERCOCET) 5-325 MG per tablet Take 1-2 tablets by mouth every 6 hours as needed for Pain for up to 7 days.  25 tablet 0    ondansetron (ZOFRAN ODT) 4 MG disintegrating tablet Take 1 tablet by mouth every 6 hours 10 tablet 1    clindamycin (CLEOCIN) 300 MG capsule Take 1 capsule by mouth 3 times daily for 10 days 30 capsule 0    oseltamivir (TAMIFLU) 75 MG capsule Take 1 capsule by mouth 2 times daily for 5 days 10 capsule 0    dicyclomine (BENTYL) 10 MG capsule Take 2 capsules by mouth 4 times daily as needed (abdominal pain) 30 capsule 0    doxycycline hyclate (VIBRAMYCIN) 100 MG capsule       predniSONE (DELTASONE) 20 MG tablet       ondansetron (ZOFRAN) 4 MG tablet Take 1 tablet by mouth daily as needed for Nausea or Vomiting 30 tablet 0    Psyllium (METAMUCIL) 28.3 % POWD Take 862 g by mouth daily 1 Bottle 5    acetaminophen (APAP EXTRA STRENGTH) 500 MG tablet Take 1 tablet by mouth every 6 hours as needed for Pain 20 tablet 0    albuterol sulfate  (90 Base) MCG/ACT inhaler Inhale 2 puffs into the lungs 4 times daily 1 Inhaler 5    Elastic Bandages & Supports (KNEE BRACE/CUSHION/L-XL) MISC Please dispense one knee brace to be worn for 8 hrs during the day and taken off at nighttime for right knee for comfort. 1 each 0    ibuprofen (ADVIL;MOTRIN) 600 MG tablet Take 1 tablet by mouth every 6 hours as needed for Pain or Fever 60 tablet 0    gabapentin (NEURONTIN) 300 MG capsule Take 300 mg by mouth 3 times daily      ibuprofen (ADVIL;MOTRIN) 800 MG tablet Take 0.5 tablets by mouth every 8 hours as needed for Pain 42 tablet 0     No current facility-administered medications for this visit. Allergies   Allergen Reactions    Latex Hives    Ultram [Tramadol Hcl] Hives           Review of Systems      OBJECTIVE:    /80   Pulse 76   Resp 18   Ht 5' 11\" (1.803 m)   Wt (!) 403 lb 12.8 oz (183.2 kg)   BMI 56.32 kg/m²    Body mass index is 56.32 kg/m². Physical Exam      No orders of the defined types were placed in this encounter. No orders of the defined types were placed in this encounter. ASSESSMENT & PLAN:    1. S/P ventral herniorrhaphy         Still smokes, I counseled her. . continue Clinda. . 5 more days. .    Patient counseled on the risks, benefits, and alternatives of treatment plan at length while in the office today. Patient states an understanding and willingness to proceed with the plan. Follow Up:  Return for Follow up Symptoms, PRN - As needed for any new symptom. Virgil Lyman MD, FACS, FICS. 2/14/20       Patient was seen with total face to face time of 25 minutes. More than 50% of this visit was counseling and education as above in my assessment and plan.

## 2020-04-22 ENCOUNTER — TELEMEDICINE (OUTPATIENT)
Dept: BARIATRICS/WEIGHT MGMT | Age: 35
End: 2020-04-22
Payer: COMMERCIAL

## 2020-04-22 PROCEDURE — G8427 DOCREV CUR MEDS BY ELIG CLIN: HCPCS | Performed by: SURGERY

## 2020-04-22 PROCEDURE — G8417 CALC BMI ABV UP PARAM F/U: HCPCS | Performed by: SURGERY

## 2020-04-22 PROCEDURE — 4004F PT TOBACCO SCREEN RCVD TLK: CPT | Performed by: SURGERY

## 2020-04-22 PROCEDURE — 99214 OFFICE O/P EST MOD 30 MIN: CPT | Performed by: SURGERY

## 2020-04-22 ASSESSMENT — ENCOUNTER SYMPTOMS
BLOOD IN STOOL: 0
BACK PAIN: 1
ABDOMINAL PAIN: 1
ANAL BLEEDING: 0
VOMITING: 0
NAUSEA: 0
ABDOMINAL DISTENTION: 1
SORE THROAT: 0
WHEEZING: 0
SHORTNESS OF BREATH: 1
PHOTOPHOBIA: 0
COLOR CHANGE: 0
COUGH: 0
VOICE CHANGE: 0
CONSTIPATION: 0
TROUBLE SWALLOWING: 0
DIARRHEA: 0

## 2020-04-22 NOTE — PROGRESS NOTES
Td) 12/27/2028    Pneumococcal 0-64 years Vaccine  Completed    Hepatitis A vaccine  Aged Out    Hepatitis B vaccine  Aged Out    Hib vaccine  Aged Out    Meningococcal (ACWY) vaccine  Aged Out       PHYSICAL EXAMINATION:  [ INSTRUCTIONS:  \"[x]\" Indicates a positive item  \"[]\" Indicates a negative item  -- DELETE ALL ITEMS NOT EXAMINED]  Vital Signs: (As obtained by patient/caregiver or practitioner observation)    Blood pressure-  Heart rate-    Respiratory rate-    Temperature-  Pulse oximetry-     Constitutional: [x] Appears well-developed and well-nourished [x] No apparent distress      [] Abnormal-   Mental status  [x] Alert and awake  [x] Oriented to person/place/time [x]Able to follow commands      Eyes:  EOM    [x]  Normal  [] Abnormal-  Sclera  [x]  Normal  [] Abnormal -         Discharge [x]  None visible  [] Abnormal -    HENT:   [x] Normocephalic, atraumatic. [] Abnormal   [x] Mouth/Throat: Mucous membranes are moist.     External Ears [x] Normal  [] Abnormal-     Neck: [x] No visualized mass     Pulmonary/Chest: [x] Respiratory effort normal.  [x] No visualized signs of difficulty breathing or respiratory distress        [] Abnormal-      Musculoskeletal:   [x] Normal gait with no signs of ataxia         [x] Normal range of motion of neck        [] Abnormal-       Neurological:        [x] No Facial Asymmetry (Cranial nerve 7 motor function) (limited exam to video visit)          [x] No gaze palsy        [] Abnormal-         Skin:        [x] No significant exanthematous lesions or discoloration noted on facial skin         [] Abnormal-            Psychiatric:       [x] Normal Affect [] No Hallucinations        [] Abnormal-     Other pertinent observable physical exam findings-     ASSESSMENT/PLAN:  1. Morbid obesity with BMI of 50.0-59.9, adult (HCC)    - Hemoglobin A1C; Future  - Iron; Future  - Lipid Panel; Future  - TSH without Reflex;  Future  - Amb Referral to Nutrition Services  - Ambulatory referral to Physical Therapy    403 Lbs 6 ? Ft    Return in about 4 weeks (around 5/20/2020) for Bariatric follow up: diet, exercise & weight loss. Omar Bolanos is a 36 y.o. female being evaluated by a Virtual Visit (video visit) encounter to address concerns as mentioned above. A caregiver was present when appropriate. Due to this being a TeleHealth encounter (During PKIZU-06 public health emergency), evaluation of the following organ systems was limited: Vitals/Constitutional/EENT/Resp/CV/GI//MS/Neuro/Skin/Heme-Lymph-Imm. Pursuant to the emergency declaration under the [address] [place] [address], [address] [place] [address] authority and the [employer] and [employer] Act, this Virtual Visit was conducted with patient's (and/or legal guardian's) consent, to reduce the patient's risk of exposure to COVID-19 and provide necessary medical care. The patient (and/or legal guardian) has also been advised to contact this office for worsening conditions or problems, and seek emergency medical treatment and/or call 911 if deemed necessary. Services were provided through a video synchronous discussion virtually to substitute for in-person clinic visit. Patient and provider were located at their individual homes. --Joesph Gerber MD on 4/22/2020 at 12:43 PM    An electronic signature was used to authenticate this note.

## 2020-05-28 ENCOUNTER — TELEMEDICINE (OUTPATIENT)
Dept: BARIATRICS/WEIGHT MGMT | Age: 35
End: 2020-05-28
Payer: COMMERCIAL

## 2020-05-28 PROCEDURE — G8427 DOCREV CUR MEDS BY ELIG CLIN: HCPCS | Performed by: NURSE PRACTITIONER

## 2020-05-28 PROCEDURE — 99214 OFFICE O/P EST MOD 30 MIN: CPT | Performed by: NURSE PRACTITIONER

## 2020-05-28 ASSESSMENT — ENCOUNTER SYMPTOMS
TROUBLE SWALLOWING: 0
RHINORRHEA: 0
SHORTNESS OF BREATH: 0
DIARRHEA: 0
NAUSEA: 0
CHEST TIGHTNESS: 0
ABDOMINAL DISTENTION: 0
WHEEZING: 0
ABDOMINAL PAIN: 0
EYE PAIN: 0
BACK PAIN: 1

## 2020-05-28 NOTE — PROGRESS NOTES
visualized signs of difficulty breathing or respiratory distress        [] Abnormal-      Musculoskeletal:   [x] Normal gait with no signs of ataxia         [x] Normal range of motion of neck        [] Abnormal-       Neurological:        [x] No Facial Asymmetry (Cranial nerve 7 motor function) (limited exam to video visit)          [x] No gaze palsy        [] Abnormal-         Skin:        [x] No significant exanthematous lesions or discoloration noted on facial skin         [] Abnormal-            Psychiatric:       [x] Normal Affect [x] No Hallucinations        [] Abnormal-     Limited due to virtual visit. ASSESSMENT/PLAN:  1. Pre-op evaluation  - Continue working on clearances. - Ambulatory referral to Pulmonology  - Ambulatory referral to Cardiology    2. Morbid obesity with BMI of 50.0-59.9, adult ([address]  - Patient drinking 3000 calories a day in pop.   - Discussed tapering down by 2-3 per day/week. - Also needs to work on getting breakfast, discussed high protein foods.   - Educated on diet, program requirements and follow ups. - Continue working on clearances.   - Needs visit with RD.   - RTC 1 month with NP. No follow-ups on file. Omar Bolanos is a 36 y.o. female being evaluated by a Virtual Visit (video visit) encounter to address concerns as mentioned above. A caregiver was present when appropriate. Due to this being a TeleHealth encounter (During YKQ-24 public health emergency), evaluation of the following organ systems was limited: Vitals/Constitutional/EENT/Resp/CV/GI//MS/Neuro/Skin/Heme-Lymph-Imm. Pursuant to the emergency declaration under the [address] [place] [address], [address] [place] [address] authority and the [employer] and [employer] Act, this Virtual Visit was conducted with patient's (and/or legal guardian's) consent, to reduce the patient's risk of exposure to COVID-19 and provide necessary medical care.   The patient (and/or legal guardian) has also been advised to contact this office for worsening conditions or problems, and seek emergency medical treatment and/or call 911 if deemed necessary. Patient identification was verified at the start of the visit: Yes    Total time spent on this encounter: 22    Services were provided through a video synchronous discussion virtually to substitute for in-person clinic visit. Patient and provider were located at their individual homes. Patient was seen with total face to face time of 25 minutes. More than 50% of this visit was counseling on diet, nutrition and education as above in my assessment and plan section of my note.     --MARU Sierra - CNP on 5/28/2020 at 4:28 PM

## 2020-06-04 ENCOUNTER — TELEPHONE (OUTPATIENT)
Dept: CARDIOLOGY CLINIC | Age: 35
End: 2020-06-04

## 2020-06-04 ENCOUNTER — TELEPHONE (OUTPATIENT)
Dept: BARIATRICS/WEIGHT MGMT | Age: 35
End: 2020-06-04

## 2020-06-04 NOTE — TELEPHONE ENCOUNTER
Pain management     Was Dr. Nadeen Gilman - left his practice 2019   . Review of Systems - [unfilled]  Otherwise per HPI. Allergies:   Allergies   Allergen Reactions    Latex Hives    Ultram [Tramadol Hcl] Hives       Past Surgical History:  Past Surgical History:   Procedure Laterality Date    CARPAL TUNNEL RELEASE Left 10/2019    Dr. Tom Montes Right 2016     SECTION       &     [address] OF UTERUS       &     HERNIA REPAIR N/A 2020    HERNIA SUPRAUMBILICAL/ VENTRAL REPAIR LAPAROSCOPIC ROBOTIC WITH MESH performed by Delores Mo MD at [address] [facility] ARTHROSCOPY Right 2019    Dr. Casey Piña      Several on bottom & top - no partials    TUBAL LIGATION         Family History:  Family History   Problem Relation Age of Onset    Diabetes Mother     High Blood Pressure Mother     Heart Disease Mother     Heart Disease Maternal Grandmother     Heart Disease Maternal Grandfather        Social History:  Social History     Socioeconomic History    Marital status: Single     Spouse name: Not on file    Number of children: Not on file    Years of education: Not on file    Highest education level: Not on file   Occupational History    Not on file   Social Needs    Financial resource strain: Not on file    Food insecurity     Worry: Not on file     Inability: Not on file    Transportation needs     Medical: Not on file     Non-medical: Not on file   Tobacco Use    Smoking status: Current Every Day Smoker     Packs/day: 1.00     Years: 18.00     Pack years: 18.00     Types: Cigarettes     Start date:     Smokeless tobacco: Never Used   Substance and Sexual Activity    Alcohol use: No    Drug use: Not Currently     Types: Marijuana     Comment: Last used:     Sexual activity: Yes     Partners: Male   Lifestyle    Physical activity     Days per week: Not on file     Minutes per

## 2020-06-10 ENCOUNTER — TELEPHONE (OUTPATIENT)
Dept: CARDIOLOGY CLINIC | Age: 35
End: 2020-06-10

## 2020-06-16 ENCOUNTER — TELEPHONE (OUTPATIENT)
Dept: BARIATRICS/WEIGHT MGMT | Age: 35
End: 2020-06-16

## 2020-06-25 PROBLEM — Z01.818 PRE-OPERATIVE CLEARANCE: Status: ACTIVE | Noted: 2020-06-25

## 2020-06-26 ENCOUNTER — TELEMEDICINE (OUTPATIENT)
Dept: BARIATRICS/WEIGHT MGMT | Age: 35
End: 2020-06-26
Payer: COMMERCIAL

## 2020-06-26 PROCEDURE — G8427 DOCREV CUR MEDS BY ELIG CLIN: HCPCS | Performed by: SURGERY

## 2020-06-26 PROCEDURE — G8417 CALC BMI ABV UP PARAM F/U: HCPCS | Performed by: SURGERY

## 2020-06-26 PROCEDURE — 4004F PT TOBACCO SCREEN RCVD TLK: CPT | Performed by: SURGERY

## 2020-06-26 PROCEDURE — 99214 OFFICE O/P EST MOD 30 MIN: CPT | Performed by: SURGERY

## 2020-06-26 ASSESSMENT — ENCOUNTER SYMPTOMS
WHEEZING: 0
ABDOMINAL DISTENTION: 1
NAUSEA: 0
PHOTOPHOBIA: 0
VOICE CHANGE: 0
BACK PAIN: 1
TROUBLE SWALLOWING: 0
BLOOD IN STOOL: 0
CONSTIPATION: 0
ANAL BLEEDING: 0
DIARRHEA: 0
COLOR CHANGE: 0
VOMITING: 0
SHORTNESS OF BREATH: 1
ABDOMINAL PAIN: 1
COUGH: 0
SORE THROAT: 0

## 2020-06-26 NOTE — PROGRESS NOTES
2020    TELEHEALTH EVALUATION -- Audio/Visual (During VMXIO-09 public health emergency)    HPI:    Yash Hsieh (:  1985) has requested an audio/video evaluation for the following concern(s):    Does not know if she lost weight, exercising with her kids. .    Review of Systems   Constitutional: Positive for fatigue. Negative for activity change, chills, diaphoresis and fever. HENT: Negative for sore throat, trouble swallowing and voice change. Eyes: Negative for photophobia and visual disturbance. Respiratory: Positive for shortness of breath. Negative for cough and wheezing. Cardiovascular: Positive for leg swelling. Negative for chest pain and palpitations. Gastrointestinal: Positive for abdominal distention and abdominal pain. Negative for anal bleeding, blood in stool, constipation, diarrhea, nausea and vomiting. Endocrine: Positive for polyphagia. Negative for cold intolerance, heat intolerance, polydipsia and polyuria. Genitourinary: Positive for urgency. Negative for dysuria, frequency and hematuria. Musculoskeletal: Positive for arthralgias, back pain and gait problem. Negative for joint swelling, myalgias and neck stiffness. Skin: Negative for color change and rash. Neurological: Negative for seizures, speech difficulty, light-headedness and numbness. Hematological: Negative for adenopathy. Does not bruise/bleed easily. Psychiatric/Behavioral: Positive for sleep disturbance. The patient is nervous/anxious. Prior to Visit Medications    Medication Sig Taking?  Authorizing Provider   ondansetron (ZOFRAN ODT) 4 MG disintegrating tablet Take 1 tablet by mouth every 6 hours  Nikhil Marie PA-C   dicyclomine (BENTYL) 10 MG capsule Take 2 capsules by mouth 4 times daily as needed (abdominal pain)  Cheryl Marie PA-C   doxycycline hyclate (VIBRAMYCIN) 100 MG capsule   Historical Provider, MD   predniSONE (DELTASONE) 20 MG tablet   Historical Provider, MD Virtual/Telephone Check-In    Branden Ric verbally consents to a Virtual/Telephone Check-In service on 04/02/20. Patient understands and accepts financial responsibility for any deductible, co-insurance and/or co-pays associated with this service. at a 41 has been now eating every hour and 1/2 to 2 hours in order to keep her blood sugars elevated to normal.  States that she is overdue for thyroid testing her TSH did increase for unknown reasons in the past few months is followed by Dr. Odalys Funk.   Pa ondansetron (ZOFRAN) 4 MG tablet Take 1 tablet by mouth daily as needed for Nausea or Vomiting  Goldy Chawla II, MD   ibuprofen (ADVIL;MOTRIN) 800 MG tablet Take 0.5 tablets by mouth every 8 hours as needed for Pain  Goldy Chawla II, MD   Psyllium (METAMUCIL) 28.3 % POWD Take 862 g by mouth daily  Gaby Cabrera MD   acetaminophen (APAP EXTRA STRENGTH) 500 MG tablet Take 1 tablet by mouth every 6 hours as needed for Pain  Sada Llamas PA-C   albuterol sulfate  (90 Base) MCG/ACT inhaler Inhale 2 puffs into the lungs 4 times daily  Demar Olmos MD   Elastic Bandages & Supports (KNEE BRACE/CUSHION/L-XL) MISC Please dispense one knee brace to be worn for 8 hrs during the day and taken off at nighttime for right knee for comfort. Sada Llamas PA-C   ibuprofen (ADVIL;MOTRIN) 600 MG tablet Take 1 tablet by mouth every 6 hours as needed for Pain or Fever  MARU Pike - CNP   gabapentin (NEURONTIN) 300 MG capsule Take 300 mg by mouth 3 times daily  Historical Provider, MD       Social History     Tobacco Use    Smoking status: Current Every Day Smoker     Packs/day: 1.00     Years: 18.00     Pack years: 18.00     Types: Cigarettes     Start date: 2001    Smokeless tobacco: Never Used   Substance Use Topics    Alcohol use: No    Drug use: Not Currently     Types: Marijuana     Comment: Last used: 2005        Allergies   Allergen Reactions    Latex Hives    Ultram [Tramadol Hcl] Hives   ,   Past Medical History:   Diagnosis Date    Anxiety disorder     Arthritis     Asthma     Follows with Lizbeth    Bipolar disorder ([facility]     Not on meds as of 12/3/19    Depression     currently not on meds (12/3/19)    Drug addiction ([facility]     states previous addiction to vicodin (2011), denies current use. Hx + UDS for cocaine, 12/3/2019    H/O echocardiogram 12/19/2018    Technically difficult examination due to body habitus.  Left ventricular function is low normal, EF is CORTISOL; Future    2. OMAR (generalized anxiety disorder)  Continue with Xanax as regular no increase in dose.   Taper down on the mirtazapine in case it is increasing her need to eat more carbs will use one half and start Lexapro 5 mg for a week then incre vaccine  Aged Out    Hepatitis B vaccine  Aged Out    Hib vaccine  Aged Out    Meningococcal (ACWY) vaccine  Aged Out       PHYSICAL EXAMINATION:  [ INSTRUCTIONS:  \"[x]\" Indicates a positive item  \"[]\" Indicates a negative item  -- DELETE ALL ITEMS NOT EXAMINED]  Vital Signs: (As obtained by patient/caregiver or practitioner observation)    Blood pressure-  Heart rate-    Respiratory rate-    Temperature-  Pulse oximetry-     Constitutional: [x] Appears well-developed and well-nourished [x] No apparent distress      [] Abnormal-   Mental status  [x] Alert and awake  [x] Oriented to person/place/time [x]Able to follow commands      Eyes:  EOM    [x]  Normal  [] Abnormal-  Sclera  [x]  Normal  [] Abnormal -         Discharge [x]  None visible  [] Abnormal -    HENT:   [x] Normocephalic, atraumatic. [] Abnormal   [x] Mouth/Throat: Mucous membranes are moist.     External Ears [x] Normal  [] Abnormal-     Neck: [x] No visualized mass     Pulmonary/Chest: [x] Respiratory effort normal.  [x] No visualized signs of difficulty breathing or respiratory distress        [] Abnormal-      Musculoskeletal:   [x] Normal gait with no signs of ataxia         [x] Normal range of motion of neck        [] Abnormal-       Neurological:        [x] No Facial Asymmetry (Cranial nerve 7 motor function) (limited exam to video visit)          [x] No gaze palsy        [] Abnormal-         Skin:        [x] No significant exanthematous lesions or discoloration noted on facial skin         [] Abnormal-            Psychiatric:       [x] Normal Affect [x] No Hallucinations        [] Abnormal-     Other pertinent observable physical exam findings-     ASSESSMENT/PLAN:    1. Morbid obesity with BMI of 50.0-59.9, adult ([address]  Does not know if she lost weight, exercising with her kids. .  Cardica eval pending July 10th, and pulm not yet done.       Return in about 4 weeks (around 7/24/2020) for For imaging and tests results review, Bariatric follow

## 2020-07-25 PROBLEM — Z01.818 PRE-OPERATIVE CLEARANCE: Status: RESOLVED | Noted: 2020-06-25 | Resolved: 2020-07-25

## 2020-07-30 ENCOUNTER — TELEMEDICINE (OUTPATIENT)
Dept: BARIATRICS/WEIGHT MGMT | Age: 35
End: 2020-07-30
Payer: COMMERCIAL

## 2020-07-30 VITALS — WEIGHT: 293 LBS | BODY MASS INDEX: 56.63 KG/M2

## 2020-07-30 PROCEDURE — G8428 CUR MEDS NOT DOCUMENT: HCPCS | Performed by: NURSE PRACTITIONER

## 2020-07-30 PROCEDURE — 99213 OFFICE O/P EST LOW 20 MIN: CPT | Performed by: NURSE PRACTITIONER

## 2020-07-30 ASSESSMENT — ENCOUNTER SYMPTOMS
ABDOMINAL PAIN: 0
NAUSEA: 0
EYE PAIN: 0
ABDOMINAL DISTENTION: 0
SHORTNESS OF BREATH: 0
WHEEZING: 0
DIARRHEA: 0
TROUBLE SWALLOWING: 0
BACK PAIN: 1
CHEST TIGHTNESS: 0
RHINORRHEA: 0

## 2020-07-30 NOTE — PROGRESS NOTES
0.5 tablets by mouth every 8 hours as needed for Pain  Darshana Collins II, MD   Psyllium (METAMUCIL) 28.3 % POWD Take 862 g by mouth daily  Tyree Jean MD   acetaminophen (APAP EXTRA STRENGTH) 500 MG tablet Take 1 tablet by mouth every 6 hours as needed for Pain  Suyapa Campos PA-C   albuterol sulfate  (90 Base) MCG/ACT inhaler Inhale 2 puffs into the lungs 4 times daily  Moin Fortino Najjar, MD   Elastic Bandages & Supports (KNEE BRACE/CUSHION/L-XL) MISC Please dispense one knee brace to be worn for 8 hrs during the day and taken off at nighttime for right knee for comfort. Suyapa Campos PA-C   ibuprofen (ADVIL;MOTRIN) 600 MG tablet Take 1 tablet by mouth every 6 hours as needed for Pain or Fever  MARU Mcknight - CNP   gabapentin (NEURONTIN) 300 MG capsule Take 300 mg by mouth 3 times daily  Historical Provider, MD       Social History     Tobacco Use    Smoking status: Current Every Day Smoker     Packs/day: 1.00     Years: 18.00     Pack years: 18.00     Types: Cigarettes     Start date: 2001    Smokeless tobacco: Never Used   Substance Use Topics    Alcohol use: No    Drug use: Not Currently     Types: Marijuana     Comment: Last used: 2005        Allergies   Allergen Reactions    Latex Hives    Ultram [Tramadol Hcl] Hives   ,   Past Medical History:   Diagnosis Date    Anxiety disorder     Arthritis     Asthma     Follows with Lizbeth    B[facility] disorder ([facility] [address]     Not on meds as of 12/3/19    Depression     currently not on meds (12/3/19)    Drug addiction ([facility] [address]     states previous addiction to vicodin (2011), denies current use. Hx + UDS for cocaine, 12/3/2019    H/O echocardiogram 12/19/2018    Technically difficult examination due to body habitus. Left ventricular function is low normal, EF is estimated at 45-50%. Mild concentric left ventricular hypertrophy. Mildly dilated left atrium. Right ventricular systolic pressure of 29 mm Hg.  Mild tricuspid regurgitation, no evidence of any pericardial effusion.  Headache(784.0)     Last headache:  19    History of exercise stress test 2017    treadmill    Miscarriage     x4    Pain management     Was Dr. Allen James - [facility] his practice 2019   ,   Past Surgical History:   Procedure Laterality Date    [address] Left 10/2019    Dr. Jasper Nur Right 2016     SECTION       &    [id number] [facility] OF UTERUS       &     HERNIA REPAIR N/A 2020    HERNIA SUPRAUMBILICAL/ VENTRAL REPAIR LAPAROSCOPIC ROBOTIC WITH MESH performed by Danielle Santos MD at [address] [facility] [place], [place] ARTHROSCOPY Right 2019    Dr. Cherie Bland      Several on bottom & top - no partials    TUBAL LIGATION     ,   Social History     Tobacco Use    Smoking status: Current Every Day Smoker     Packs/day: 1.00     Years: 18.00     Pack years: 18.00     Types: Cigarettes     Start date:     Smokeless tobacco: Never Used   Substance Use Topics    Alcohol use: No    Drug use: Not Currently     Types: Marijuana     Comment: Last used:        PHYSICAL EXAMINATION:    Constitutional: [x] Appears well-developed and well-nourished [x] No apparent distress      [] Abnormal-   Mental status  [x] Alert and awake  [x] Oriented to person/place/time [x]Able to follow commands      Eyes:  EOM    [x]  Normal  [] Abnormal-  Sclera  [x]  Normal  [] Abnormal -         Discharge [x]  None visible  [] Abnormal -    HENT:   [x] Normocephalic, atraumatic.   [] Abnormal   [x] Mouth/Throat: Mucous membranes are moist.     External Ears [x] Normal  [] Abnormal-     Neck: [x] No visualized mass     Pulmonary/Chest: [x] Respiratory effort normal.  [x] No visualized signs of difficulty breathing or respiratory distress        [] Abnormal-      Musculoskeletal:   [x] Normal gait with no signs of ataxia         [x] Normal range of motion of neck discussion virtually to substitute for in-person clinic visit. Patient and provider were located at their individual homes.     --MARU Edmonds CNP on 7/30/2020 at 3:24 PM

## 2020-08-14 ENCOUNTER — TELEMEDICINE (OUTPATIENT)
Dept: BARIATRICS/WEIGHT MGMT | Age: 35
End: 2020-08-14

## 2020-08-14 PROCEDURE — 99999 PR OFFICE/OUTPT VISIT,PROCEDURE ONLY: CPT

## 2020-08-14 NOTE — PROGRESS NOTES
Outpatient Nutrition Counseling    REASON FOR VISIT: Pre-Op F/U    Chief Complaint:    Chief Complaint   Patient presents with    Weight Management       SUBJECTIVE:  Pt was seen via video visit today for pre-op f/u. Non-Compliant with diet and no weight loss per NP. Pt continues to drink soda - reports 3 cans daily, unsure if accurate. Not counting calories, still skipping meals. Not making any attempts to change food choice. Counseled again on need to refrain from soda, choose protein foods each meal, not skip meals and include more veggies. Pt verbalized understanding. Emphasized to pt need to lose weight prior to surgery for both surgical safety and insurance approval to show progress on behavior change. The patient is a 30 y.o. female being seen for morbid obesity, considering weight loss surgery; Skylar's,  ,  , Current There is no height or weight on file to calculate BMI. The patient's PCP is Francisco Kelley MD     Comorbid Conditions:  Significant diseases affecting this patient are   Past Medical History:   Diagnosis Date    Anxiety disorder     Arthritis     Asthma     Follows with Lizbeth    Bipolar disorder ([facility] [address]     Not on meds as of 12/3/19    Depression     currently not on meds (12/3/19)    Drug addiction ([facility] [address]     states previous addiction to vicodin (2011), denies current use. Hx + UDS for cocaine, 12/3/2019    H/O echocardiogram 12/19/2018    Technically difficult examination due to body habitus. Left ventricular function is low normal, EF is estimated at 45-50%. Mild concentric left ventricular hypertrophy. Mildly dilated left atrium. Right ventricular systolic pressure of 29 mm Hg. Mild tricuspid regurgitation, no evidence of any pericardial effusion.  YTYZLHAM(131.6)     Last headache:  12/2/19    History of exercise stress test 05/04/2017    treadmill    Miscarriage     x4    Pain management     Was Dr. Alice Gongora - left his practice 11/2019   .     Review of Systems - Review of Systems  Otherwise per HPI. Allergies:   Allergies   Allergen Reactions    Latex Hives    Ultram [Tramadol Hcl] Hives       Past Surgical History:  Past Surgical History:   Procedure Laterality Date    CARPAL TUNNEL RELEASE Left 10/2019    Dr. Ledy Joshi Right 2016     SECTION       &     [address] OF UTERUS       &     HERNIA REPAIR N/A 2020    HERNIA SUPRAUMBILICAL/ VENTRAL REPAIR LAPAROSCOPIC ROBOTIC WITH MESH performed by Gildardo Dakin, MD at [address] [facility] ARTHROSCOPY Right 2019    Dr. Migdalia Rajan      Several on bottom & top - no partials    TUBAL LIGATION         Family History:  Family History   Problem Relation Age of Onset    Diabetes Mother     High Blood Pressure Mother     Heart Disease Mother     Heart Disease Maternal Grandmother     Heart Disease Maternal Grandfather        Social History:  Social History     Socioeconomic History    Marital status: Single     Spouse name: Not on file    Number of children: Not on file    Years of education: Not on file    Highest education level: Not on file   Occupational History    Not on file   Social Needs    Financial resource strain: Not on file    Food insecurity     Worry: Not on file     Inability: Not on file    Transportation needs     Medical: Not on file     Non-medical: Not on file   Tobacco Use    Smoking status: Current Every Day Smoker     Packs/day: 1.00     Years: 18.00     Pack years: 18.00     Types: Cigarettes     Start date:     Smokeless tobacco: Never Used   Substance and Sexual Activity    Alcohol use: No    Drug use: Not Currently     Types: Marijuana     Comment: Last used:     Sexual activity: Yes     Partners: Male   Lifestyle    Physical activity     Days per week: Not on file     Minutes per session: Not on file    Stress: Not on file   Relationships    Social connections Talks on phone: Not on file     Gets together: Not on file     Attends [employer]ism service: Not on file     Active member of club or organization: Not on file     Attends meetings of clubs or organizations: Not on file     Relationship status: Not on file    Intimate partner violence     Fear of current or ex partner: Not on file     Emotionally abused: Not on file     Physically abused: Not on file     Forced sexual activity: Not on file   Other Topics Concern    Not on file   Social History Narrative    Not on file         OBJECTIVE:  Physical Exam   There were no vitals taken for this visit. NUTRITION DIAGNOSIS: Overweight / Obesity   Problem: Increased adiposity compared to reference standard or established norms   Etiology: Excess intake compared to output over time   S/S: Ht: 71\" Wt: 406 lbs BMI: 56.63    NUTRITION INTERVENTIONS:    Individualized treatment goals to address nutritiondiagnosis:   Instructed on 1500 kcal diet for weight loss   Provided suggestions as above   Encouraged Physical activity as approved by physician    MONITORING/ EVALUATION/ PLAN:   Pt verbalized understanding of allmaterials covered   Pt asked pertinent questions throughout the session - expect compliance with nutrition guidelines presented   Provided pt with contact information should questions arise prior to next visit   Will f/u with pt in 3-4 months for further education and post-op diet instructions  ANA Rivera MS, RDN, LD  8/14/2020

## 2020-08-28 ENCOUNTER — TELEMEDICINE (OUTPATIENT)
Dept: BARIATRICS/WEIGHT MGMT | Age: 35
End: 2020-08-28
Payer: COMMERCIAL

## 2020-08-28 PROCEDURE — 99214 OFFICE O/P EST MOD 30 MIN: CPT | Performed by: SURGERY

## 2020-08-28 ASSESSMENT — ENCOUNTER SYMPTOMS
VOICE CHANGE: 0
SORE THROAT: 0
ABDOMINAL DISTENTION: 1
PHOTOPHOBIA: 0
BLOOD IN STOOL: 0
SHORTNESS OF BREATH: 1
COLOR CHANGE: 0
NAUSEA: 0
VOMITING: 0
BACK PAIN: 1
CONSTIPATION: 0
DIARRHEA: 0
ABDOMINAL PAIN: 1
COUGH: 0
TROUBLE SWALLOWING: 0
WHEEZING: 0
ANAL BLEEDING: 0

## 2020-08-28 NOTE — PROGRESS NOTES
2020    TELEHEALTH EVALUATION -- Audio (During CAPYW-16 public health emergency)    HPI:    Renan Sutton (:  1985) has requested an audio evaluation for the following concern(s):    Pain a lot, lower right side, and shoulders. . legs really bad. .stayed around the same. .    Review of Systems   Constitutional: Positive for fatigue. Negative for activity change, chills, diaphoresis and fever. HENT: Negative for sore throat, trouble swallowing and voice change. Eyes: Negative for photophobia and visual disturbance. Respiratory: Positive for shortness of breath. Negative for cough and wheezing. Cardiovascular: Positive for leg swelling. Negative for chest pain and palpitations. Gastrointestinal: Positive for abdominal distention and abdominal pain. Negative for anal bleeding, blood in stool, constipation, diarrhea, nausea and vomiting. Endocrine: Positive for polyphagia. Negative for cold intolerance, heat intolerance, polydipsia and polyuria. Genitourinary: Positive for urgency. Negative for dysuria, frequency and hematuria. Musculoskeletal: Positive for arthralgias, back pain and gait problem. Negative for joint swelling, myalgias and neck stiffness. Skin: Negative for color change and rash. Neurological: Negative for seizures, speech difficulty, light-headedness and numbness. Hematological: Negative for adenopathy. Does not bruise/bleed easily. Psychiatric/Behavioral: Positive for sleep disturbance. The patient is nervous/anxious. Prior to Visit Medications    Medication Sig Taking?  Authorizing Provider   ondansetron (ZOFRAN ODT) 4 MG disintegrating tablet Take 1 tablet by mouth every 6 hours  Nikhil Marie PA-C   dicyclomine (BENTYL) 10 MG capsule Take 2 capsules by mouth 4 times daily as needed (abdominal pain)  Kiley Marie PA-C   doxycycline hyclate (VIBRAMYCIN) 100 MG capsule   Historical Provider, MD   predniSONE (DELTASONE) 20 MG tablet   Historical Provider, MD   ondansetron (ZOFRAN) 4 MG tablet Take 1 tablet by mouth daily as needed for Nausea or Vomiting  Cresencio Alford II, MD   ibuprofen (ADVIL;MOTRIN) 800 MG tablet Take 0.5 tablets by mouth every 8 hours as needed for Pain  Cresencio Alford II, MD   Psyllium (METAMUCIL) 28.3 % POWD Take 862 g by mouth daily  Corey Cruz MD   acetaminophen (APAP EXTRA STRENGTH) 500 MG tablet Take 1 tablet by mouth every 6 hours as needed for Pain  Elina Patterson PA-C   albuterol sulfate  (90 Base) MCG/ACT inhaler Inhale 2 puffs into the lungs 4 times daily  Demar Diaz MD   Elastic Bandages & Supports (KNEE BRACE/CUSHION/L-XL) MISC Please dispense one knee brace to be worn for 8 hrs during the day and taken off at nighttime for right knee for comfort. Elina Patterson PA-C   ibuprofen (ADVIL;MOTRIN) 600 MG tablet Take 1 tablet by mouth every 6 hours as needed for Pain or Fever  MARU Ac - CNP   gabapentin (NEURONTIN) 300 MG capsule Take 300 mg by mouth 3 times daily  Historical Provider, MD       Social History     Tobacco Use    Smoking status: Current Every Day Smoker     Packs/day: 1.00     Years: 18.00     Pack years: 18.00     Types: Cigarettes     Start date: 2001    Smokeless tobacco: Never Used   Substance Use Topics    Alcohol use: No    Drug use: Not Currently     Types: Marijuana     Comment: Last used: 2005        Allergies   Allergen Reactions    Latex Hives    Ultram [Tramadol Hcl] Hives   ,   Past Medical History:   Diagnosis Date    Anxiety disorder     Arthritis     Asthma     Follows with Lizbeth    Bipolar disorder ([facility] [address]     Not on meds as of 12/3/19    Depression     currently not on meds (12/3/19)    Drug addiction ([facility] [address]     states previous addiction to vicodin (2011), denies current use. Hx + UDS for cocaine, 12/3/2019    H/O echocardiogram 12/19/2018    Technically difficult examination due to body habitus.  Left ventricular function is low normal, EF is estimated at 45-50%. Mild concentric left ventricular hypertrophy. Mildly dilated left atrium. Right ventricular systolic pressure of 29 mm Hg. Mild tricuspid regurgitation, no evidence of any pericardial effusion.      Headache(784.0)     Last headache:  19    History of exercise stress test 2017    treadmill    Miscarriage     x4    Pain management     Was Dr. Santa Smith - left his practice 2019   ,   Past Surgical History:   Procedure Laterality Date    CARPAL TUNNEL RELEASE Left 10/2019    Dr. Maryann Hubbard Right 2016     SECTION       &     DILATION AND CURETTAGE OF UTERUS       &     HERNIA REPAIR N/A 2020    HERNIA SUPRAUMBILICAL/ VENTRAL REPAIR LAPAROSCOPIC ROBOTIC WITH MESH performed by Hallie Iqbal MD at [facility] ARTHROSCOPY Right 2019    Dr. Sam Ramos      Several on bottom & top - no partials    TUBAL LIGATION     ,   Social History     Tobacco Use    Smoking status: Current Every Day Smoker     Packs/day: 1.00     Years: 18.00     Pack years: 18.00     Types: Cigarettes     Start date:     Smokeless tobacco: Never Used   Substance Use Topics    Alcohol use: No    Drug use: Not Currently     Types: Marijuana     Comment: Last used:    ,   Family History   Problem Relation Age of Onset    Diabetes Mother     High Blood Pressure Mother     Heart Disease Mother     Heart Disease Maternal Grandmother     Heart Disease Maternal Grandfather    ,   Immunization History   Administered Date(s) Administered    Tdap (Boostrix, Adacel) 2018   ,   Health Maintenance   Topic Date Due    Varicella vaccine (1 of 2 - 2-dose childhood series) 1987    HIV screen  2001    Cervical cancer screen  2007    Flu vaccine (1) 2020    DTaP/Tdap/Td vaccine (4 - Td) 2028    Pneumococcal 0-64 years Vaccine  Completed    Hepatitis A vaccine  Aged Out    Hepatitis B vaccine  Aged Out    Hib vaccine  Aged Out    Meningococcal (ACWY) vaccine  Aged Out       ASSESSMENT/PLAN:  1. Morbid obesity with BMI of 50.0-59.9, adult ([address]    Pulm clearance is next week, cardiolog not yet      Return in about 2 weeks (around 9/11/2020), or EGD, for Bariatric follow up: diet, exercise & weight loss. Elin Matos is a 30 y.o. female being evaluated by a Virtual Visit (video visit) encounter to address concerns as mentioned above. A caregiver was present when appropriate. Due to this being a TeleHealth encounter (During [facility]-23 public health emergency), evaluation of the following organ systems was limited: Vitals/Constitutional/EENT/Resp/CV/GI//MS/Neuro/Skin/Heme-Lymph-Imm. Pursuant to the emergency declaration under the [address] [place] [address], [address] [place] [address] and the [employer] and [employer] Act, this Virtual Visit was conducted with patient's (and/or legal guardian's) consent, to reduce the patient's risk of exposure to COVID-19 and provide necessary medical care. The patient (and/or legal guardian) has also been advised to contact this office for worsening conditions or problems, and seek emergency medical treatment and/or call 911 if deemed necessary. Patient identification was verified at the start of the visit: yes    Total time spent on this encounter: 25 min    Services were provided through a video synchronous discussion virtually to substitute for in-person clinic visit. Patient and provider were located at their individual homes. --Beronica Chatman MD on 8/28/2020 at 10:40 AM    An electronic signature was used to authenticate this note.

## 2020-09-09 ENCOUNTER — TELEPHONE (OUTPATIENT)
Dept: BARIATRICS/WEIGHT MGMT | Age: 35
End: 2020-09-09

## 2020-09-23 ENCOUNTER — TELEMEDICINE (OUTPATIENT)
Dept: BARIATRICS/WEIGHT MGMT | Age: 35
End: 2020-09-23
Payer: COMMERCIAL

## 2020-09-23 PROCEDURE — 99213 OFFICE O/P EST LOW 20 MIN: CPT | Performed by: NURSE PRACTITIONER

## 2020-09-23 PROCEDURE — G8428 CUR MEDS NOT DOCUMENT: HCPCS | Performed by: NURSE PRACTITIONER

## 2020-09-23 ASSESSMENT — ENCOUNTER SYMPTOMS
DIARRHEA: 0
SHORTNESS OF BREATH: 0
ABDOMINAL DISTENTION: 0
CHEST TIGHTNESS: 0
BACK PAIN: 1
TROUBLE SWALLOWING: 0
RHINORRHEA: 0
NAUSEA: 0
EYE PAIN: 0
ABDOMINAL PAIN: 0
WHEEZING: 0

## 2020-09-23 NOTE — PROGRESS NOTES
2020    TELEHEALTH EVALUATION -- Audio/Visual (During CPGVM-56 public health emergency)    HPI:    Audrey Gonzalez (:  1985) has requested an audio/video evaluation for the following concern(s):  Presents today for follow up visit. This is her 6th  visit. She had visit with RD. Patient still drinking pop. Unsure of weight. Does not measure. Drinking 2-3 pops per day. No exercise. Has gained weight. EGD scheduled. Review of Systems   Constitutional: Negative. Negative for appetite change, fatigue and fever. HENT: Negative for congestion, dental problem, hearing loss, rhinorrhea and trouble swallowing. Eyes: Negative for pain. Respiratory: Negative for chest tightness, shortness of breath and wheezing. Cardiovascular: Negative for chest pain, palpitations and leg swelling. Gastrointestinal: Negative for abdominal distention, abdominal pain, diarrhea and nausea. Endocrine: Negative for cold intolerance and polydipsia. Genitourinary: Negative for difficulty urinating and frequency. Musculoskeletal: Positive for arthralgias and back pain. Negative for gait problem. Skin: Negative for rash. Allergic/Immunologic: Negative for environmental allergies. Neurological: Negative for dizziness, seizures and syncope. Hematological: Does not bruise/bleed easily. Psychiatric/Behavioral: Negative for behavioral problems and suicidal ideas. Prior to Visit Medications    Medication Sig Taking?  Authorizing Provider   ondansetron (ZOFRAN ODT) 4 MG disintegrating tablet Take 1 tablet by mouth every 6 hours  Nikhil Marie PA-C   dicyclomine (BENTYL) 10 MG capsule Take 2 capsules by mouth 4 times daily as needed (abdominal pain)  Katia Marie PA-C   doxycycline hyclate (VIBRAMYCIN) 100 MG capsule   Historical Provider, MD   predniSONE (DELTASONE) 20 MG tablet   Historical Provider, MD   ondansetron (ZOFRAN) 4 MG tablet Take 1 tablet by mouth daily as needed for Nausea or Vomiting  Kareem Borja II, MD   ibuprofen (ADVIL;MOTRIN) 800 MG tablet Take 0.5 tablets by mouth every 8 hours as needed for Pain  Kareem Borja II, MD   Psyllium (METAMUCIL) 28.3 % POWD Take 862 g by mouth daily  Bisi Chaves MD   acetaminophen (APAP EXTRA STRENGTH) 500 MG tablet Take 1 tablet by mouth every 6 hours as needed for Pain  Brian Calvert PA-C   albuterol sulfate  (90 Base) MCG/ACT inhaler Inhale 2 puffs into the lungs 4 times daily  Demar Boyce MD   Elastic Bandages & Supports (KNEE BRACE/CUSHION/L-XL) MISC Please dispense one knee brace to be worn for 8 hrs during the day and taken off at nighttime for right knee for comfort. Brian Calvert PA-C   ibuprofen (ADVIL;MOTRIN) 600 MG tablet Take 1 tablet by mouth every 6 hours as needed for Pain or Fever  MARU Menjivar - CNP   gabapentin (NEURONTIN) 300 MG capsule Take 300 mg by mouth 3 times daily  Historical Provider, MD       Social History     Tobacco Use    Smoking status: Current Every Day Smoker     Packs/day: 1.00     Years: 18.00     Pack years: 18.00     Types: Cigarettes     Start date: 2001    Smokeless tobacco: Never Used   Substance Use Topics    Alcohol use: No    Drug use: Not Currently     Types: Marijuana     Comment: Last used: 2005        Allergies   Allergen Reactions    Latex Hives    Ultram [Tramadol Hcl] Hives   ,   Past Medical History:   Diagnosis Date    Anxiety disorder     Arthritis     Asthma     Follows with Lizbeth    B[facility] disorder ([facility] [address]     Not on meds as of 12/3/19    Depression     currently not on meds (12/3/19)    Drug addiction ([facility] [address]     states previous addiction to vicodin (2011), denies current use. Hx + UDS for cocaine, 12/3/2019    H/O echocardiogram 12/19/2018    Technically difficult examination due to body habitus. Left ventricular function is low normal, EF is estimated at 45-50%. Mild concentric left ventricular hypertrophy.  Mildly dilated left GCS verbal subscore is 5. GCS motor subscore is 6. Motor: No abnormal muscle tone. Psychiatric:         Behavior: Behavior normal.         Judgment: Judgment normal.     Limited due to virtual visit. ASSESSMENT/PLAN:  1. Pre-op evaluation  - Will need psych, continue working on clearances. - Amb External Referral To Psychology    2. Morbid obesity with BMI of 50.0-59.9, adult (HCC)  - poor compliance in program.   - Not losing weight.   - Still drinking pop.   - Discussed importance of diet changes in detail and success after surgery. - Following visits must be in-person. Return in about 1 month (around 10/23/2020). Stuart Becerra is a 30 y.o. female being evaluated by a Virtual Visit (video visit) encounter to address concerns as mentioned above. A caregiver was present when appropriate. Due to this being a TeleHealth encounter (During [facility]-03 public health emergency), evaluation of the following organ systems was limited: Vitals/Constitutional/EENT/Resp/CV/GI//MS/Neuro/Skin/Heme-Lymph-Imm. Pursuant to the emergency declaration under the [facility], [address] [place] [address] authority and the [employer] and [employer] Act, this Virtual Visit was conducted with patient's (and/or legal guardian's) consent, to reduce the patient's risk of exposure to COVID-19 and provide necessary medical care. The patient (and/or legal guardian) has also been advised to contact this office for worsening conditions or problems, and seek emergency medical treatment and/or call 911 if deemed necessary. Patient identification was verified at the start of the visit: Yes    Total time spent on this encounter: 15    Services were provided through a video synchronous discussion virtually to substitute for in-person clinic visit. Patient and provider were located at their individual homes.     --MARU Ovalle - CNP on 9/23/2020 at 2:43

## 2020-10-01 RX ORDER — PROPRANOLOL HYDROCHLORIDE 20 MG/1
20 TABLET ORAL 3 TIMES DAILY
COMMUNITY
End: 2022-03-11 | Stop reason: ALTCHOICE

## 2020-10-02 ENCOUNTER — HOSPITAL ENCOUNTER (OUTPATIENT)
Age: 35
Setting detail: SPECIMEN
Discharge: HOME | End: 2020-10-02
Payer: COMMERCIAL

## 2020-10-02 ENCOUNTER — NURSE ONLY (OUTPATIENT)
Dept: SURGERY | Age: 35
End: 2020-10-02
Payer: COMMERCIAL

## 2020-10-02 ENCOUNTER — ANESTHESIA EVENT (OUTPATIENT)
Dept: ENDOSCOPY | Age: 35
End: 2020-10-02
Payer: COMMERCIAL

## 2020-10-02 VITALS — DIASTOLIC BLOOD PRESSURE: 70 MMHG | TEMPERATURE: 98.1 F | SYSTOLIC BLOOD PRESSURE: 130 MMHG | HEART RATE: 77 BPM

## 2020-10-02 PROCEDURE — 99211 OFF/OP EST MAY X REQ PHY/QHP: CPT | Performed by: SURGERY

## 2020-10-02 PROCEDURE — U0002 COVID-19 LAB TEST NON-CDC: HCPCS

## 2020-10-02 ASSESSMENT — ENCOUNTER SYMPTOMS: SHORTNESS OF BREATH: 1

## 2020-10-02 NOTE — PROGRESS NOTES
Patient collected pre-op COVID-19 screening instruction and collection supplies given to patient accordingly. Patient denies fever/cough/sob or recent travels. Patient voiced understanding of collection/quarantine instructions. COVID screening lab ordered, collection completed without difficulty, identifiers placed on specimen. AVS given at discharge. Results will be given via mychart or telephone call [facility] Dept will manage any positive test results, the procedure will be rescheduled at a later date).

## 2020-10-02 NOTE — ANESTHESIA PRE PROCEDURE
Department of Anesthesiology  Preprocedure Note       Name:  Arabella Rios   Age:  30 y.o.  :  1985                                          MRN:  [id number]         Date:  10/2/2020      Surgeon: Vicky Lee):  Jeancarlos Calderon MD    Procedure: Procedure(s):  EGD ESOPHAGOGASTRODUODENOSCOPY WITH BX    Medications prior to admission:   Prior to Admission medications    Medication Sig Start Date End Date Taking? Authorizing Provider   lisdexamfetamine (VYVANSE) 50 MG capsule Take 50 mg by mouth every morning. Yes Historical Provider, MD   propranolol (INDERAL) 20 MG tablet Take 20 mg by mouth 3 times daily   Yes Historical Provider, MD   dicyclomine (BENTYL) 10 MG capsule Take 2 capsules by mouth 4 times daily as needed (abdominal pain) 20   Kylee Marie PA-C   ondansetron (ZOFRAN) 4 MG tablet Take 1 tablet by mouth daily as needed for Nausea or Vomiting 20   Reyna Walsh II, MD   acetaminophen (APAP EXTRA STRENGTH) 500 MG tablet Take 1 tablet by mouth every 6 hours as needed for Pain 19   Félix Castellanos PA-C   albuterol sulfate  (90 Base) MCG/ACT inhaler Inhale 2 puffs into the lungs 4 times daily 19   Demar Harris MD   Elastic Bandages & Supports (KNEE BRACE/CUSHION/L-XL) MISC Please dispense one knee brace to be worn for 8 hrs during the day and taken off at nighttime for right knee for comfort. 18   Félix Castellanos PA-C   ibuprofen (ADVIL;MOTRIN) 600 MG tablet Take 1 tablet by mouth every 6 hours as needed for Pain or Fever 17   MARU Ocasio - CNP   gabapentin (NEURONTIN) 300 MG capsule Take 300 mg by mouth 3 times daily    Historical Provider, MD       Current medications:    No current facility-administered medications for this encounter. Current Outpatient Medications   Medication Sig Dispense Refill    lisdexamfetamine (VYVANSE) 50 MG capsule Take 50 mg by mouth every morning.       propranolol (INDERAL) 20 MG tablet Take 20 mg F41.0    Secondary hypertension I15.9    GOGO (stress urinary incontinence, female) N39.3    Ventral hernia without obstruction or gangrene K43.9    Incarcerated ventral hernia K43.6    S/P ventral herniorrhaphy Z98.890, Z87.19       Past Medical History:        Diagnosis Date    Anxiety disorder     Arthritis     Asthma     Follows with Lizbeth    Bipolar disorder ([facility]     Not on meds as of 12/3/19    Depression     currently not on meds (12/3/19)    Drug addiction ([facility]     states previous addiction to vicodin (), denies current use. Hx + UDS for cocaine, 12/3/2019    Family history of heart disease     \"had to see Dr Harriett Castellano and he said every thing ok- approx 2 yrs ago \"    H/O echocardiogram 2018    Technically difficult examination due to body habitus. Left ventricular function is low normal, EF is estimated at 45-50%. Mild concentric left ventricular hypertrophy. Mildly dilated left atrium. Right ventricular systolic pressure of 29 mm Hg. Mild tricuspid regurgitation, no evidence of any pericardial effusion.      GTMFBTGM(721.4)     Last headache:  19    History of exercise stress test 2017    treadmill    Miscarriage     x4    Pain management     Was Dr. Anthony Black - left his practice 2019    Prolonged emergence from general anesthesia     \"with last surgery had trouble keeping me awake and ended up admitted me- and oxygen level kept going down    Sleep apnea     sleep study 2019- has cpap does not wear daily    Wears glasses        Past Surgical History:        Procedure Laterality Date    CARPAL TUNNEL RELEASE Left 10/2019    Dr. Braxton Navarro Right 2016     SECTION       &     DILATION AND CURETTAGE OF UTERUS       &     HERNIA REPAIR N/A 2020    HERNIA SUPRAUMBILICAL/ VENTRAL REPAIR LAPAROSCOPIC ROBOTIC WITH MESH performed by Shashi Diggs MD at [facility] ARTHROSCOPY Right 2019    Dr. Eileen Lyons TONSILLECTOMY  2002    TOOTH EXTRACTION      Several on bottom & top - no partials    TUBAL LIGATION  2014       Social History:    Social History     Tobacco Use    Smoking status: Current Every Day Smoker     Packs/day: 1.00     Years: 18.00     Pack years: 18.00     Types: Cigarettes     Start date: 2001    Smokeless tobacco: Never Used   Substance Use Topics    Alcohol use: No                                Ready to quit: Not Answered  Counseling given: Not Answered      Vital Signs (Current):   Vitals:    09/30/20 1435   Weight: (!) 409 lb (185.5 kg)   Height: 6' (1.829 m)                                              BP Readings from Last 3 Encounters:   02/14/20 130/80   02/09/20 116/74   01/22/20 126/60       NPO Status:                                                                                 BMI:   Wt Readings from Last 3 Encounters:   07/30/20 (!) 406 lb (184.2 kg)   02/14/20 (!) 403 lb 12.8 oz (183.2 kg)   02/09/20 (!) 390 lb (176.9 kg)     Body mass index is 55.47 kg/m². CBC:   Lab Results   Component Value Date    WBC 4.9 02/09/2020    RBC 4.67 02/09/2020    HGB 13.6 02/09/2020    HCT 43.3 02/09/2020    MCV 92.7 02/09/2020    RDW 13.0 02/09/2020     02/09/2020       CMP:   Lab Results   Component Value Date     02/09/2020    K 4.2 02/09/2020    CL 99 02/09/2020    CO2 22 02/09/2020    BUN 9 02/09/2020    CREATININE 0.7 02/09/2020    GFRAA >60 02/09/2020    LABGLOM >60 02/09/2020    GLUCOSE 103 02/09/2020    PROT 7.0 02/09/2020    PROT 6.3 12/28/2012    CALCIUM 9.1 02/09/2020    BILITOT 0.2 02/09/2020    ALKPHOS 62 02/09/2020    AST 22 02/09/2020    ALT 19 02/09/2020       POC Tests: No results for input(s): POCGLU, POCNA, POCK, POCCL, POCBUN, POCHEMO, POCHCT in the last 72 hours.     Coags:   Lab Results   Component Value Date    PROTIME 11.8 11/14/2013    INR 1.09 11/14/2013    APTT 28.1 11/14/2013       HCG (If Applicable):   Lab Results   Component Value Date    PREGTESTUR NEGATIVE 01/13/2020        ABGs: No results found for: PHART, PO2ART, SMN1WQV, PUB6THE, BEART, P7VXXSYY     Type & Screen (If Applicable):  No results found for: LABABO, LABRH    Drug/Infectious Status (If Applicable):  No results found for: HIV, HEPCAB    COVID-19 Screening (If Applicable): No results found for: COVID19      Anesthesia Evaluation  Patient summary reviewed  Airway: Mallampati: III  TM distance: >3 FB   Neck ROM: full  Mouth opening: > = 3 FB Dental:          Pulmonary:normal exam    (+) shortness of breath:  sleep apnea:  asthma:                            Cardiovascular:    (+) hypertension:, ROLON:,       ECG reviewed      Echocardiogram reviewed               ROS comment:  Technically difficult examination due to body habitus. Left ventricular function is low normal , EF is estimated at 45-50%. Mild concentric left ventricular hypertrophy. Mildly dilated left atrium. Right ventricular systolic pressure of 29 mm Hg. Mild tricuspid regurgitation. No evidence of any pericardial effusion. Neuro/Psych:   (+) headaches:, psychiatric history:depression/anxiety             GI/Hepatic/Renal:             Endo/Other:              Pt had no PAT visit       Abdominal:           Vascular:                                      Anesthesia Plan      MAC and general     ASA 3     (Chart review)  Induction: intravenous. MIPS: Postoperative opioids intended. Anesthetic plan and risks discussed with patient. Plan discussed with CRNA.     Attending anesthesiologist reviewed and agrees with Pre Eval content

## 2020-10-02 NOTE — PATIENT INSTRUCTIONS
Pre-Procedure COVID-19 Self Testing  Quarantine Instructions  Day of Surgery Instructions         What to do before my surgery:    All patients scheduled for elective surgery must test for COVID19 72-96 hours prior to the surgery date.  Pre-Procedure COVID-19 Self-Test will be scheduled for you by your provider.  You can receive your Pre-Procedure COVID-19 Self-Test at:  [facility]. [address], [place], [address] [facility]   If you do not have the COVID-19 test we will cancel or reschedule your procedure   Once you test you must quarantine at home until after your procedure with only your immediate family members or whoever lives with you.  If you must work during your quarantine period, we ask that you continue to practice social distancing, wear a mask that covers your mouth and nose and perform all hand hygiene as recommended by the CDC.  If you must go to the grocery, etc. and cannot get someone to do this for you please wear a mask that covers your mouth and nose and perform all hand hygiene as recommended by the CDC.  Your surgeon's office will notify you with any concerns about your test result. What can I expect on the day of surgery?  Arrive at the time the office or hospital staff tell you on the day of your procedure.  Wear a mask when entering the hospital.     A member of the hospital staff will take your temperature and ask you a few questions as you enter the building.  In abundance of caution for the safety of all our patients and staff, please follow all hospital visitor guidelines in place at the time of your procedure. The staff caring for you will stay in close communication with your loved one and keep them updated on progress.  Please provide a phone number for us to use when communicating with your family or ride home.    When you are ready to discharge, we will notify your family/person with you to bring the car to the front entrance. We will take you to them after you receive all of your discharge instructions.

## 2020-10-04 LAB
SARS-COV-2: NOT DETECTED
SOURCE: NORMAL

## 2020-10-05 ENCOUNTER — ANESTHESIA (OUTPATIENT)
Dept: ENDOSCOPY | Age: 35
End: 2020-10-05
Payer: COMMERCIAL

## 2020-10-05 ENCOUNTER — HOSPITAL ENCOUNTER (OUTPATIENT)
Age: 35
Setting detail: OUTPATIENT SURGERY
Discharge: HOME | End: 2020-10-05
Attending: SURGERY | Admitting: SURGERY
Payer: COMMERCIAL

## 2020-10-05 VITALS
DIASTOLIC BLOOD PRESSURE: 48 MMHG | SYSTOLIC BLOOD PRESSURE: 113 MMHG | OXYGEN SATURATION: 100 % | RESPIRATION RATE: 22 BRPM

## 2020-10-05 VITALS
TEMPERATURE: 97.6 F | HEIGHT: 72 IN | RESPIRATION RATE: 18 BRPM | BODY MASS INDEX: 39.68 KG/M2 | HEART RATE: 72 BPM | OXYGEN SATURATION: 94 % | SYSTOLIC BLOOD PRESSURE: 108 MMHG | WEIGHT: 293 LBS | DIASTOLIC BLOOD PRESSURE: 61 MMHG

## 2020-10-05 LAB
AMPHETAMINES: ABNORMAL
BARBITURATE SCREEN URINE: NEGATIVE
BENZODIAZEPINE SCREEN, URINE: NEGATIVE
CANNABINOID SCREEN URINE: NEGATIVE
COCAINE METABOLITE: NEGATIVE
OPIATES, URINE: NEGATIVE
OXYCODONE: ABNORMAL
PHENCYCLIDINE, URINE: NEGATIVE
PREGNANCY TEST URINE, POC: NEGATIVE

## 2020-10-05 PROCEDURE — 2580000003 HC RX 258: Performed by: SURGERY

## 2020-10-05 PROCEDURE — 87077 CULTURE AEROBIC IDENTIFY: CPT

## 2020-10-05 PROCEDURE — 88342 IMHCHEM/IMCYTCHM 1ST ANTB: CPT

## 2020-10-05 PROCEDURE — 80307 DRUG TEST PRSMV CHEM ANLYZR: CPT

## 2020-10-05 PROCEDURE — 3700000001 HC ADD 15 MINUTES (ANESTHESIA): Performed by: SURGERY

## 2020-10-05 PROCEDURE — 7100000011 HC PHASE II RECOVERY - ADDTL 15 MIN: Performed by: SURGERY

## 2020-10-05 PROCEDURE — 2500000003 HC RX 250 WO HCPCS: Performed by: NURSE ANESTHETIST, CERTIFIED REGISTERED

## 2020-10-05 PROCEDURE — 43239 EGD BIOPSY SINGLE/MULTIPLE: CPT | Performed by: SURGERY

## 2020-10-05 PROCEDURE — 2709999900 HC NON-CHARGEABLE SUPPLY: Performed by: SURGERY

## 2020-10-05 PROCEDURE — 3609012400 HC EGD TRANSORAL BIOPSY SINGLE/MULTIPLE: Performed by: SURGERY

## 2020-10-05 PROCEDURE — 7100000010 HC PHASE II RECOVERY - FIRST 15 MIN: Performed by: SURGERY

## 2020-10-05 PROCEDURE — 81025 URINE PREGNANCY TEST: CPT

## 2020-10-05 PROCEDURE — 6360000002 HC RX W HCPCS: Performed by: NURSE ANESTHETIST, CERTIFIED REGISTERED

## 2020-10-05 PROCEDURE — 3700000000 HC ANESTHESIA ATTENDED CARE: Performed by: SURGERY

## 2020-10-05 PROCEDURE — 2580000003 HC RX 258: Performed by: NURSE ANESTHETIST, CERTIFIED REGISTERED

## 2020-10-05 PROCEDURE — 88305 TISSUE EXAM BY PATHOLOGIST: CPT

## 2020-10-05 RX ORDER — SODIUM CHLORIDE, SODIUM LACTATE, POTASSIUM CHLORIDE, CALCIUM CHLORIDE 600; 310; 30; 20 MG/100ML; MG/100ML; MG/100ML; MG/100ML
INJECTION, SOLUTION INTRAVENOUS CONTINUOUS PRN
Status: DISCONTINUED | OUTPATIENT
Start: 2020-10-05 | End: 2020-10-05 | Stop reason: SDUPTHER

## 2020-10-05 RX ORDER — SODIUM CHLORIDE, SODIUM LACTATE, POTASSIUM CHLORIDE, CALCIUM CHLORIDE 600; 310; 30; 20 MG/100ML; MG/100ML; MG/100ML; MG/100ML
INJECTION, SOLUTION INTRAVENOUS ONCE
Status: COMPLETED | OUTPATIENT
Start: 2020-10-05 | End: 2020-10-05

## 2020-10-05 RX ORDER — KETAMINE HYDROCHLORIDE 10 MG/ML
INJECTION, SOLUTION INTRAMUSCULAR; INTRAVENOUS PRN
Status: DISCONTINUED | OUTPATIENT
Start: 2020-10-05 | End: 2020-10-05 | Stop reason: SDUPTHER

## 2020-10-05 RX ORDER — LIDOCAINE HYDROCHLORIDE 20 MG/ML
INJECTION, SOLUTION INFILTRATION; PERINEURAL PRN
Status: DISCONTINUED | OUTPATIENT
Start: 2020-10-05 | End: 2020-10-05 | Stop reason: SDUPTHER

## 2020-10-05 RX ORDER — PROPOFOL 10 MG/ML
INJECTION, EMULSION INTRAVENOUS PRN
Status: DISCONTINUED | OUTPATIENT
Start: 2020-10-05 | End: 2020-10-05 | Stop reason: SDUPTHER

## 2020-10-05 RX ADMIN — DEXMEDETOMIDINE 10 MCG: 100 INJECTION, SOLUTION, CONCENTRATE INTRAVENOUS at 13:00

## 2020-10-05 RX ADMIN — LIDOCAINE HYDROCHLORIDE 100 MG: 20 INJECTION, SOLUTION INFILTRATION; PERINEURAL at 13:02

## 2020-10-05 RX ADMIN — LIDOCAINE HYDROCHLORIDE 100 MG: 20 INJECTION, SOLUTION INFILTRATION; PERINEURAL at 13:00

## 2020-10-05 RX ADMIN — KETAMINE HYDROCHLORIDE 5 MG: 10 INJECTION INTRAMUSCULAR; INTRAVENOUS at 13:02

## 2020-10-05 RX ADMIN — KETAMINE HYDROCHLORIDE 10 MG: 10 INJECTION INTRAMUSCULAR; INTRAVENOUS at 13:00

## 2020-10-05 RX ADMIN — SODIUM CHLORIDE, POTASSIUM CHLORIDE, SODIUM LACTATE AND CALCIUM CHLORIDE: 600; 310; 30; 20 INJECTION, SOLUTION INTRAVENOUS at 11:33

## 2020-10-05 RX ADMIN — DEXMEDETOMIDINE 10 MCG: 100 INJECTION, SOLUTION, CONCENTRATE INTRAVENOUS at 12:55

## 2020-10-05 RX ADMIN — DEXMEDETOMIDINE 10 MCG: 100 INJECTION, SOLUTION, CONCENTRATE INTRAVENOUS at 12:51

## 2020-10-05 RX ADMIN — PROPOFOL 150 MG: 10 INJECTION, EMULSION INTRAVENOUS at 13:00

## 2020-10-05 RX ADMIN — SODIUM CHLORIDE, POTASSIUM CHLORIDE, SODIUM LACTATE AND CALCIUM CHLORIDE: 600; 310; 30; 20 INJECTION, SOLUTION INTRAVENOUS at 12:14

## 2020-10-05 ASSESSMENT — ENCOUNTER SYMPTOMS: SHORTNESS OF BREATH: 1

## 2020-10-05 ASSESSMENT — PAIN SCALES - GENERAL
PAINLEVEL_OUTOF10: 0
PAINLEVEL_OUTOF10: 0

## 2020-10-05 ASSESSMENT — LIFESTYLE VARIABLES: SMOKING_STATUS: 1

## 2020-10-05 ASSESSMENT — PAIN - FUNCTIONAL ASSESSMENT: PAIN_FUNCTIONAL_ASSESSMENT: 0-10

## 2020-10-05 NOTE — PROGRESS NOTES
1335  Up to side of bed. Drinking beverage. Call light in reach. Denies any discomfort. Will continue to monitor. [address]  Pt up to dress. Call light in reach. Denies any discomfort.

## 2020-10-05 NOTE — ANESTHESIA PRE PROCEDURE
Department of Anesthesiology  Preprocedure Note       Name:  Keshawn Tse   Age:  30 y.o.  :  1985                                          MRN:  [id number]         Date:  10/5/2020      Surgeon: Kelsey Cedeno):  Palma Manning MD    Procedure: EGD ESOPHAGOGASTRODUODENOSCOPY WITH BX (N/A )    Medications prior to admission:   Prior to Admission medications    Medication Sig Start Date End Date Taking? Authorizing Provider   lisdexamfetamine (VYVANSE) 50 MG capsule Take 50 mg by mouth every morning. Historical Provider, MD   propranolol (INDERAL) 20 MG tablet Take 20 mg by mouth 3 times daily    Historical Provider, MD   dicyclomine (BENTYL) 10 MG capsule Take 2 capsules by mouth 4 times daily as needed (abdominal pain) 20   Darleen Marie PA-C   ondansetron (ZOFRAN) 4 MG tablet Take 1 tablet by mouth daily as needed for Nausea or Vomiting 20   Francisco Nagel II, MD   acetaminophen (APAP EXTRA STRENGTH) 500 MG tablet Take 1 tablet by mouth every 6 hours as needed for Pain 19   Layne Posadas PA-C   albuterol sulfate  (90 Base) MCG/ACT inhaler Inhale 2 puffs into the lungs 4 times daily 19   Demar Klein MD   Elastic Bandages & Supports (KNEE BRACE/CUSHION/L-XL) MISC Please dispense one knee brace to be worn for 8 hrs during the day and taken off at nighttime for right knee for comfort. 18   Layne Posadas PA-C   ibuprofen (ADVIL;MOTRIN) 600 MG tablet Take 1 tablet by mouth every 6 hours as needed for Pain or Fever 17   Douglas Low, APRN - CNP   gabapentin (NEURONTIN) 300 MG capsule Take 300 mg by mouth 3 times daily    Historical Provider, MD       Current medications:    No current outpatient medications on file. No current facility-administered medications for this visit. Allergies:     Allergies   Allergen Reactions    Latex Hives    Shellfish-Derived Products      \"break out like tomato and swell - trouble swallowing- with shrimp  Ultram [Tramadol Hcl] Hives       Problem List:    Patient Active Problem List   Diagnosis Code    Pregnancy with poor obstetric history O09.299    Obesity complicating pregnancy in third trimester O99.213    Back pain in pregnancy O99.891, M54.9    Chest pain R07.9    Morbid obesity with BMI of 50.0-59.9, adult ([facility] E66.01, Z68.43    Mild intermittent asthma without complication L94.48    Shortness of breath on exertion R06.02    BASHIR (obstructive sleep apnea) G47.33    Drug-induced constipation K59.03    Diarrhea R19.7    Arthritis M19.90    Bilateral chronic knee pain M25.561, M25.562, G89.29    Pain of both hip joints M25.551, M25.552    Chronic bilateral low back pain without sciatica M54.5, G89.29    Episodic tension-type headache, not intractable G44.219    Nausea and vomiting in adult R11.2    Reactive depression F32.9    Anxiety F41.9    Panic attack F41.0    Secondary hypertension I15.9    GOGO (stress urinary incontinence, female) N39.3    Ventral hernia without obstruction or gangrene K43.9    Incarcerated ventral hernia K43.6    S/P ventral herniorrhaphy Z98.890, Z87.19       Past Medical History:        Diagnosis Date    Anxiety disorder     Arthritis     Asthma     Follows with Lizbeth    Bipolar disorder ([facility]     Not on meds as of 12/3/19    Depression     currently not on meds (12/3/19)    Drug addiction ([facility]     states previous addiction to vicodin (2011), denies current use. Hx + UDS for cocaine, 12/3/2019    Family history of heart disease     \"had to see Dr Tommy Samson and he said every thing ok- approx 2 yrs ago \"    H/O echocardiogram 12/19/2018    Technically difficult examination due to body habitus. Left ventricular function is low normal, EF is estimated at 45-50%. Mild concentric left ventricular hypertrophy. Mildly dilated left atrium. Right ventricular systolic pressure of 29 mm Hg.  Mild tricuspid regurgitation, no evidence of any pericardial effusion.  KTBFMWZF(236.4)     Last headache:  19    History of exercise stress test 2017    treadmill    Miscarriage     x4    Pain management     Was Dr. Lloyd Yu - left his practice 2019    Prolonged emergence from general anesthesia     \"with last surgery had trouble keeping me awake and ended up admitted me- and oxygen level kept going down    Sleep apnea     sleep study 2019- has cpap does not wear daily    Wears glasses        Past Surgical History:        Procedure Laterality Date    CARPAL TUNNEL RELEASE Left 10/2019    Dr. Edwige Cunningham Right 2016     SECTION       &     DILATION AND CURETTAGE OF UTERUS       &     HERNIA REPAIR N/A 2020    HERNIA SUPRAUMBILICAL/ VENTRAL REPAIR LAPAROSCOPIC ROBOTIC WITH MESH performed by Jose Interiano MD at [address] [facility] ARTHROSCOPY Right 2019    Dr. Quinten Torrez      Several on bottom & top - no partials    TUBAL LIGATION         Social History:    Social History     Tobacco Use    Smoking status: Current Every Day Smoker     Packs/day: 1.00     Years: 18.00     Pack years: 18.00     Types: Cigarettes     Start date:     Smokeless tobacco: Never Used   Substance Use Topics    Alcohol use: No                                Ready to quit: Not Answered  Counseling given: Not Answered      Vital Signs (Current): There were no vitals filed for this visit.                                            BP Readings from Last 3 Encounters:   10/05/20 (!) 121/56   10/02/20 130/70   20 130/80       NPO Status:                                                                                 BMI:   Wt Readings from Last 3 Encounters:   10/05/20 (!) 409 lb (185.5 kg)   20 (!) 406 lb (184.2 kg)   20 (!) 403 lb 12.8 oz (183.2 kg)     There is no height or weight on file to calculate BMI.    CBC:   Lab Results   Component Value Date    WBC

## 2020-10-05 NOTE — ANESTHESIA POSTPROCEDURE EVALUATION
Department of Anesthesiology  Postprocedure Note    Patient: Karen Braden  MRN: [id number]  YOB: 1985  Date of evaluation: 10/5/2020  Time:  1:16 PM     Procedure Summary     Date:  10/05/20 Room / Location:  [address]    Anesthesia Start:  1250 Anesthesia Stop:  7936    Procedure:  EGD BIOPSY (N/A ) Diagnosis:  (MORBID OBESITY)    Surgeon:  Pascale Best MD Responsible Provider:  MARU Matta CRNA    Anesthesia Type:  MAC ASA Status:  3          Anesthesia Type: MAC    Louise Phase I:      Louise Phase II:      Last vitals: Reviewed and per EMR flowsheets.        Anesthesia Post Evaluation    Patient location during evaluation: bedside  Patient participation: complete - patient participated  Level of consciousness: awake and alert  Pain score: 0  Airway patency: patent  Nausea & Vomiting: no nausea and no vomiting  Complications: no  Cardiovascular status: blood pressure returned to baseline and hemodynamically stable  Respiratory status: acceptable, room air and spontaneous ventilation  Hydration status: euvolemic

## 2020-10-05 NOTE — PROGRESS NOTES
Patient taken back to [facility], patient awake and alert, ambulated to bathroom, no pain or needs voiced. Bedside report given to Tomeka Miguel [place].

## 2020-10-05 NOTE — PROGRESS NOTES
1355  Discharge instructions provided. Questions answered. Verbalized understanding. 1400  Discharged home ambulatory accompanied by writer to facility outside door to private vehicle driven by family. Belongings taken. Denies any discomfort. No distress noted.

## 2020-10-05 NOTE — H&P
HISTORY AND PHYSICAL EXAM    Date of Admission:  10/5/2020    PCP:  Alejandra Cedillo MD    Chief Complaint / History of Present Illness:  Casie Medina is a very pleasant 30 y.o. female who presents today for her preop EGD eval before her bariatric procedure. As noted her Body mass index is 55.47 kg/m². with significant co-morbidities as below. The patient has been complying with the pre-operative workup, lifestyle modifications and changes with the bariatric clinic, including:  Dietitian evaluation and counseling, psychologist evaluation and counseling, Exercise physiologist evaluation and counseling, cardiac preoperative clearance and optimization, pulmonology preoperative clearance and optimization, today will proceed with preoperative EGD for GERD, morbid obesity: Body mass index is 55.47 kg/m². , in preparation for a bariatric procedure; to r/o GERD, Hiatal Hernia, Peptic Ulcer Disease, Gastroparesis, Esophageal dysmotility; etc.    Per Car Carr last visit:  Morbid obesity with BMI of 50.0-59.9, adult ([facility] [address]  - poor compliance in program.   - Not losing weight.   - Still drinking pop.   - Discussed importance of diet changes in detail and success after surgery. - Following visits must be in-person. All risks and benefits, potential complications and possible alternatives discussed, and agreeable to proceed. PMH:   has a past medical history of Anxiety disorder, Arthritis, Asthma, Bipolar disorder ([facility] [address] Depression, Drug addiction ([facility] [address] Family history of heart disease, H/O echocardiogram (2018), Headache(784.0), History of exercise stress test (2017), Miscarriage, Pain management, Prolonged emergence from general anesthesia, Sleep apnea, and Wears glasses. PSH:   has a past surgical history that includes Dilation and curettage of uterus; Tonsillectomy ();  section; Tooth Extraction; Tubal ligation (); Knee arthroscopy (Right, 2019);  Carpal tunnel release (Left, 10/2019); Carpal tunnel release (Right, 2016); and hernia repair (N/A, 1/9/2020). Allergies: Allergies   Allergen Reactions    Latex Hives    Shellfish-Derived Products      \"break out like tomato and swell - trouble swallowing- with shrimp    Ultram [Tramadol Hcl] Hives        Home Meds:    Prior to Admission medications    Medication Sig Start Date End Date Taking? Authorizing Provider   lisdexamfetamine (VYVANSE) 50 MG capsule Take 50 mg by mouth every morning. Yes Historical Provider, MD   propranolol (INDERAL) 20 MG tablet Take 20 mg by mouth 3 times daily   Yes Historical Provider, MD   dicyclomine (BENTYL) 10 MG capsule Take 2 capsules by mouth 4 times daily as needed (abdominal pain) 2/9/20   Trevor Marie PA-C   ondansetron (ZOFRAN) 4 MG tablet Take 1 tablet by mouth daily as needed for Nausea or Vomiting 1/14/20   Kain Ventura II, MD   acetaminophen (APAP EXTRA STRENGTH) 500 MG tablet Take 1 tablet by mouth every 6 hours as needed for Pain 9/8/19   Mia Zambrano PA-C   albuterol sulfate  (90 Base) MCG/ACT inhaler Inhale 2 puffs into the lungs 4 times daily 4/16/19   Demar Yune MD   Elastic Bandages & Supports (KNEE BRACE/CUSHION/L-XL) MISC Please dispense one knee brace to be worn for 8 hrs during the day and taken off at nighttime for right knee for comfort.  9/5/18   Mai Zambrano PA-C   ibuprofen (ADVIL;MOTRIN) 600 MG tablet Take 1 tablet by mouth every 6 hours as needed for Pain or Fever 2/19/17   MARU Sousa CNP   gabapentin (NEURONTIN) 300 MG capsule Take 300 mg by mouth 3 times daily    Historical Provider, MD        Hospital Meds:    Current Facility-Administered Medications   Medication Dose Route Frequency Provider Last Rate Last Dose    lactated ringers infusion   Intravenous Once Jose Interiano MD          lactated ringers         Social History / Family History:        Social History     Socioeconomic History    Marital status: Single     Spouse name: None    Number of children: None    Years of education: None    Highest education level: None   Occupational History    None   Social Needs    Financial resource strain: None    Food insecurity     Worry: None     Inability: None    Transportation needs     Medical: None     Non-medical: None   Tobacco Use    Smoking status: Current Every Day Smoker     Packs/day: 1.00     Years: 18.00     Pack years: 18.00     Types: Cigarettes     Start date: 2001    Smokeless tobacco: Never Used   Substance and Sexual Activity    Alcohol use: No    Drug use: Not Currently     Types: Marijuana     Comment: Last used: 2005/ per pt on 10/1/2020\"last used cocaine 2019    Sexual activity: Yes     Partners: Male   Lifestyle    Physical activity     Days per week: None     Minutes per session: None    Stress: None   Relationships    Social connections     Talks on phone: None     Gets together: None     Attends [employer] service: None     Active member of club or organization: None     Attends meetings of clubs or organizations: None     Relationship status: None    Intimate partner violence     Fear of current or ex partner: None     Emotionally abused: None     Physically abused: None     Forced sexual activity: None   Other Topics Concern    None   Social History Narrative    None       Review of Systems:  Constitutional: Negative for fever, chills, diaphoresis, appetite change and fatigue. HENT: Negative for sore throat, trouble swallowing and voice change. Respiratory: Negative for cough, positive for shortness of breath no wheezing. Cardiovascular: Negative for chest pain positive for SOB with one flight of stairs exertion, no pitting LE edema. Gastrointestinal: Negative for nausea, vomiting, diarrhea, constipation, blood in stool, abdominal distention, anal bleeding or rectal pain. Musculoskeletal: Negative for joint swelling and arthralgias.    Skin: Warm and dry, well perfused. Neurological: Negative for seizures, syncope, speech difficulty and weakness. Hematological/Lymphatic: Negative for adenopathy. No history of DVT/PE. Does not bruise/bleed easily. Psychiatric/Behavioral: Negative for agitation. Physical Exam:  Vital Signs: There were no vitals filed for this visit. General appearance: Pt Alert and oriented, in no apparent acute distress. HEENT:  ELKIN, EOMI, No JVDs, Bruits, Megalies. Lungs: Clear to auscultation bilaterally. Heart: Regular rate and rhythm, S1, S2 normal, no murmur, rub or gallop. Abdomen: Non tender. Non distended. Positive bowel sounds. No hernias noted, no masses palpable. Extremities: Warm, well perfused, no cyanosis or edema. Skin: Skin color, texture, turgor normal.  Neurologic: Grossly normal, Cranial nerves from II to XII intact. Radiologic / Imaging / TESTING  No results found. Labs:    No results found for this or any previous visit (from the past 24 hour(s)).       Diagnosis:  Patient Active Problem List   Diagnosis    Pregnancy with poor obstetric history    Obesity complicating pregnancy in third trimester    Back pain in pregnancy    Chest pain    Morbid obesity with BMI of 50.0-59.9, adult ([facility]    Mild intermittent asthma without complication    Shortness of breath on exertion    BASHIR (obstructive sleep apnea)    Drug-induced constipation    Diarrhea    Arthritis    Bilateral chronic knee pain    Pain of both hip joints    Chronic bilateral low back pain without sciatica    Episodic tension-type headache, not intractable    Nausea and vomiting in adult    Reactive depression    Anxiety    Panic attack    Secondary hypertension    GOGO (stress urinary incontinence, female)    Ventral hernia without obstruction or gangrene    Incarcerated ventral hernia    S/P ventral herniorrhaphy           Assessment & Plan:    Orestes Johnson is a very pleasant 30 y.o. female who presents today for her preop EGD eval before her bariatric procedure. As noted her Body mass index is 55.47 kg/m². with significant co-morbidities as below. The patient has been complying with the pre-operative workup, lifestyle modifications and changes with the bariatric clinic, including:  Dietitian evaluation and counseling, psychologist evaluation and counseling, Exercise physiologist evaluation and counseling, cardiac preoperative clearance and optimization, pulmonology preoperative clearance and optimization, today will proceed with preoperative EGD for GERD, morbid obesity: Body mass index is 55.47 kg/m². , in preparation for a bariatric procedure; to r/o GERD, Hiatal Hernia, Peptic Ulcer Disease, Gastroparesis, Esophageal dysmotility; etc.    All risks and benefits, potential complications and possible alternatives discussed, and agreeable to proceed.     ____________________________________________    Jannette Garland MD, FACS, FICS    10/5/2020  10:47 AM

## 2020-10-06 LAB — CLOTEST: NEGATIVE

## 2020-11-02 ENCOUNTER — OFFICE VISIT (OUTPATIENT)
Dept: CARDIOLOGY CLINIC | Age: 35
End: 2020-11-02
Payer: COMMERCIAL

## 2020-11-02 VITALS
DIASTOLIC BLOOD PRESSURE: 80 MMHG | HEART RATE: 77 BPM | WEIGHT: 293 LBS | SYSTOLIC BLOOD PRESSURE: 134 MMHG | BODY MASS INDEX: 39.68 KG/M2 | HEIGHT: 72 IN

## 2020-11-02 PROCEDURE — 4004F PT TOBACCO SCREEN RCVD TLK: CPT | Performed by: INTERNAL MEDICINE

## 2020-11-02 PROCEDURE — 99214 OFFICE O/P EST MOD 30 MIN: CPT | Performed by: INTERNAL MEDICINE

## 2020-11-02 PROCEDURE — G8484 FLU IMMUNIZE NO ADMIN: HCPCS | Performed by: INTERNAL MEDICINE

## 2020-11-02 PROCEDURE — G8417 CALC BMI ABV UP PARAM F/U: HCPCS | Performed by: INTERNAL MEDICINE

## 2020-11-02 PROCEDURE — G8428 CUR MEDS NOT DOCUMENT: HCPCS | Performed by: INTERNAL MEDICINE

## 2020-11-02 PROCEDURE — 93000 ELECTROCARDIOGRAM COMPLETE: CPT | Performed by: INTERNAL MEDICINE

## 2020-11-02 NOTE — PROGRESS NOTES
Jaky Vasquez MD        OFFICE  FOLLOWUP NOTE    Chief complaints: patient is here for management of pre op for gastric sleeve, smoking, headache, ADHA, depression    Subjective: patient feels better, no chest pain, no shortness of breath, no dizziness, no palpitations    HPI Sean Boyce is a 30 y. o.year old who  has a past medical history of Anxiety disorder, Arthritis, Asthma, Bipolar disorder ([facility] [address] Depression, Drug addiction ([facility] [address] Family history of heart disease, H/O echocardiogram, Headache(784.0), History of exercise stress test, Miscarriage, Pain management, Prolonged emergence from general anesthesia, Sleep apnea, and Wears glasses. and presents for management of pre op for gastric sleeve, smoking, headache, ADHA, depression   which are well controlled      Current Outpatient Medications   Medication Sig Dispense Refill    hydrOXYzine (VISTARIL) 25 MG capsule       lisdexamfetamine (VYVANSE) 50 MG capsule Take 50 mg by mouth every morning.  albuterol sulfate  (90 Base) MCG/ACT inhaler Inhale 2 puffs into the lungs 4 times daily 1 Inhaler 5    Elastic Bandages & Supports (KNEE BRACE/CUSHION/L-XL) MISC Please dispense one knee brace to be worn for 8 hrs during the day and taken off at nighttime for right knee for comfort. 1 each 0    propranolol (INDERAL) 20 MG tablet Take 20 mg by mouth 3 times daily       No current facility-administered medications for this visit. Allergies: Latex; Shellfish-derived products; and Ultram [tramadol hcl]  Past Medical History:   Diagnosis Date    Anxiety disorder     Arthritis     Asthma     Follows with [facility]    Bipolar disorder ([facility] [address]     Not on meds as of 12/3/19    Depression     currently not on meds (12/3/19)    Drug addiction ([facility] [address]     states previous addiction to vicodin (2011), denies current use.    Hx + UDS for cocaine, 12/3/2019    Family history of heart disease     \"had to see Dr Oma Oakley and he said every thing ok- [place]      [unfilled]  Review of Systems:   · Constitutional: No Fever or Weight Loss   · Eyes: No Decreased Vision  · ENT: No Headaches, Hearing Loss or Vertigo  · Cardiovascular: No chest pain, dyspnea on exertion, palpitations or loss of consciousness  · Respiratory: No cough or wheezing    · Gastrointestinal: No abdominal pain, appetite loss, blood in stools, constipation, diarrhea or heartburn  · Genitourinary: No dysuria, trouble voiding, or hematuria  · Musculoskeletal:  No gait disturbance, weakness or joint complaints  · Integumentary: No rash or pruritis  · Neurological: No TIA or stroke symptoms  · Psychiatric: No anxiety or depression  · Endocrine: No malaise, fatigue or temperature intolerance  · Hematologic/Lymphatic: No bleeding problems, blood clots or swollen lymph nodes  · Allergic/Immunologic: No nasal congestion or hives  All systems negative except as marked. Objective:  /80   Pulse 77   Ht 6' (1.829 m)   Wt (!) 403 lb (182.8 kg)   BMI 54.66 kg/m²   Wt Readings from Last 3 Encounters:   11/02/20 (!) 403 lb (182.8 kg)   10/14/20 (!) 407 lb (184.6 kg)   10/05/20 (!) 409 lb (185.5 kg)     Body mass index is 54.66 kg/m². GENERAL - Alert, oriented, pleasant, in no apparent distress,normal grooming  HEENT - pupils are reactive to light and accomodation, cornea intact, conjunctive normal color, ears are normal in exam,throat exam in normal, teeth, gum and palate are normal, oral mucosa is normal without any notation of pallor or cyanosis  Neck - Supple. No jugular venous distention noted. No carotid bruits, no apical -carotid delay  Respiratory:  Normal breath sounds, No respiratory distress, No wheezing, No chest tenderness. ,no use of accessory muscles, diaphragm movement is normal  Cardiovascular: (PMI) apex non displaced,no lifts no thrills, no s3,no s4, Normal heart rate, Normal rhythm, No murmurs, No rubs, No gallops.  Carotid arteries pulse and amplitude are normal no bruit, no abdominal bruit noted ( normal abdominal aorta ausculation), femoral arteries pulse and amplitude are normal no bruit, pedal pulses are normal  Femoral pulses have normal amplitude, no bruits   Extremities - No cyanosis, clubbing, or significant edema, no varicose veins    Abdomen - No masses, tenderness, or organomegaly, no hepato-splenomegally, no bruits  Musculoskeletal - No significant edema, no kyphosis or scoliosis, no deformity in any extremity noted, muscle strength and tone are normal  Skin: no ulcer,no scar,no stasis dermatitis   Neurologic - alert oriented times 3,Cranial nerves II through XII are grossly intact. There were no gross focal neurologic abnormalities. All sensory and motor nerves examined and were normal  Psychiatric: normal mood and affect    Lab Results   Component Value Date    CKTOTAL 112 04/28/2017    TROPONINI <0.006 11/14/2013     BNP:  No results found for: BNP  PT/INR:  No results found for: PTINR  Lab Results   Component Value Date    LABA1C 5.2 07/30/2013     Lab Results   Component Value Date    CHOL 180 07/30/2013    TRIG 101 07/30/2013    HDL 44 (L) 07/30/2013    LDLDIRECT 133 (H) 07/30/2013     Lab Results   Component Value Date    ALT 19 02/09/2020    AST 22 02/09/2020     TSH:  No results found for: TSH    Impression:  Sulema Jones is a 30 y. o.year old who  has a past medical history of Anxiety disorder, Arthritis, Asthma, Bipolar disorder ([address] Depression, Drug addiction ([facility] [address] Family history of heart disease, H/O echocardiogram, Headache(784.0), History of exercise stress test, Miscarriage, Pain management, Prolonged emergence from general anesthesia, Sleep apnea, and Wears glasses. and presents with     Plan:  1. preop for gastric sleeve: cleared on from cardiac stand point   2. Headache\"  patient is on percocet, neurontin. Patient is off fioricet  3. ADHD:off adderal  4. Depression and axiety: recommend psychiatrist evaluation.   5. Health maintenance: exerise and diet  All labs, medications and tests reviewed, continue all other medications of all above medical condition listed as is.     @[facility] [email or web address]@

## 2020-11-02 NOTE — LETTER
Tennilel Kimbrough  1985  [id number]    Have you had any Chest Pain - Yes  If Yes DO EKG - How does it feel - Tightness with anxiety   How long does the pain last - minutes   How long have you been having the pain - Years     Have you had any Shortness of Breath - Yes  If Yes - When on exertion    Have you had any dizziness - No    Have you had any palpitations - Yes    How long does it last - seconds     Is the patient on any of the following medications -    Do you have any edema - swelling in no      Check Venous \"LEG PROBLEM Questionnaire\"    Do you have a surgery or procedure scheduled in the near future - Yes  If Yes- DO EKG  If Yes - Who is the surgery with?  [facility]  Phone number of physician   Fax number of physician   Type of surgery weight loss  BE SURE TO GIVE THIS INFORMATION to [facility]    Ask patient if they want to sign up for MyChart if they are not already signed up    Check to see if we have an E-MAIL on file for the patient    Check medication list thoroughly!!!  BE SURE TO ASK PATIENT IF THEY NEED MEDICATION REFILLS    At check out add to every patient's \"wrap up\" the following dot phrase AFTERHOURSEDUCATION and ensure we explain this to our patients

## 2020-11-05 ENCOUNTER — TELEPHONE (OUTPATIENT)
Dept: BARIATRICS/WEIGHT MGMT | Age: 35
End: 2020-11-05

## 2020-11-05 NOTE — TELEPHONE ENCOUNTER
Spoke with pt today regarding progress in program. Pt was just cleared by cardiology. Has not been cleared by pulmonology. Pt reports she does not have money to pay for CPAP supplies so plans to repeat sleep study. Did PFT's yesterday. Still smoking. Discussed with pt that she has been in the program for 2 years and has not met criteria for surgery. Emphasized that even if cleared, she needs to show progress with weight loss, quit smoking and make her scheduled visits in order to progress to surgical approval for our program. When asked if she was motivated and working on these items, she said she's \"working on it. \" Pt requested to discuss at a later time b/c she had just woken up. Discussed with pt that we would continue to evaluate her behavioral change, which we must see regardless of other clearances. Will continue to follow.   Kesha Craft MS, RDN, LD  11/5/2020

## 2020-11-12 ENCOUNTER — OFFICE VISIT (OUTPATIENT)
Dept: BARIATRICS/WEIGHT MGMT | Age: 35
End: 2020-11-12
Payer: COMMERCIAL

## 2020-11-12 VITALS
SYSTOLIC BLOOD PRESSURE: 118 MMHG | BODY MASS INDEX: 41.02 KG/M2 | HEIGHT: 71 IN | WEIGHT: 293 LBS | TEMPERATURE: 97.5 F | HEART RATE: 76 BPM | DIASTOLIC BLOOD PRESSURE: 68 MMHG

## 2020-11-12 PROCEDURE — G8484 FLU IMMUNIZE NO ADMIN: HCPCS | Performed by: NURSE PRACTITIONER

## 2020-11-12 PROCEDURE — 99214 OFFICE O/P EST MOD 30 MIN: CPT | Performed by: NURSE PRACTITIONER

## 2020-11-12 PROCEDURE — 4004F PT TOBACCO SCREEN RCVD TLK: CPT | Performed by: NURSE PRACTITIONER

## 2020-11-12 PROCEDURE — G8427 DOCREV CUR MEDS BY ELIG CLIN: HCPCS | Performed by: NURSE PRACTITIONER

## 2020-11-12 PROCEDURE — G8417 CALC BMI ABV UP PARAM F/U: HCPCS | Performed by: NURSE PRACTITIONER

## 2020-11-12 RX ORDER — BUTALBITAL, ACETAMINOPHEN AND CAFFEINE 50; 325; 40 MG/1; MG/1; MG/1
1 TABLET ORAL EVERY 4 HOURS PRN
COMMUNITY
End: 2021-05-12 | Stop reason: ALTCHOICE

## 2020-11-12 ASSESSMENT — ENCOUNTER SYMPTOMS
TROUBLE SWALLOWING: 0
WHEEZING: 0
RHINORRHEA: 0
EYE PAIN: 0
DIARRHEA: 0
NAUSEA: 0
ABDOMINAL PAIN: 0
SHORTNESS OF BREATH: 0
CHEST TIGHTNESS: 0
COUGH: 1
ABDOMINAL DISTENTION: 0

## 2020-11-12 NOTE — PROGRESS NOTES
2 yrs ago \"    H/O echocardiogram 2018    Technically difficult examination due to body habitus. Left ventricular function is low normal, EF is estimated at 45-50%. Mild concentric left ventricular hypertrophy. Mildly dilated left atrium. Right ventricular systolic pressure of 29 mm Hg. Mild tricuspid regurgitation, no evidence of any pericardial effusion.      MSKURGOA(402.0)     Last headache:  19    History of exercise stress test 2017    treadmill    Miscarriage     x4    Pain management     Was Dr. Melyssa Carvajal - left his practice 2019    Prolonged emergence from general anesthesia     \"with last surgery had trouble keeping me awake and ended up admitted me- and oxygen level kept going down    Sleep apnea     sleep study 2019- has cpap does not wear daily    Wears glasses       Patient Active Problem List   Diagnosis    Pregnancy with poor obstetric history    Obesity complicating pregnancy in third trimester    Back pain in pregnancy    Chest pain    Morbid obesity with BMI of 50.0-59.9, adult ([address]    Mild intermittent asthma without complication    Shortness of breath on exertion    BASHIR (obstructive sleep apnea)    Drug-induced constipation    Diarrhea    Arthritis    Bilateral chronic knee pain    Pain of both hip joints    Chronic bilateral low back pain without sciatica    Episodic tension-type headache, not intractable    Nausea and vomiting in adult    Reactive depression    Anxiety    Panic attack    Secondary hypertension    GOGO (stress urinary incontinence, female)    Ventral hernia without obstruction or gangrene    Incarcerated ventral hernia    S/P ventral herniorrhaphy     Past Surgical History:   Procedure Laterality Date    CARPAL TUNNEL RELEASE Left 10/2019    Dr. Denver Hakim Right 2016     SECTION       &    [id number] [facility] OF UTERUS       &     HERNIA REPAIR N/A 2020    HERNIA SUPRAUMBILICAL/ VENTRAL REPAIR LAPAROSCOPIC ROBOTIC WITH MESH performed by Joan Liu MD at [facility] ARTHROSCOPY Right 07/2019    Dr. Moore Man      Several on bottom & top - no partials    TUBAL LIGATION  2014    UPPER GASTROINTESTINAL ENDOSCOPY N/A 10/5/2020    EGD BIOPSY performed by Joan Liu MD at [facility] ENDOSCOPY        Current Outpatient Medications   Medication Sig Dispense Refill    butalbital-acetaminophen-caffeine (FIORICET, ESGIC) -40 MG per tablet Take 1 tablet by mouth every 4 hours as needed for Headaches      fluticasone-vilanterol (BREO ELLIPTA) 100-25 MCG/INH AEPB inhaler Inhale 1 puff into the lungs daily 1 each 3    guaiFENesin (MUCINEX) 600 MG extended release tablet Take 1 tablet by mouth 2 times daily 60 tablet 5    hydrOXYzine (VISTARIL) 25 MG capsule       lisdexamfetamine (VYVANSE) 50 MG capsule Take 50 mg by mouth every morning.  propranolol (INDERAL) 20 MG tablet Take 20 mg by mouth 3 times daily      albuterol sulfate  (90 Base) MCG/ACT inhaler Inhale 2 puffs into the lungs 4 times daily 1 Inhaler 5    Elastic Bandages & Supports (KNEE BRACE/CUSHION/L-XL) MISC Please dispense one knee brace to be worn for 8 hrs during the day and taken off at nighttime for right knee for comfort. 1 each 0     No current facility-administered medications for this visit. Allergies   Allergen Reactions    Latex Hives    Shellfish-Derived Products      \"break out like tomato and swell - trouble swallowing- with shrimp    Ultram [Tramadol Hcl] Hives         Review of Systems   Constitutional: Negative. Negative for appetite change, fatigue and fever. HENT: Negative for congestion, dental problem, hearing loss, rhinorrhea and trouble swallowing. Eyes: Negative for pain. Respiratory: Positive for cough. Negative for chest tightness, shortness of breath and wheezing.     Cardiovascular: Negative for chest pain, palpitations and leg swelling. Gastrointestinal: Negative for abdominal distention, abdominal pain, diarrhea and nausea. Endocrine: Negative for cold intolerance and polydipsia. Genitourinary: Negative for difficulty urinating and frequency. Musculoskeletal: Negative for arthralgias and gait problem. Skin: Negative for rash. Allergic/Immunologic: Negative for environmental allergies. Neurological: Negative for dizziness, seizures and syncope. Hematological: Does not bruise/bleed easily. Psychiatric/Behavioral: Negative for behavioral problems and suicidal ideas. OBJECTIVE:    /68   Pulse 76   Temp 97.5 °F (36.4 °C) (Infrared)   Ht 5' 11\" (1.803 m)   Wt (!) 401 lb 12.8 oz (182.3 kg)   BMI 56.04 kg/m²       Physical Exam  Vitals signs and nursing note reviewed. Constitutional:       Appearance: She is well-developed. Comments: Obese   HENT:      Head: Normocephalic and atraumatic. Right Ear: Hearing and ear canal normal.      Left Ear: Hearing and ear canal normal.      Nose: Nose normal.      Mouth/Throat:      Pharynx: Uvula midline. Eyes:      Conjunctiva/sclera: Conjunctivae normal.      Pupils: Pupils are equal, round, and reactive to light. Neck:      Musculoskeletal: Normal range of motion. Cardiovascular:      Rate and Rhythm: Normal rate and regular rhythm. Heart sounds: Normal heart sounds. Pulmonary:      Effort: Pulmonary effort is normal.      Breath sounds: Normal breath sounds. No decreased breath sounds, wheezing, rhonchi or rales. Abdominal:      General: Bowel sounds are normal.      Palpations: Abdomen is soft. Tenderness: There is no abdominal tenderness. Musculoskeletal: Normal range of motion. General: No tenderness. Comments: In all 4 extremities. Skin:     General: Skin is warm and dry. Findings: No rash. Neurological:      Mental Status: She is alert and oriented to person, place, and time. GCS: GCS eye subscore is 4. GCS verbal subscore is 5. GCS motor subscore is 6. Motor: No abnormal muscle tone. Psychiatric:         Behavior: Behavior normal.         Judgment: Judgment normal.               Small intestinal metaplasia, dysplasia, and H. pylori are   not seen.       Reviewed in detail. ASSESSMENT & PLAN:    1. Morbid obesity with BMI of 50.0-59.9, adult (HCC)  - States down -8 lbs, first in person weight. - needs to cut out pop completed. - Discussed compliance and diet changes/clearancs. - Psych # given. - Refax PT.   - Cardiac cleared. - needs labs and PT as well as psych.   - Reviewed EGD/path. - RTC 1 month with NP, must be in person. 2. Pre-op evaluation  - Continue working on pre op clearances. Discussed in length complying with the dietary recommendations, complying with the preoperative workup including dietary counseling completed, exercise physiologist counseling needs completed, andpre-operative optimization of pulmonologist in process and cardiologist needs completed. The patient expressed understanding and willingness to comply nicely; all questions and concerns addressed. No orders of the defined types were placed in this encounter. No orders of the defined types were placed in this encounter. Follow Up:  Return in about 1 month (around 12/12/2020) for Weight Check.     Boubacar Mantilla, CNP

## 2020-12-16 ENCOUNTER — TELEPHONE (OUTPATIENT)
Dept: BARIATRICS/WEIGHT MGMT | Age: 35
End: 2020-12-16

## 2020-12-16 NOTE — TELEPHONE ENCOUNTER
Called patient advised that due to no show policy and [id number]/2553 being her 4th no show she must wait until march to reschedule.  Patient advised to give us a call in March and she can restart the program

## 2021-03-09 ENCOUNTER — APPOINTMENT (OUTPATIENT)
Dept: GENERAL RADIOLOGY | Age: 36
End: 2021-03-09
Payer: COMMERCIAL

## 2021-03-09 ENCOUNTER — APPOINTMENT (OUTPATIENT)
Dept: CT IMAGING | Age: 36
End: 2021-03-09
Payer: COMMERCIAL

## 2021-03-09 ENCOUNTER — HOSPITAL ENCOUNTER (EMERGENCY)
Age: 36
Discharge: HOME | End: 2021-03-09
Attending: EMERGENCY MEDICINE
Payer: COMMERCIAL

## 2021-03-09 ENCOUNTER — HOSPITAL ENCOUNTER (EMERGENCY)
Age: 36
Discharge: LEFT BEFORE DISCHARGE | End: 2021-03-09
Payer: COMMERCIAL

## 2021-03-09 VITALS
RESPIRATION RATE: 20 BRPM | DIASTOLIC BLOOD PRESSURE: 76 MMHG | HEIGHT: 71 IN | HEART RATE: 87 BPM | BODY MASS INDEX: 41.02 KG/M2 | OXYGEN SATURATION: 100 % | SYSTOLIC BLOOD PRESSURE: 141 MMHG | TEMPERATURE: 97.8 F | WEIGHT: 293 LBS

## 2021-03-09 VITALS
DIASTOLIC BLOOD PRESSURE: 60 MMHG | HEIGHT: 72 IN | OXYGEN SATURATION: 99 % | BODY MASS INDEX: 39.68 KG/M2 | RESPIRATION RATE: 18 BRPM | SYSTOLIC BLOOD PRESSURE: 124 MMHG | WEIGHT: 293 LBS | TEMPERATURE: 97.3 F | HEART RATE: 83 BPM

## 2021-03-09 DIAGNOSIS — S93.492A SPRAIN OF ANTERIOR TALOFIBULAR LIGAMENT OF LEFT ANKLE, INITIAL ENCOUNTER: ICD-10-CM
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
## 2021-03-09 DIAGNOSIS — S16.1XXA STRAIN OF NECK MUSCLE, INITIAL ENCOUNTER: ICD-10-CM

## 2021-03-09 PROCEDURE — 93005 ELECTROCARDIOGRAM TRACING: CPT | Performed by: EMERGENCY MEDICINE

## 2021-03-09 PROCEDURE — 70450 CT HEAD/BRAIN W/O DYE: CPT

## 2021-03-09 PROCEDURE — 73590 X-RAY EXAM OF LOWER LEG: CPT

## 2021-03-09 PROCEDURE — 99283 EMERGENCY DEPT VISIT LOW MDM: CPT

## 2021-03-09 PROCEDURE — 6360000002 HC RX W HCPCS: Performed by: EMERGENCY MEDICINE

## 2021-03-09 PROCEDURE — 72125 CT NECK SPINE W/O DYE: CPT

## 2021-03-09 PROCEDURE — 6370000000 HC RX 637 (ALT 250 FOR IP): Performed by: EMERGENCY MEDICINE

## 2021-03-09 PROCEDURE — 73610 X-RAY EXAM OF ANKLE: CPT

## 2021-03-09 RX ORDER — BACLOFEN 10 MG/1
10 TABLET ORAL 3 TIMES DAILY
Qty: 30 TABLET | Refills: 0 | Status: SHIPPED | OUTPATIENT
Start: 2021-03-09 | End: 2022-03-11 | Stop reason: ALTCHOICE

## 2021-03-09 RX ORDER — CYCLOBENZAPRINE HCL 10 MG
10 TABLET ORAL ONCE
Status: COMPLETED | OUTPATIENT
Start: 2021-03-09 | End: 2021-03-09

## 2021-03-09 RX ORDER — IBUPROFEN 600 MG/1
600 TABLET ORAL EVERY 6 HOURS PRN
Qty: 28 TABLET | Refills: 0 | Status: SHIPPED | OUTPATIENT
Start: 2021-03-09 | End: 2022-03-14

## 2021-03-09 RX ORDER — KETOROLAC TROMETHAMINE 30 MG/ML
60 INJECTION, SOLUTION INTRAMUSCULAR; INTRAVENOUS ONCE
Status: COMPLETED | OUTPATIENT
Start: 2021-03-09 | End: 2021-03-09

## 2021-03-09 RX ORDER — LIDOCAINE 50 MG/G
1 PATCH TOPICAL DAILY
Qty: 30 PATCH | Refills: 0 | Status: SHIPPED | OUTPATIENT
Start: 2021-03-09 | End: 2021-04-08

## 2021-03-09 RX ORDER — ACETAMINOPHEN 500 MG
500 TABLET ORAL EVERY 6 HOURS PRN
Qty: 28 TABLET | Refills: 0 | Status: SHIPPED | OUTPATIENT
Start: 2021-03-09 | End: 2021-06-23 | Stop reason: ALTCHOICE

## 2021-03-09 RX ADMIN — KETOROLAC TROMETHAMINE 60 MG: 30 INJECTION, SOLUTION INTRAMUSCULAR; INTRAVENOUS at 21:16

## 2021-03-09 RX ADMIN — CYCLOBENZAPRINE 10 MG: 10 TABLET, FILM COATED ORAL at 21:15

## 2021-03-09 ASSESSMENT — PAIN SCALES - GENERAL: PAINLEVEL_OUTOF10: 10

## 2021-03-09 ASSESSMENT — PAIN DESCRIPTION - DESCRIPTORS: DESCRIPTORS: ACHING

## 2021-03-09 ASSESSMENT — PAIN DESCRIPTION - LOCATION
LOCATION: ANKLE
LOCATION: ANKLE

## 2021-03-09 ASSESSMENT — PAIN DESCRIPTION - PAIN TYPE: TYPE: ACUTE PAIN

## 2021-03-09 ASSESSMENT — PAIN DESCRIPTION - ORIENTATION: ORIENTATION: LEFT

## 2021-03-09 NOTE — ED NOTES
Ice pack applied to left ankle and pt placed up on chair to elevate as much as possible.       Jenn Espinoza, CATRACHITO  03/09/21 1820

## 2021-03-10 PROCEDURE — 93010 ELECTROCARDIOGRAM REPORT: CPT | Performed by: INTERNAL MEDICINE

## 2021-03-10 ASSESSMENT — ENCOUNTER SYMPTOMS
SINUS PAIN: 0
COLOR CHANGE: 0
COUGH: 0
SHORTNESS OF BREATH: 0
FACIAL SWELLING: 0
WHEEZING: 0
SORE THROAT: 0
RESPIRATORY NEGATIVE: 1
RHINORRHEA: 0
CHEST TIGHTNESS: 0
GASTROINTESTINAL NEGATIVE: 1
EYE PAIN: 0
NAUSEA: 0
SINUS PRESSURE: 0
VOMITING: 0
ABDOMINAL PAIN: 0
BACK PAIN: 0
EYES NEGATIVE: 1

## 2021-03-10 NOTE — ED NOTES
Patient medicated according to [facility] and air cast placed of left foot.  Patient tolerated well      Isabella Haley RN  03/09/21 [id number]

## 2021-03-10 NOTE — ED TRIAGE NOTES
The patient presents to the ED aftre a fall with LOC. She has a c/o L ankle pain and head pain at 10/10.

## 2021-03-10 NOTE — PROGRESS NOTES
AVS reviewed with patient all questions and concerns addressed Patient instructed on R.I.C.E. and new RX medications. Patient discharged home with all belongings and her mother to drive.  Patient to follow up with family medicine for further evaluation if needed

## 2021-03-11 LAB
EKG ATRIAL RATE: 79 BPM
EKG DIAGNOSIS: NORMAL
EKG P AXIS: 20 DEGREES
EKG P-R INTERVAL: 180 MS
EKG Q-T INTERVAL: 374 MS
EKG QRS DURATION: 86 MS
EKG QTC CALCULATION (BAZETT): 428 MS
EKG R AXIS: 28 DEGREES
EKG T AXIS: 56 DEGREES
EKG VENTRICULAR RATE: 79 BPM

## 2021-03-11 NOTE — ED PROVIDER NOTES
[id number]  [address]      Pt Name: Allison Cottrell  MRN: [id number]  [place] 1985  Date of evaluation: 3/9/2021  Provider: Yovanny Peterson DO    CHIEF COMPLAINT       Chief Complaint   Patient presents with    Fall    Ankle Pain     L ankle    Head Injury         HISTORY OF PRESENT ILLNESS      Allison Cottrell is a 30 y.o. female who presents to the emergency department  for   Chief Complaint   Patient presents with    Fall    Ankle Pain     L ankle    Head Injury       29-year-old female presents emergency department status post mechanical fall while at work. Patient reports left ankle pain, right leg pain in the tibia and fibula area. Patient also reports loss of consciousness after head trauma and neck pain. Patient reports pain is worsened with movement. Patient is morbidly obese. Patient reports that she is unable to put full weight on her left ankle but is able to place some weight. Patient's BMI is 55. Patient denies abdominal pain, chest pain, nausea, vomiting. Patient denies pain to the upper extremities. Patient denies neurologic deficits at this time. The history is provided by the patient and medical records. No  was used. Nursing Notes, Triage Notes & Vital Signs were reviewed. REVIEW OF SYSTEMS    (2-9 systems for level 4, 10 or more for level 5)     Review of Systems   Constitutional: Negative. Negative for chills, fatigue and fever. HENT: Negative. Negative for congestion, dental problem, facial swelling, nosebleeds, postnasal drip, rhinorrhea, sinus pressure, sinus pain and sore throat. Eyes: Negative. Negative for pain and visual disturbance. Respiratory: Negative. Negative for cough, chest tightness, shortness of breath and wheezing. Cardiovascular: Negative. Negative for chest pain and palpitations. Gastrointestinal: Negative. Negative for abdominal pain, nausea and vomiting. Prior to Admission medications    Medication Sig Start Date End Date Taking? Authorizing Provider   acetaminophen (TYLENOL) 500 MG tablet Take 1 tablet by mouth every 6 hours as needed for Pain 3/9/21 3/16/21 Yes Savilla Sherif, DO   ibuprofen (ADVIL;MOTRIN) 600 MG tablet Take 1 tablet by mouth every 6 hours as needed for Pain 3/9/21 3/16/21 Yes Savilla Sherif, DO   baclofen (LIORESAL) 10 MG tablet Take 1 tablet by mouth 3 times daily 3/9/21  Yes Savilla Sherfi, DO   lidocaine (LIDODERM) 5 % Place 1 patch onto the skin daily 12 hours on, 12 hours off. 3/9/21 4/8/21 Yes Kirkilla Sherif, DO   hydrOXYzine (VISTARIL) 25 MG capsule  9/30/20  Yes Historical Provider, MD   lisdexamfetamine (VYVANSE) 50 MG capsule Take 50 mg by mouth every morning. Yes Historical Provider, MD   propranolol (INDERAL) 20 MG tablet Take 20 mg by mouth 3 times daily   Yes Historical Provider, MD   albuterol sulfate  (90 Base) MCG/ACT inhaler Inhale 2 puffs into the lungs 4 times daily 4/16/19  Yes Elinor Guthrie MD   butalbital-acetaminophen-caffeine (FIORICET, ESGIC) -40 MG per tablet Take 1 tablet by mouth every 4 hours as needed for Headaches    Historical Provider, MD   fluticasone-vilanterol (BREO ELLIPTA) 100-25 MCG/INH AEPB inhaler Inhale 1 puff into the lungs daily 11/3/20   Elinor Guthrie MD   guaiFENesin (MUCINEX) 600 MG extended release tablet Take 1 tablet by mouth 2 times daily 11/3/20   Moin Clair Epley, MD   Elastic Bandages & Supports (KNEE BRACE/CUSHION/L-XL) MISC Please dispense one knee brace to be worn for 8 hrs during the day and taken off at nighttime for right knee for comfort.  9/5/18   Nba Hand PA-C        Patient Active Problem List   Diagnosis    Pregnancy with poor obstetric history    Obesity complicating pregnancy in third trimester    Back pain in pregnancy    Chest pain    Morbid obesity with BMI of 50.0-59.9, adult ([address]    Mild intermittent asthma without complication    Shortness of breath on exertion    BASHIR (obstructive sleep apnea)    Drug-induced constipation    Diarrhea    Arthritis    Bilateral chronic knee pain    Pain of both hip joints    Chronic bilateral low back pain without sciatica    Episodic tension-type headache, not intractable    Nausea and vomiting in adult    Reactive depression    Anxiety    Panic attack    Secondary hypertension    GOGO (stress urinary incontinence, female)    Ventral hernia without obstruction or gangrene    Incarcerated ventral hernia    S/P ventral herniorrhaphy         SURGICAL HISTORY       Past Surgical History:   Procedure Laterality Date    CARPAL TUNNEL RELEASE Left 10/2019    Dr. Mariann Watson Right 2016     SECTION       &     DILATION AND CURETTAGE OF UTERUS       &     HERNIA REPAIR N/A 2020    HERNIA SUPRAUMBILICAL/ VENTRAL REPAIR LAPAROSCOPIC ROBOTIC WITH MESH performed by Yaritza Brizuela MD at [facility] ARTHROSCOPY Right 2019    Dr. Lomeli Lay      Several on bottom & top - no partials    TUBAL LIGATION      UPPER GASTROINTESTINAL ENDOSCOPY N/A 10/5/2020    EGD BIOPSY performed by Yaritza Brizuela MD at [address] [place] [address]       Discharge Medication List as of 3/9/2021  9:23 PM      CONTINUE these medications which have NOT CHANGED    Details   hydrOXYzine (VISTARIL) 25 MG capsule Historical Med      lisdexamfetamine (VYVANSE) 50 MG capsule Take 50 mg by mouth every morning. Historical Med      propranolol (INDERAL) 20 MG tablet Take 20 mg by mouth 3 times dailyHistorical Med      albuterol sulfate  (90 Base) MCG/ACT inhaler Inhale 2 puffs into the lungs 4 times daily, Disp-1 Inhaler, R-5Normal      butalbital-acetaminophen-caffeine (FIORICET, ESGIC) -40 MG per tablet Take 1 tablet by mouth every 4 hours as needed for HeadachesHistorical Med fluticasone-vilanterol (BREO ELLIPTA) 100-25 MCG/INH AEPB inhaler Inhale 1 puff into the lungs daily, Disp-1 each,R-3Normal      guaiFENesin (MUCINEX) 600 MG extended release tablet Take 1 tablet by mouth 2 times daily, Disp-60 tablet,R-5Normal      Elastic Bandages & Supports (KNEE BRACE/CUSHION/L-XL) MISC Please dispense one knee brace to be worn for 8 hrs during the day and taken off at nighttime for right knee for comfort., Disp-1 each, R-0Print             ALLERGIES     Latex, Shellfish-derived products, and Ultram [tramadol hcl]    FAMILY HISTORY       Family History   Problem Relation Age of Onset    Diabetes Mother     High Blood Pressure Mother     Heart Disease Mother     Heart Disease Maternal Grandmother     Heart Disease Maternal Grandfather           SOCIAL HISTORY       Social History     Socioeconomic History    Marital status: Single     Spouse name: None    Number of children: None    Years of education: None    Highest education level: None   Occupational History    None   Social Needs    Financial resource strain: None    Food insecurity     Worry: None     Inability: None    Transportation needs     Medical: None     Non-medical: None   Tobacco Use    Smoking status: Current Every Day Smoker     Packs/day: 1.00     Years: 18.00     Pack years: 18.00     Types: Cigarettes     Start date: 2001    Smokeless tobacco: Never Used   Substance and Sexual Activity    Alcohol use: No    Drug use: Not Currently     Types: Marijuana     Comment: Last used: 2005/ per pt on 10/1/2020\"last used cocaine 2019    Sexual activity: Yes     Partners: Male   Lifestyle    Physical activity     Days per week: None     Minutes per session: None    Stress: None   Relationships    Social connections     Talks on phone: None     Gets together: None     Attends Holiness service: None     Active member of club or organization: None     Attends meetings of clubs or organizations: None Relationship status: None    Intimate partner violence     Fear of current or ex partner: None     Emotionally abused: None     Physically abused: None     Forced sexual activity: None   Other Topics Concern    None   Social History Narrative    None       SCREENINGS    Karl Coma Scale  Eye Opening: Spontaneous  Best Verbal Response: Oriented  Best Motor Response: Obeys commands  Karl Coma Scale Score: 15          PHYSICAL EXAM    (up to 7 for level 4, 8 or more for level 5)     ED Triage Vitals [03/09/21 1927]   BP Temp Temp Source Pulse Resp SpO2 Height Weight   124/60 97.3 °F (36.3 °C) Infrared 83 18 99 % 6' (1.829 m) (!) 400 lb (181.4 kg)       Physical Exam  Vitals signs and nursing note reviewed. Constitutional:       General: She is not in acute distress. Appearance: She is well-developed. She is not diaphoretic. HENT:      Head: Normocephalic and atraumatic. Right Ear: External ear normal.      Left Ear: External ear normal.      Nose: Nose normal.      Mouth/Throat:      Pharynx: No oropharyngeal exudate. Eyes:      General: No scleral icterus. Right eye: No discharge. Left eye: No discharge. Pupils: Pupils are equal, round, and reactive to light. Neck:      Musculoskeletal: Normal range of motion and neck supple. Muscular tenderness present. No neck rigidity. Thyroid: No thyromegaly. Vascular: No JVD. Trachea: No tracheal deviation. Cardiovascular:      Rate and Rhythm: Normal rate and regular rhythm. Heart sounds: Normal heart sounds. No murmur. No friction rub. No gallop. Pulmonary:      Effort: Pulmonary effort is normal. No respiratory distress. Breath sounds: Normal breath sounds. No stridor. No wheezing. Chest:      Chest wall: No tenderness. Abdominal:      General: Bowel sounds are normal. There is no distension. Palpations: Abdomen is soft. There is no mass. Tenderness: There is no abdominal tenderness.  There is no guarding or rebound. Musculoskeletal:         General: Swelling, tenderness and signs of injury present. No deformity. Left ankle: She exhibits decreased range of motion and swelling. She exhibits no deformity. Cervical back: She exhibits decreased range of motion, tenderness, pain and spasm. She exhibits no bony tenderness, no swelling, no edema, no deformity and no laceration. Right lower leg: She exhibits tenderness and swelling. She exhibits no bony tenderness, no deformity and no laceration. No edema. Lymphadenopathy:      Cervical: No cervical adenopathy. Skin:     General: Skin is warm. Capillary Refill: Capillary refill takes less than 2 seconds. Coloration: Skin is not pale. Findings: No erythema or rash. Neurological:      General: No focal deficit present. Mental Status: She is alert and oriented to person, place, and time. Mental status is at baseline. Cranial Nerves: No cranial nerve deficit. Sensory: No sensory deficit. Motor: No weakness or abnormal muscle tone. Coordination: Coordination normal.      Gait: Gait abnormal.      Deep Tendon Reflexes: Reflexes normal.   Psychiatric:         Behavior: Behavior normal.         Thought Content: Thought content normal.         Judgment: Judgment normal.         DIAGNOSTIC RESULTS     Labs Reviewed - No data to display       EKG:  All EKG's are interpreted by the Emergency Department Physician who either signs or Co-signs this chart in the absence of a cardiologist.       EKG Interpretation    Interpreted by emergency department physician    Rhythm: normal sinus   Rate: normal  Axis: normal  Ectopy: none  Conduction: normal  ST Segments: no acute change  T Waves: no acute change  Q Waves: none    Clinical Impression: no acute changes    Mackenzie Guzmán     RADIOLOGY:     Non-plain film images such as CT, Ultrasound and MRI are read by the radiologist. Plain radiographic images are visualized and preliminarily interpreted by the emergency physician. Interpretation per the Radiologist below, if available at the time of this note:    CT Head WO Contrast   Final Result   No acute intracranial abnormality. CT Cervical Spine WO Contrast   Final Result   No acute abnormality of the cervical spine. XR TIBIA FIBULA RIGHT (2 VIEWS)   Final Result   No acute osseous abnormality of the right tibia or fibula. ED BEDSIDE ULTRASOUND:   Performed by ED Physician Faid Alba DO       LABS:  Labs Reviewed - No data to display    All other labs were within normal range or not returned as of this dictation. EMERGENCY DEPARTMENT COURSE and DIFFERENTIAL DIAGNOSIS/MDM:   Vitals:    Vitals:    03/09/21 1927   BP: 124/60   Pulse: 83   Resp: 18   Temp: 97.3 °F (36.3 °C)   TempSrc: Infrared   SpO2: 99%   Weight: (!) 400 lb (181.4 kg)   Height: 6' (1.829 m)           MDM  Number of Diagnoses or Management Options  Fall, initial encounter  Sprain of anterior talofibular ligament of left ankle, initial encounter  Strain of neck muscle, initial encounter  Diagnosis management comments: 71-year-old female presents emergency department status post mechanical fall. Patient reports left ankle pain, right calf pain, neck pain and does report loss of consciousness with the fall. Patient is morbidly obese. There is no tenderness on examination of the chest, abdomen, upper extremities, though exam is limited due to body habitus. CT of the head and C-spine are negative. X-rays the left ankle are negative. X-rays of the right tib-fib are negative. Patient has no tenderness to palpation whatsoever of the knees or hips. Patient has no focal neurologic deficits whatsoever. Will discharge patient to home with medications for headache, NSAIDs, muscle relaxers. Patient was given work excuse. Return precautions given.        Amount and/or Complexity of Data Reviewed  Clinical lab tests: ordered and reviewed  Tests in the radiology section of CPT®: ordered and reviewed    Risk of Complications, Morbidity, and/or Mortality  Presenting problems: moderate  Diagnostic procedures: moderate  Management options: moderate    Critical Care  Total time providing critical care: < 30 minutes    Patient Progress  Patient progress: improved          -  Patient seen and evaluated in the emergency department. -  Triage and nursing notes reviewed and incorporated. -  Old chart records reviewed and incorporated. -  Work-up included:  See above  -  Results discussed with patient. REASSESSMENT          CRITICAL CARE TIME     This excludes seperately billable procedures and family discussion time. Critical care time provided for obtaining history, conducting a physical exam, performing and monitoring interventions, ordering, collecting and interpreting tests, and establishing medical decision-making. There was a potential for life/limb threatening pathology requiring close evaluation and intervention with concern for patient decompensation. CONSULTS:  None    PROCEDURES:  None performed unless otherwise noted below     Procedures        FINAL IMPRESSION      1. Fall, initial encounter    2. Sprain of anterior talofibular ligament of left ankle, initial encounter    3.  Strain of neck muscle, initial encounter          DISPOSITION/PLAN   DISPOSITION Decision To Discharge 03/09/2021 08:54:41 PM      PATIENT REFERRED TO:  Osmin Nunes MD  [facility] [place]  [phone]    In 3 days        DISCHARGE MEDICATIONS:  Discharge Medication List as of 3/9/2021  9:23 PM      START taking these medications    Details   acetaminophen (TYLENOL) 500 MG tablet Take 1 tablet by mouth every 6 hours as needed for Pain, Disp-28 tablet, R-0Normal      ibuprofen (ADVIL;MOTRIN) 600 MG tablet Take 1 tablet by mouth every 6 hours as needed for Pain, Disp-28 tablet, R-0Normal      baclofen (LIORESAL) 10 MG tablet Take 1 tablet by mouth 3 times daily, Disp-30 tablet, R-0Normal      lidocaine (LIDODERM) 5 % Place 1 patch onto the skin daily 12 hours on, 12 hours off., Disp-30 patch, R-0Normal             ED Provider Disposition Time  DISPOSITION Decision To Discharge 03/09/2021 08:54:41 PM      Appropriate personal protective equipment was worn during the patient's evaluation. These included surgical, eye protection, surgical mask or in 95 respirator and gloves. The patient was also placed in a surgical mask for the prevention of possible spread of respiratory viral illnesses. The Patient was instructed to read the package inserts with any medication that was prescribed. Major potential reactions and medication interactions were discussed. The Patient understands that there are numerous possible adverse reactions not covered. The patient was also instructed to arrange follow-up with his or her primary care provider for review of any pending labwork or incidental findings on any radiology results that were obtained. All efforts were made to discuss any incidental findings that require further monitoring. Controlled Substances Monitoring:     No flowsheet data found.     (Please note that portions of this note were completed with a voice recognition program.  Efforts were made to edit the dictations but occasionally words are mis-transcribed.)    Mushtaq Robbins DO (electronically signed)  Attending Emergency Physician            Mushtaq Robbins DO  03/10/21 1959

## 2021-05-07 ENCOUNTER — HOSPITAL ENCOUNTER (EMERGENCY)
Age: 36
Discharge: HOME | End: 2021-05-07
Payer: COMMERCIAL

## 2021-05-07 ENCOUNTER — APPOINTMENT (OUTPATIENT)
Dept: GENERAL RADIOLOGY | Age: 36
End: 2021-05-07
Payer: COMMERCIAL

## 2021-05-07 VITALS
HEART RATE: 64 BPM | DIASTOLIC BLOOD PRESSURE: 53 MMHG | RESPIRATION RATE: 20 BRPM | OXYGEN SATURATION: 92 % | TEMPERATURE: 98.1 F | SYSTOLIC BLOOD PRESSURE: 116 MMHG | HEIGHT: 72 IN | WEIGHT: 293 LBS | BODY MASS INDEX: 39.68 KG/M2

## 2021-05-07 DIAGNOSIS — R07.9 CHEST PAIN, UNSPECIFIED TYPE: Primary | ICD-10-CM

## 2021-05-07 LAB
ALBUMIN SERPL-MCNC: 4 GM/DL (ref 3.4–5)
ALP BLD-CCNC: 72 IU/L (ref 40–128)
ALT SERPL-CCNC: 20 U/L (ref 10–40)
ANION GAP SERPL CALCULATED.3IONS-SCNC: 9 MMOL/L (ref 4–16)
AST SERPL-CCNC: 17 IU/L (ref 15–37)
BASOPHILS ABSOLUTE: 0.1 K/CU MM
BASOPHILS RELATIVE PERCENT: 0.6 % (ref 0–1)
BILIRUB SERPL-MCNC: 0.3 MG/DL (ref 0–1)
BUN BLDV-MCNC: 14 MG/DL (ref 6–23)
CALCIUM SERPL-MCNC: 9.1 MG/DL (ref 8.3–10.6)
CHLORIDE BLD-SCNC: 102 MMOL/L (ref 99–110)
CO2: 27 MMOL/L (ref 21–32)
CREAT SERPL-MCNC: 0.6 MG/DL (ref 0.6–1.1)
D DIMER: 208 NG/ML(DDU)
DIFFERENTIAL TYPE: ABNORMAL
EKG ATRIAL RATE: 80 BPM
EKG DIAGNOSIS: NORMAL
EKG P AXIS: 19 DEGREES
EKG P-R INTERVAL: 168 MS
EKG Q-T INTERVAL: 362 MS
EKG QRS DURATION: 84 MS
EKG QTC CALCULATION (BAZETT): 417 MS
EKG R AXIS: 15 DEGREES
EKG T AXIS: 63 DEGREES
EKG VENTRICULAR RATE: 80 BPM
EOSINOPHILS ABSOLUTE: 0.5 K/CU MM
EOSINOPHILS RELATIVE PERCENT: 3.6 % (ref 0–3)
GFR AFRICAN AMERICAN: >60 ML/MIN/1.73M2
GFR NON-AFRICAN AMERICAN: >60 ML/MIN/1.73M2
GLUCOSE BLD-MCNC: 97 MG/DL (ref 70–99)
HCG QUALITATIVE: NEGATIVE
HCT VFR BLD CALC: 43.4 % (ref 37–47)
HEMOGLOBIN: 13.4 GM/DL (ref 12.5–16)
IMMATURE NEUTROPHIL %: 0.3 % (ref 0–0.43)
LYMPHOCYTES ABSOLUTE: 2.8 K/CU MM
LYMPHOCYTES RELATIVE PERCENT: 22.8 % (ref 24–44)
MCH RBC QN AUTO: 30.2 PG (ref 27–31)
MCHC RBC AUTO-ENTMCNC: 30.9 % (ref 32–36)
MCV RBC AUTO: 98 FL (ref 78–100)
MONOCYTES ABSOLUTE: 0.9 K/CU MM
MONOCYTES RELATIVE PERCENT: 7.1 % (ref 0–4)
NUCLEATED RBC %: 0 %
PDW BLD-RTO: 12.9 % (ref 11.7–14.9)
PLATELET # BLD: 290 K/CU MM (ref 140–440)
PMV BLD AUTO: 10 FL (ref 7.5–11.1)
POTASSIUM SERPL-SCNC: 4.1 MMOL/L (ref 3.5–5.1)
RBC # BLD: 4.43 M/CU MM (ref 4.2–5.4)
SEGMENTED NEUTROPHILS ABSOLUTE COUNT: 8.1 K/CU MM
SEGMENTED NEUTROPHILS RELATIVE PERCENT: 65.6 % (ref 36–66)
SODIUM BLD-SCNC: 138 MMOL/L (ref 135–145)
TOTAL IMMATURE NEUTOROPHIL: 0.04 K/CU MM
TOTAL NUCLEATED RBC: 0 K/CU MM
TOTAL PROTEIN: 6.6 GM/DL (ref 6.4–8.2)
TROPONIN T: <0.01 NG/ML
TROPONIN T: <0.01 NG/ML
WBC # BLD: 12.4 K/CU MM (ref 4–10.5)

## 2021-05-07 PROCEDURE — 36415 COLL VENOUS BLD VENIPUNCTURE: CPT

## 2021-05-07 PROCEDURE — 80053 COMPREHEN METABOLIC PANEL: CPT

## 2021-05-07 PROCEDURE — 99285 EMERGENCY DEPT VISIT HI MDM: CPT

## 2021-05-07 PROCEDURE — 93005 ELECTROCARDIOGRAM TRACING: CPT | Performed by: EMERGENCY MEDICINE

## 2021-05-07 PROCEDURE — 84703 CHORIONIC GONADOTROPIN ASSAY: CPT

## 2021-05-07 PROCEDURE — 6360000002 HC RX W HCPCS: Performed by: PHYSICIAN ASSISTANT

## 2021-05-07 PROCEDURE — 71045 X-RAY EXAM CHEST 1 VIEW: CPT

## 2021-05-07 PROCEDURE — 85379 FIBRIN DEGRADATION QUANT: CPT

## 2021-05-07 PROCEDURE — 93010 ELECTROCARDIOGRAM REPORT: CPT | Performed by: INTERNAL MEDICINE

## 2021-05-07 PROCEDURE — 85025 COMPLETE CBC W/AUTO DIFF WBC: CPT

## 2021-05-07 PROCEDURE — 84484 ASSAY OF TROPONIN QUANT: CPT

## 2021-05-07 PROCEDURE — 96372 THER/PROPH/DIAG INJ SC/IM: CPT

## 2021-05-07 PROCEDURE — 6370000000 HC RX 637 (ALT 250 FOR IP): Performed by: PHYSICIAN ASSISTANT

## 2021-05-07 RX ORDER — LIDOCAINE HYDROCHLORIDE 20 MG/ML
15 SOLUTION OROPHARYNGEAL ONCE
Status: COMPLETED | OUTPATIENT
Start: 2021-05-07 | End: 2021-05-07

## 2021-05-07 RX ORDER — KETOROLAC TROMETHAMINE 30 MG/ML
30 INJECTION, SOLUTION INTRAMUSCULAR; INTRAVENOUS ONCE
Status: DISCONTINUED | OUTPATIENT
Start: 2021-05-07 | End: 2021-05-07

## 2021-05-07 RX ORDER — MAGNESIUM HYDROXIDE/ALUMINUM HYDROXICE/SIMETHICONE 120; 1200; 1200 MG/30ML; MG/30ML; MG/30ML
30 SUSPENSION ORAL ONCE
Status: COMPLETED | OUTPATIENT
Start: 2021-05-07 | End: 2021-05-07

## 2021-05-07 RX ORDER — KETOROLAC TROMETHAMINE 30 MG/ML
30 INJECTION, SOLUTION INTRAMUSCULAR; INTRAVENOUS ONCE
Status: COMPLETED | OUTPATIENT
Start: 2021-05-07 | End: 2021-05-07

## 2021-05-07 RX ORDER — ONDANSETRON 4 MG/1
4 TABLET, ORALLY DISINTEGRATING ORAL ONCE
Status: COMPLETED | OUTPATIENT
Start: 2021-05-07 | End: 2021-05-07

## 2021-05-07 RX ADMIN — ONDANSETRON 4 MG: 4 TABLET, ORALLY DISINTEGRATING ORAL at 05:25

## 2021-05-07 RX ADMIN — ALUMINUM HYDROXIDE, MAGNESIUM HYDROXIDE, AND SIMETHICONE 30 ML: 200; 200; 20 SUSPENSION ORAL at 04:41

## 2021-05-07 RX ADMIN — KETOROLAC TROMETHAMINE 30 MG: 30 INJECTION, SOLUTION INTRAMUSCULAR; INTRAVENOUS at 04:40

## 2021-05-07 RX ADMIN — LIDOCAINE HYDROCHLORIDE 15 ML: 20 SOLUTION ORAL; TOPICAL at 04:41

## 2021-05-07 ASSESSMENT — PAIN SCALES - GENERAL: PAINLEVEL_OUTOF10: 7

## 2021-05-07 ASSESSMENT — HEART SCORE: ECG: 0

## 2021-05-07 NOTE — ED TRIAGE NOTES
Pt presents to the ED c/o chest pain since midnight. Pt is alert and oriented, rates pain 10/10. States the pain radiates to her back. States that she has taken 4 baby aspirins and some tums and nothing has helped with the pain.

## 2021-05-07 NOTE — ED PROVIDER NOTES
eMERGENCY dEPARTMENT eNCOUnter        [facility]    Chief Complaint   Patient presents with    Chest Pain       HPI    Vanessa Daugherty is a 30 y.o. female who presents with chest pain. Onset was around midnight. Constant since onset. Localized to the substernal area with radiation to the back. Characterized as sharp, stabbing. Aggravating factors: lying flat. Alleviating factors: temporary relief with Pepcid. Severity is a 10 on a scale of 0-10. Patient reports associated shortness of breath, nausea. Denies fever, chills, diaphoresis. Denies cough or congestion. Denies abd pain. Denies leg pain or swelling. No history of CAD, HTN, HLD, DM.  + smoker. + family history of CAD. No exogenous estrogen. No recent travel, hospitalizations, surgeries. REVIEW OF SYSTEMS    Constitutional:  Denies fever, denies diaphoresis. HENT:  Denies headache, denies dizziness/lightheadedness. Respiratory:  + shortness of breath, denies cough. Cardiovascular:  + chest pain. GI:  Denies abdominal pain, + nausea, denies vomiting. :  Denies dysuria, frequency, urgency, urinary incontinence. Musculoskeletal:  Denies extremity swelling/pain. Integument:  Denies rashes, lesions. Neurologic:  Denies numbness/tingling. All other systems reviewed and negative. PAST MEDICAL & SURGICAL HISTORY    Past Medical History:   Diagnosis Date    Anxiety disorder     Arthritis     Asthma     Follows with Lizbeth    Bipolar disorder ([facility] [address]     Not on meds as of 12/3/19    Depression     currently not on meds (12/3/19)    Drug addiction ([facility] [address]     states previous addiction to vicodin (2011), denies current use. Hx + UDS for cocaine, 12/3/2019    Family history of heart disease     \"had to see Dr Ann Rodriguez and he said every thing ok- approx 2 yrs ago \"    H/O echocardiogram 12/19/2018    Technically difficult examination due to body habitus. Left ventricular function is low normal, EF is estimated at 45-50%. Mild concentric left ventricular hypertrophy. Mildly dilated left atrium. Right ventricular systolic pressure of 29 mm Hg. Mild tricuspid regurgitation, no evidence of any pericardial effusion.      LTLNKTMG(188.8)     Last headache:  12/2/19    History of exercise stress test 05/04/2017    treadmill    Miscarriage     x4    Pain management     Was Dr. Suellen Riedel - left his practice 11/2019    Prolonged emergence from general anesthesia     \"with last surgery had trouble keeping me awake and ended up admitted me- and oxygen level kept going down    Sleep apnea     sleep study 2019- has cpap does not wear daily    Wears glasses      Past Surgical History:   Procedure Laterality Date    CARPAL TUNNEL RELEASE Left 10/2019    Dr. Mendy Qureshi Right 2016   [facility]      2012 & 2014   [address] [place] [address]      2010 & 2011   [address] [facility] N/A 1/9/2020    HERNIA SUPRAUMBILICAL/ VENTRAL REPAIR LAPAROSCOPIC ROBOTIC WITH MESH performed by Bailey Rey MD at [address] [facility] ARTHROSCOPY Right 07/2019    Dr. Clair Stewart      Several on bottom & top - no partials    TUBAL LIGATION  2014    UPPER GASTROINTESTINAL ENDOSCOPY N/A 10/5/2020    EGD BIOPSY performed by Bailey Rey MD at [address] [facility]    Current Outpatient Rx   Medication Sig Dispense Refill    acetaminophen (TYLENOL) 500 MG tablet Take 1 tablet by mouth every 6 hours as needed for Pain 28 tablet 0    ibuprofen (ADVIL;MOTRIN) 600 MG tablet Take 1 tablet by mouth every 6 hours as needed for Pain 28 tablet 0    baclofen (LIORESAL) 10 MG tablet Take 1 tablet by mouth 3 times daily 30 tablet 0    butalbital-acetaminophen-caffeine (FIORICET, ESGIC) -40 MG per tablet Take 1 tablet by mouth every 4 hours as needed for Headaches      fluticasone-vilanterol (BREO ELLIPTA) 100-25 MCG/INH AEPB inhaler Inhale 1 puff into the lungs daily 1 each 3    guaiFENesin (MUCINEX) 600 MG extended release tablet Take 1 tablet by mouth 2 times daily 60 tablet 5    hydrOXYzine (VISTARIL) 25 MG capsule       lisdexamfetamine (VYVANSE) 50 MG capsule Take 50 mg by mouth every morning.  propranolol (INDERAL) 20 MG tablet Take 20 mg by mouth 3 times daily      albuterol sulfate  (90 Base) MCG/ACT inhaler Inhale 2 puffs into the lungs 4 times daily 1 Inhaler 5    Elastic Bandages & Supports (KNEE BRACE/CUSHION/L-XL) MISC Please dispense one knee brace to be worn for 8 hrs during the day and taken off at nighttime for right knee for comfort.  1 each 0       ALLERGIES    Allergies   Allergen Reactions    Latex Hives    Shellfish-Derived Products      \"break out like tomato and swell - trouble swallowing- with shrimp    Ultram [Tramadol Hcl] Hives       SOCIAL & FAMILY HISTORY    Social History     Socioeconomic History    Marital status: Single     Spouse name: None    Number of children: None    Years of education: None    Highest education level: None   Occupational History    None   Social Needs    Financial resource strain: None    Food insecurity     Worry: None     Inability: None    Transportation needs     Medical: None     Non-medical: None   Tobacco Use    Smoking status: Current Every Day Smoker     Packs/day: 1.00     Years: 18.00     Pack years: 18.00     Types: Cigarettes     Start date: 2001    Smokeless tobacco: Never Used   Substance and Sexual Activity    Alcohol use: No    Drug use: Not Currently     Types: Marijuana     Comment: Last used: 2005/ per pt on 10/1/2020\"last used cocaine 2019    Sexual activity: Yes     Partners: Male   Lifestyle    Physical activity     Days per week: None     Minutes per session: None    Stress: None   Relationships    Social connections     Talks on phone: None     Gets together: None     Attends Baptism service: None     Active member of club or organization: None     Attends meetings of clubs or organizations: None     Relationship status: None    Intimate partner violence     Fear of current or ex partner: None     Emotionally abused: None     Physically abused: None     Forced sexual activity: None   Other Topics Concern    None   Social History Narrative    None     Family History   Problem Relation Age of Onset    Diabetes Mother     High Blood Pressure Mother     Heart Disease Mother     Heart Disease Maternal Grandmother     Heart Disease Maternal Grandfather        PHYSICAL EXAM    VITAL SIGNS: /73   Pulse 79   Temp 98.1 °F (36.7 °C) (Oral)   Resp 22   Ht 5' 11.5\" (1.816 m)   Wt (!) 417 lb (189.1 kg)   SpO2 98%   BMI 57.35 kg/m²    Constitutional:  Well developed, well nourished, no acute distress   HENT:  NC/AT. Ears, nose, mouth normal.  Neck:  Supple. Respiratory:  Lungs CTAB. Cardiovascular:  RRR. Vascular:  Distal pulses intact. GI:  Abdomen soft, non-tender. Bowel sounds active. Musculoskeletal:  No edema, no tenderness. Integument:  Warm and dry. No petechiae. Neurologic:  Alert & oriented. Psych: Pleasant affect. EKG    Please see Dr. Long Brothers note for interpretation.     RADIOLOGY/PROCEDURES    Labs Reviewed   CBC WITH AUTO DIFFERENTIAL - Abnormal; Notable for the following components:       Result Value    WBC 12.4 (*)     MCHC 30.9 (*)     Lymphocytes % 22.8 (*)     Monocytes % 7.1 (*)     Eosinophils % 3.6 (*)     All other components within normal limits   COMPREHENSIVE METABOLIC PANEL W/ REFLEX TO MG FOR LOW K   TROPONIN   D-DIMER, QUANTITATIVE   HCG, SERUM, QUALITATIVE   TROPONIN     Xr Chest Portable    Result Date: 5/7/2021  EXAMINATION: ONE XRAY VIEW OF THE CHEST 5/7/2021 3:24 am COMPARISON: 02/09/2020 HISTORY: ORDERING SYSTEM PROVIDED HISTORY: chest pain TECHNOLOGIST PROVIDED HISTORY: Reason for exam:->chest pain Reason for Exam: chest pain Acuity: Acute Type of Exam: Initial Mechanism of Injury: chest pain Relevant Medical/Surgical History: chest pain FINDINGS: The lungs are underinflated, resulting in vascular crowding and subsegmental atelectasis. No focal consolidation, pleural effusion or pneumothorax. The cardiac silhouette and mediastinal contours are stable. No acute bony abnormality. No acute process. ED COURSE & MEDICAL DECISION MAKING    Pertinent Labs & Imaging studies reviewed and interpreted. (See chart for details)  -  Patient seen and evaluated in the emergency department. -  Triage and nursing notes reviewed and incorporated. -  Old chart records reviewed and incorporated. -  Supervising physician was Dr. Madie Barakat. Patient was seen independently. -  Differential diagnosis includes: ACS, CHF, MI, PE, thoracic dissection, pericarditis, pericardial effusion, pneumonia, pneumothorax, GI cause, and others. -  Work-up included:  See above  -  Patient treated with GI cocktail, Toradol, Zofran in the ED.  -  Results discussed with patient. Initial cardiac work-up is unremarkable. Pain is resolved but she is feeling nauseated on re-check so Zofran was ordered. HEART score is a 2-3. We did discuss delta troponin, which patient elected to have performed. This is also negative. Patient will be discharged home. Close FU with PCP and/or Cardiology. Return here as needed. She is agreeable with plan of care and disposition.  -  DC home. In light of current events, I did utilize appropriate PPE (including N95 and surgical face mask, safety glasses, and gloves, as recommended by the health facility/national standard best practice, during my bedside interactions with the patient. FINAL IMPRESSION    1.  Chest pain, unspecified type              (Please note that this note was completed with a voice recognition program.  Every attempt was made to edit the dictations, but inevitably there remain words that are mis-transcribed.)        Coty Johnson PA-C  05/08/21 2773

## 2021-05-12 ENCOUNTER — HOSPITAL ENCOUNTER (EMERGENCY)
Age: 36
Discharge: HOME | End: 2021-05-12
Payer: COMMERCIAL

## 2021-05-12 VITALS
OXYGEN SATURATION: 96 % | TEMPERATURE: 97.7 F | SYSTOLIC BLOOD PRESSURE: 133 MMHG | RESPIRATION RATE: 19 BRPM | DIASTOLIC BLOOD PRESSURE: 71 MMHG | HEART RATE: 87 BPM

## 2021-05-12 DIAGNOSIS — J06.9 ACUTE UPPER RESPIRATORY INFECTION: Primary | ICD-10-CM

## 2021-05-12 DIAGNOSIS — J01.90 ACUTE SINUSITIS, RECURRENCE NOT SPECIFIED, UNSPECIFIED LOCATION: ICD-10-CM

## 2021-05-12 LAB
SARS-COV-2, NAAT: NOT DETECTED
SOURCE: NORMAL

## 2021-05-12 PROCEDURE — 87635 SARS-COV-2 COVID-19 AMP PRB: CPT

## 2021-05-12 PROCEDURE — 6370000000 HC RX 637 (ALT 250 FOR IP): Performed by: PHYSICIAN ASSISTANT

## 2021-05-12 PROCEDURE — 99283 EMERGENCY DEPT VISIT LOW MDM: CPT

## 2021-05-12 RX ORDER — BUTALBITAL, ACETAMINOPHEN AND CAFFEINE 50; 325; 40 MG/1; MG/1; MG/1
1 TABLET ORAL EVERY 4 HOURS PRN
Qty: 20 TABLET | Refills: 0 | Status: SHIPPED | OUTPATIENT
Start: 2021-05-12 | End: 2021-06-23 | Stop reason: ALTCHOICE

## 2021-05-12 RX ORDER — CETIRIZINE HYDROCHLORIDE 10 MG/1
10 TABLET ORAL DAILY
Qty: 30 TABLET | Refills: 0 | Status: SHIPPED | OUTPATIENT
Start: 2021-05-12 | End: 2022-03-11 | Stop reason: ALTCHOICE

## 2021-05-12 RX ORDER — AMOXICILLIN AND CLAVULANATE POTASSIUM 875; 125 MG/1; MG/1
1 TABLET, FILM COATED ORAL 2 TIMES DAILY
Qty: 14 TABLET | Refills: 0 | Status: SHIPPED | OUTPATIENT
Start: 2021-05-12 | End: 2021-05-19

## 2021-05-12 RX ORDER — PREDNISONE 20 MG/1
60 TABLET ORAL ONCE
Status: COMPLETED | OUTPATIENT
Start: 2021-05-12 | End: 2021-05-12

## 2021-05-12 RX ORDER — AMOXICILLIN AND CLAVULANATE POTASSIUM 875; 125 MG/1; MG/1
1 TABLET, FILM COATED ORAL ONCE
Status: COMPLETED | OUTPATIENT
Start: 2021-05-12 | End: 2021-05-12

## 2021-05-12 RX ORDER — METHYLPREDNISOLONE 4 MG/1
TABLET ORAL
Qty: 1 KIT | Refills: 0 | Status: SHIPPED | OUTPATIENT
Start: 2021-05-12 | End: 2021-05-18

## 2021-05-12 RX ORDER — BUTALBITAL, ACETAMINOPHEN AND CAFFEINE 50; 325; 40 MG/1; MG/1; MG/1
1 TABLET ORAL ONCE
Status: COMPLETED | OUTPATIENT
Start: 2021-05-12 | End: 2021-05-12

## 2021-05-12 RX ADMIN — AMOXICILLIN AND CLAVULANATE POTASSIUM 1 TABLET: 875; 125 TABLET, FILM COATED ORAL at 21:14

## 2021-05-12 RX ADMIN — PREDNISONE 60 MG: 20 TABLET ORAL at 21:14

## 2021-05-12 RX ADMIN — BUTALBITAL, ACETAMINOPHEN, AND CAFFEINE 1 TABLET: 50; 325; 40 TABLET ORAL at 21:14

## 2021-05-12 ASSESSMENT — PAIN SCALES - GENERAL: PAINLEVEL_OUTOF10: 10

## 2021-05-12 ASSESSMENT — PAIN DESCRIPTION - PAIN TYPE: TYPE: ACUTE PAIN

## 2021-05-12 NOTE — ED TRIAGE NOTES
Pt presents to the ED with complaints of congestion, bilateral ear pain, cough, N&V, and headache. Pt states she was around 3 kids that tested positive for strep recently. Pt reports she went to an urgent care and they put her on an antibiotic, amoxicillin which she started on Monday.

## 2021-05-13 NOTE — ED PROVIDER NOTES
EMERGENCY DEPARTMENT ENCOUNTER      PCP: Js Meade MD    [facility]    Chief Complaint   Patient presents with    Cough     wants tested for covid    Nasal Congestion    Pharyngitis     states she has been exposed to strep    Otalgia     states was dx with ear infection on monday and given an ATB    Generalized Body Aches     This patient was not evaluated by the attending physician. I have independently evaluated this patient . HPI    Digna Salazar is a 30 y.o. female who presents to the emergency department with nasal congestion, sinus pressure, otalgia, malaise. Has had a stroke sore throat. Recently seen at urgent care placed on amoxicillin. Patient concerned about Covid. Denies any chest pain, profound shortness of breath, no wheezing. Has more nasal congestion sinus pressure. Has had a recent evaluation in the ED that appeared well. States that the antibiotics are not working. REVIEW OF SYSTEMS    Constitutional:  Denies fever, chills  HEENT:  See above  Cardiovascular:  Denies chest pain, palpitations. Respiratory:  Denies wheezes or shortness of breath   GI:  Denies abdominal pain, vomiting, or diarrhea   Neurologic:  Denies confusion, light headedness, dizziness, or syncope   Skin:  No rash  All other review of systems are negative  See HPI and nursing notes for additional information       PAST MEDICAL AND SURGICAL HISTORY    Past Medical History:   Diagnosis Date    Anxiety disorder     Arthritis     Asthma     Follows with Lizbeth    Bipolar disorder ([facility] [address]     Not on meds as of 12/3/19    Depression     currently not on meds (12/3/19)    Drug addiction ([facility] [address]     states previous addiction to vicodin (2011), denies current use. Hx + UDS for cocaine, 12/3/2019    Family history of heart disease     \"had to see Dr Bro Anthony and he said every thing ok- approx 2 yrs ago \"    H/O echocardiogram 12/19/2018    Technically difficult examination due to body habitus.  Left ventricular function is low normal, EF is estimated at 45-50%. Mild concentric left ventricular hypertrophy. Mildly dilated left atrium. Right ventricular systolic pressure of 29 mm Hg. Mild tricuspid regurgitation, no evidence of any pericardial effusion.  VHUOMJLK(114.3)     Last headache:  19    History of exercise stress test 2017    treadmill    Miscarriage     x4    Pain management     Was Dr. Gloria Daugherty - left his practice 2019    Prolonged emergence from general anesthesia     \"with last surgery had trouble keeping me awake and ended up admitted me- and oxygen level kept going down    Sleep apnea     sleep study 2019- has cpap does not wear daily    Wears glasses      Past Surgical History:   Procedure Laterality Date    CARPAL TUNNEL RELEASE Left 10/2019    Dr. Farzad Wakefield Right 2016     SECTION       &    [id number] [facility]       &    [address] [facility] N/A 2020    HERNIA SUPRAUMBILICAL/ VENTRAL REPAIR LAPAROSCOPIC ROBOTIC WITH MESH performed by Gil Baeza MD at [address] [facility] ARTHROSCOPY Right 2019    Dr. Licha Irizarry      Several on bottom & top - no partials    TUBAL LIGATION  2014    UPPER GASTROINTESTINAL ENDOSCOPY N/A 10/5/2020    EGD BIOPSY performed by Gil Baeza MD at [address]    Current Outpatient Rx   Medication Sig Dispense Refill    amoxicillin-clavulanate (AUGMENTIN) 875-125 MG per tablet Take 1 tablet by mouth 2 times daily for 7 days 14 tablet 0    methylPREDNISolone (MEDROL, SHELTON,) 4 MG tablet Take by mouth.  1 kit 0    butalbital-acetaminophen-caffeine (FIORICET, ESGIC) -40 MG per tablet Take 1 tablet by mouth every 4 hours as needed for Headaches 20 tablet 0    cetirizine (ZYRTEC) 10 MG tablet Take 1 tablet by mouth daily 30 tablet 0    acetaminophen (TYLENOL) 500 MG tablet Take 1 tablet by mouth every 6 hours as needed for Pain 28 tablet 0    ibuprofen (ADVIL;MOTRIN) 600 MG tablet Take 1 tablet by mouth every 6 hours as needed for Pain 28 tablet 0    baclofen (LIORESAL) 10 MG tablet Take 1 tablet by mouth 3 times daily 30 tablet 0    fluticasone-vilanterol (BREO ELLIPTA) 100-25 MCG/INH AEPB inhaler Inhale 1 puff into the lungs daily 1 each 3    guaiFENesin (MUCINEX) 600 MG extended release tablet Take 1 tablet by mouth 2 times daily 60 tablet 5    hydrOXYzine (VISTARIL) 25 MG capsule       lisdexamfetamine (VYVANSE) 50 MG capsule Take 50 mg by mouth every morning.  propranolol (INDERAL) 20 MG tablet Take 20 mg by mouth 3 times daily      albuterol sulfate  (90 Base) MCG/ACT inhaler Inhale 2 puffs into the lungs 4 times daily 1 Inhaler 5    Elastic Bandages & Supports (KNEE BRACE/CUSHION/L-XL) MISC Please dispense one knee brace to be worn for 8 hrs during the day and taken off at nighttime for right knee for comfort.  1 each 0       ALLERGIES    Allergies   Allergen Reactions    Latex Hives    Shellfish-Derived Products      \"break out like tomato and swell - trouble swallowing- with shrimp    Ultram [Tramadol Hcl] Hives       FAMILY AND SOCIAL HISTORY    Family History   Problem Relation Age of Onset    Diabetes Mother     High Blood Pressure Mother     Heart Disease Mother     Heart Disease Maternal Grandmother     Heart Disease Maternal Grandfather      Social History     Socioeconomic History    Marital status: Single     Spouse name: None    Number of children: None    Years of education: None    Highest education level: None   Occupational History    None   Social Needs    Financial resource strain: None    Food insecurity     Worry: None     Inability: None    Transportation needs     Medical: None     Non-medical: None   Tobacco Use    Smoking status: Current Every Day Smoker     Packs/day: 1.00     Years: 18.00     Pack years: 18.00     Types: Cigarettes     Start date: 2001    Smokeless tobacco: Never Used   Substance and Sexual Activity    Alcohol use: No    Drug use: Not Currently     Types: Marijuana     Comment: Last used: 2005/ per pt on 10/1/2020\"last used cocaine 2019    Sexual activity: Yes     Partners: Male   Lifestyle    Physical activity     Days per week: None     Minutes per session: None    Stress: None   Relationships    Social connections     Talks on phone: None     Gets together: None     Attends Scientologist service: None     Active member of club or organization: None     Attends meetings of clubs or organizations: None     Relationship status: None    Intimate partner violence     Fear of current or ex partner: None     Emotionally abused: None     Physically abused: None     Forced sexual activity: None   Other Topics Concern    None   Social History Narrative    None       PHYSICAL EXAM    VITAL SIGNS: /71   Pulse 87   Temp 97.7 °F (36.5 °C)   Resp 19   SpO2 96%   Constitutional:  Well developed, well nourished, no acute distress, non-toxic appearance   Eyes:    -PERRL, EOM intact. Conjunctiva normal, sclera non-icteric  HEENT:     - Normocephalic, atraumatic     -  Frontal/Maxillary sinuses tender to percussion.   - External auditory canals clear   - TMs clear without discharge, fluid, or bulging.   - Nasal passages with mildly erythematous and edematous turbinates. No massess.   - Oropharynx mildly erythematous without tonsillar hypertrophy or exudate. Neck/Lymphatics:    - supple, no JVD, no swollen nodes     Respiratory:    Nonlabored breathing - No retractions, no accessory muscle use   Clear to auscultation bilateral lung fields, no wheezes/rales/rhonchi. Cardiovascular:  Normal rate, normal rhythm   GI:  Soft, no abdominal tenderness, no guarding.   Musculoskeletal:  No obvious deficits, no edema   Integument:  Skin pink, warm, dry, intact     RADIOLOGY/PROCEDURES    Results for orders placed or performed during the hospital encounter of 05/12/21   COVID-19, Rapid    Specimen: Nasopharyngeal   Result Value Ref Range    Source THROAT     SARS-CoV-2, NAAT NOT DETECTED NOT DETECTED     ED [facility]      Patient symptoms are most consistent with sinusitis. Will discontinue amoxicillin and place her on Augmentin. She also be given a burst of steroid. Also be placed on antihistamine. Will encourage saline nasal spray. Covid test was sent, and came back acutely negative. At this point she will be discharged with the Augmentin and steroids and antihistamine. Close monitoring of symptoms and strict return precautions. We discharged home in stable condition    Patient agrees to return emergency department if symptoms worsen or any new symptoms develop. Vital signs and nursing notes reviewed during ED course. Clinical  IMPRESSION    1. Acute upper respiratory infection    2. Acute sinusitis, recurrence not specified, unspecified location      Comment: Please note this report has been produced using speech recognition software and may contain errors related to that system including errors in grammar, punctuation, and spelling, as well as words and phrases that may be inappropriate. If there are any questions or concerns please feel free to contact the dictating provider for clarification.       Salinas Santos [address], PA  05/13/21 1930

## 2021-06-16 ENCOUNTER — HOSPITAL ENCOUNTER (OUTPATIENT)
Dept: PHYSICAL THERAPY | Age: 36
Setting detail: THERAPIES SERIES
Discharge: HOME | End: 2021-06-16
Payer: COMMERCIAL

## 2021-06-16 PROCEDURE — 97161 PT EVAL LOW COMPLEX 20 MIN: CPT

## 2021-06-16 PROCEDURE — 97530 THERAPEUTIC ACTIVITIES: CPT
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
## 2021-06-16 NOTE — PLAN OF CARE
Outpatient Physical Therapy           [place]           [x] Phone: [phone]   Fax: [phone]  [place] [facility]           [] Phone: [phone]   Fax: [phone]     To: Referring Practitioner: Dr. Tray Encinas    From: Marianne Tillman PT, PT     Patient: Lisa Yadav       : 1985  Diagnosis: Diagnosis: Neck pain, chronic pain of both knees   Treatment Diagnosis: Treatment Diagnosis: B knee pain, abnormalities of gait and mobility   Date: 2021    Physical Therapy Certification/Re-Certification Form  Dear Dr. Crystal Perez  The following patient has been evaluated for physical therapy services and for therapy to continue, insurance requires physician review of the treatment plan initially and every 90 days. Please review the attached evaluation and/or summary of the patient's plan of care, and verify that you agree therapy should continue by signing the attached document and sending it back to our office. Assessment:    Assessment: Blanca Gasca is a 30year old female who was referred to physical therapy for treatment of cervical and bilateral knee pain. At this time, Robert Bird declines treatment for the cervical spine stating that she has tried PT in the past without any benefit. Objective findings include decreased B knee AROM (R limited more than left), decreased RLE strength, increased knee valgus bilaterally, gait deficits and transfer deficits. Patient will benefit from skilled therapeutic intervention in this setting to decrease pain and improve overall functional mobility so that she can take care of her children and complete daily tasks.     Plan of Care/Treatment to date:  [x] Therapeutic Exercise  [x] Modalities:  [x] Therapeutic Activity     [] Ultrasound  [] Electrical Stimulation  [x] Gait Training      [] Cervical Traction [] Lumbar Traction  [x] Neuromuscular Re-education    [x] Cold/hotpack [] Iontophoresis   [x] Instruction in HEP      [] Vasopneumatic    [] Dry Needling  [x] Manual

## 2021-06-16 NOTE — FLOWSHEET NOTE
Outpatient Physical Therapy  [place]           [x] Phone: [phone]   Fax: [phone]  [place]           [] Phone: [phone]   Fax: [phone]        Physical Therapy Daily Treatment Note  Date:  2021    Patient Name:  Rosalinda Miller    :  1985  MRN: [id number]  Restrictions/Precautions: Other position/activity restrictions: Latex allergy  Diagnosis:   Diagnosis: Neck pain, chronic pain of both knees  Date of Injury/Surgery:   Treatment Diagnosis: Treatment Diagnosis: B knee pain, abnormalities of gait and mobility    Insurance/Certification information: PT Insurance Information: [facility]   Referring Physician:  Referring Practitioner: Dr. Agnieszka Nobles  Next Doctor Visit:    Plan of care signed (Y/N):  pending  Outcome Measure:  LEFS   Visit# / total visits:     Pain level: 10/10  R knee  Goals:     Patient goals : Decrease pain. Improve mobility. Patient reports that she would like to do aquatic therapy again. Short term goals  Time Frame for Short term goals: 4 weeks  Short term goal 1: Patient will report compliance with HEP. Short term goal 2: Patient will present with improved R knee AROM from 94 degrees flexion to 104 degrees flexion. Short term goal 3: Patient will present with improved gait, ambulating >100ft without an antalgic gait pattern. Short term goal 4: Patient will report a decrease in R knee pain from 10/10 to 3/10. Short term goal 5: Patient will present with an improved ability to perform 5 sit<>stands from 35 seconds using BUE assist to 25 seconds using 1 upper extremity assist.       Summary of Evaluation: Assessment: Jeffery Mccarty is a 30year old female who was referred to physical therapy for treatment of cervical and bilateral knee pain. At this time,  declines treatment for the cervical spine stating that she has tried PT in the past without any benefit.  Objective findings include decreased B knee AROM (R limited more than left), decreased RLE strength, increased knee valgus bilaterally, gait deficits and transfer deficits. Patient will benefit from skilled therapeutic intervention in this setting to decrease pain and improve overall functional mobility so that she can take care of her children and complete daily tasks. Subjective:  See brent         Any changes in Ambulatory Summary Sheet? None        Objective:  See brent   COVID screening questions were asked and patient attested that there had been no contact or symptoms        Exercises: (No more than 4 columns)   Exercise/Equipment 6/16/21 #1 Date Date           WARM UP                     TABLE      Supine SLR reviewed     Supine heel slides reviewed     Seated hamstring stretch reviewed                    STANDING                                                     PROPRIOCEPTION                                    MODALITIES                      Other Therapeutic Activities/Education:  HEP created and reviewed. Discussion of goals for PT and pain management with physical therapy. Discussed Aquatic therapy benefits for B knee pain and activities to continue working on knee ROM and strengthening. Home Exercise Program:  Created and reviewed      Manual Treatments:        Modalities:        Communication with other providers:  POC sent for cosign on 6/16/21      Assessment:  (Response towards treatment session) (Pain Rating)    Assessment: Kaye Reyes is a 30year old female who was referred to physical therapy for treatment of cervical and bilateral knee pain. At this time, Jillian  declines treatment for the cervical spine stating that she has tried PT in the past without any benefit. Objective findings include decreased B knee AROM (R limited more than left), decreased RLE strength, increased knee valgus bilaterally, gait deficits and transfer deficits.  Patient will benefit from skilled therapeutic intervention in this setting to decrease pain and improve overall functional mobility so that she can take care of her children and complete daily tasks. Plan for Next Session: Specific instructions for Next Treatment: AQUATIC THERAPY at [facility]. Work on knee ROM, LE strengthening and gait in pool.       Time In / Time Out:    0140-4177         If BW Please Indicate Time In/Out/Total Time  CPT Code Time in Time out Total Time                                                            Total for session             Timed Code/Total Treatment Minutes:  42'/42'   Eval 30',   ADL 12'      Next Progress Note due:  10th visit      Plan of Care Interventions:  [x] Therapeutic Exercise  [] Modalities:  [x] Therapeutic Activity     [] Ultrasound  [] Estim  [x] Gait Training      [] Cervical Traction [] Lumbar Traction  [x] Neuromuscular Re-education    [x] Cold/hotpack [] Iontophoresis   [x] Instruction in HEP      [] Vasopneumatic   [] Dry Needling    [x] Manual Therapy               [x] Aquatic Therapy              Electronically signed by:  Lisbeth Hayden PT, 6/16/2021, 3:56 PM

## 2021-06-22 ENCOUNTER — APPOINTMENT (OUTPATIENT)
Dept: CT IMAGING | Age: 36
End: 2021-06-22
Payer: COMMERCIAL

## 2021-06-22 ENCOUNTER — HOSPITAL ENCOUNTER (EMERGENCY)
Age: 36
Discharge: HOME | End: 2021-06-22
Attending: EMERGENCY MEDICINE
Payer: COMMERCIAL

## 2021-06-22 VITALS
RESPIRATION RATE: 18 BRPM | HEART RATE: 103 BPM | TEMPERATURE: 98.6 F | BODY MASS INDEX: 39.68 KG/M2 | WEIGHT: 293 LBS | OXYGEN SATURATION: 98 % | DIASTOLIC BLOOD PRESSURE: 81 MMHG | SYSTOLIC BLOOD PRESSURE: 135 MMHG | HEIGHT: 72 IN

## 2021-06-22 DIAGNOSIS — L03.317 CELLULITIS OF BUTTOCK: Primary | ICD-10-CM

## 2021-06-22 LAB
ALBUMIN SERPL-MCNC: 3.9 GM/DL (ref 3.4–5)
ALP BLD-CCNC: 74 IU/L (ref 40–128)
ALT SERPL-CCNC: 18 U/L (ref 10–40)
ANION GAP SERPL CALCULATED.3IONS-SCNC: 9 MMOL/L (ref 4–16)
AST SERPL-CCNC: 16 IU/L (ref 15–37)
BASOPHILS ABSOLUTE: 0 K/CU MM
BASOPHILS RELATIVE PERCENT: 0.4 % (ref 0–1)
BILIRUB SERPL-MCNC: 0.3 MG/DL (ref 0–1)
BUN BLDV-MCNC: 10 MG/DL (ref 6–23)
CALCIUM SERPL-MCNC: 9.3 MG/DL (ref 8.3–10.6)
CHLORIDE BLD-SCNC: 102 MMOL/L (ref 99–110)
CO2: 29 MMOL/L (ref 21–32)
CREAT SERPL-MCNC: 0.7 MG/DL (ref 0.6–1.1)
DIFFERENTIAL TYPE: ABNORMAL
EOSINOPHILS ABSOLUTE: 0.3 K/CU MM
EOSINOPHILS RELATIVE PERCENT: 2.8 % (ref 0–3)
GFR AFRICAN AMERICAN: >60 ML/MIN/1.73M2
GFR NON-AFRICAN AMERICAN: >60 ML/MIN/1.73M2
GLUCOSE BLD-MCNC: 104 MG/DL (ref 70–99)
HCT VFR BLD CALC: 43.4 % (ref 37–47)
HEMOGLOBIN: 13.7 GM/DL (ref 12.5–16)
IMMATURE NEUTROPHIL %: 0.4 % (ref 0–0.43)
LYMPHOCYTES ABSOLUTE: 2.2 K/CU MM
LYMPHOCYTES RELATIVE PERCENT: 21.4 % (ref 24–44)
MCH RBC QN AUTO: 29.5 PG (ref 27–31)
MCHC RBC AUTO-ENTMCNC: 31.6 % (ref 32–36)
MCV RBC AUTO: 93.5 FL (ref 78–100)
MONOCYTES ABSOLUTE: 0.7 K/CU MM
MONOCYTES RELATIVE PERCENT: 6.7 % (ref 0–4)
NUCLEATED RBC %: 0 %
PDW BLD-RTO: 12.7 % (ref 11.7–14.9)
PLATELET # BLD: 305 K/CU MM (ref 140–440)
PMV BLD AUTO: 10.1 FL (ref 7.5–11.1)
POTASSIUM SERPL-SCNC: 4.2 MMOL/L (ref 3.5–5.1)
RBC # BLD: 4.64 M/CU MM (ref 4.2–5.4)
SEGMENTED NEUTROPHILS ABSOLUTE COUNT: 7.1 K/CU MM
SEGMENTED NEUTROPHILS RELATIVE PERCENT: 68.3 % (ref 36–66)
SODIUM BLD-SCNC: 140 MMOL/L (ref 135–145)
TOTAL IMMATURE NEUTOROPHIL: 0.04 K/CU MM
TOTAL NUCLEATED RBC: 0 K/CU MM
TOTAL PROTEIN: 7.4 GM/DL (ref 6.4–8.2)
WBC # BLD: 10.4 K/CU MM (ref 4–10.5)

## 2021-06-22 PROCEDURE — 6370000000 HC RX 637 (ALT 250 FOR IP): Performed by: EMERGENCY MEDICINE

## 2021-06-22 PROCEDURE — 85025 COMPLETE CBC W/AUTO DIFF WBC: CPT

## 2021-06-22 PROCEDURE — 72192 CT PELVIS W/O DYE: CPT

## 2021-06-22 PROCEDURE — 99285 EMERGENCY DEPT VISIT HI MDM: CPT

## 2021-06-22 PROCEDURE — 80053 COMPREHEN METABOLIC PANEL: CPT

## 2021-06-22 RX ORDER — OXYCODONE HYDROCHLORIDE AND ACETAMINOPHEN 5; 325 MG/1; MG/1
1 TABLET ORAL ONCE
Status: COMPLETED | OUTPATIENT
Start: 2021-06-22 | End: 2021-06-22

## 2021-06-22 RX ORDER — SULFAMETHOXAZOLE AND TRIMETHOPRIM 800; 160 MG/1; MG/1
1 TABLET ORAL 2 TIMES DAILY
Qty: 20 TABLET | Refills: 0 | Status: SHIPPED | OUTPATIENT
Start: 2021-06-22 | End: 2021-06-23 | Stop reason: ALTCHOICE

## 2021-06-22 RX ORDER — HYDROCODONE BITARTRATE AND ACETAMINOPHEN 5; 325 MG/1; MG/1
1 TABLET ORAL EVERY 6 HOURS PRN
Qty: 10 TABLET | Refills: 0 | Status: SHIPPED | OUTPATIENT
Start: 2021-06-22 | End: 2021-06-23 | Stop reason: ALTCHOICE

## 2021-06-22 RX ORDER — CEPHALEXIN 250 MG/1
500 CAPSULE ORAL ONCE
Status: COMPLETED | OUTPATIENT
Start: 2021-06-22 | End: 2021-06-22

## 2021-06-22 RX ORDER — SULFAMETHOXAZOLE AND TRIMETHOPRIM 800; 160 MG/1; MG/1
1 TABLET ORAL ONCE
Status: COMPLETED | OUTPATIENT
Start: 2021-06-22 | End: 2021-06-22

## 2021-06-22 RX ORDER — CEPHALEXIN 500 MG/1
500 CAPSULE ORAL 4 TIMES DAILY
Qty: 40 CAPSULE | Refills: 0 | Status: SHIPPED | OUTPATIENT
Start: 2021-06-22 | End: 2021-06-23 | Stop reason: ALTCHOICE

## 2021-06-22 RX ADMIN — CEPHALEXIN 500 MG: 250 CAPSULE ORAL at 04:05

## 2021-06-22 RX ADMIN — SULFAMETHOXAZOLE AND TRIMETHOPRIM 1 TABLET: 800; 160 TABLET ORAL at 04:06

## 2021-06-22 RX ADMIN — OXYCODONE HYDROCHLORIDE AND ACETAMINOPHEN 1 TABLET: 5; 325 TABLET ORAL at 04:06

## 2021-06-22 ASSESSMENT — PAIN DESCRIPTION - LOCATION: LOCATION: BUTTOCKS

## 2021-06-22 ASSESSMENT — ENCOUNTER SYMPTOMS
ABDOMINAL PAIN: 0
SORE THROAT: 0
EYE DISCHARGE: 0
EYE PAIN: 0
NAUSEA: 0
COLOR CHANGE: 0
SHORTNESS OF BREATH: 0
COUGH: 0
BACK PAIN: 0
VOMITING: 0
RHINORRHEA: 0

## 2021-06-22 ASSESSMENT — PAIN DESCRIPTION - DESCRIPTORS: DESCRIPTORS: ACHING

## 2021-06-22 ASSESSMENT — PAIN DESCRIPTION - PROGRESSION: CLINICAL_PROGRESSION: GRADUALLY WORSENING

## 2021-06-22 ASSESSMENT — PAIN DESCRIPTION - FREQUENCY: FREQUENCY: INTERMITTENT

## 2021-06-22 ASSESSMENT — PAIN DESCRIPTION - ORIENTATION: ORIENTATION: LEFT

## 2021-06-22 ASSESSMENT — PAIN DESCRIPTION - PAIN TYPE: TYPE: ACUTE PAIN

## 2021-06-22 ASSESSMENT — PAIN DESCRIPTION - ONSET: ONSET: GRADUAL

## 2021-06-22 ASSESSMENT — PAIN SCALES - GENERAL: PAINLEVEL_OUTOF10: 8

## 2021-06-22 NOTE — ED PROVIDER NOTES
[address] ENCOUNTER      Pt Name: Jason Harmon  MRN: [id number]  [place] 1985  Date of evaluation: 6/22/2021  Provider: Valeria Taylor MD    CHIEF COMPLAINT       Chief Complaint   Patient presents with   [facility] [address]      Jason Harmon is a 30 y.o. female who presents to the emergency department  for   Chief Complaint   Patient presents with    Insect Bite       60-year-old female presents for evaluation of a wound with surrounding erythema on her left buttock. She believes she might have been bit by a brown recluse spider about 3 or 4 days ago. She states she has \"brown recluse spiders in her basement before. She has had progressive pain and redness at the site of this wound. She denies any fever chills. No abdominal pain. No nausea or vomiting. Denies any bruising. Denies any lightheadedness. No chest pain or shortness of breath. She does endorse pain with sitting down. She states that the area has drained some bloody clear fluid. Denies any remarkable purulent drainage. She is alert known to the emergency department. GCS of 15. She has not identified exacerbating alleviating factors. Nursing Notes, Triage Notes & Vital Signs were reviewed. REVIEW OF SYSTEMS    (2-9 systems for level 4, 10 or more for level 5)     Review of Systems   Constitutional: Negative for chills and fever. HENT: Negative for congestion, rhinorrhea and sore throat. Eyes: Negative for pain and discharge. Respiratory: Negative for cough and shortness of breath. Cardiovascular: Negative for chest pain and palpitations. Gastrointestinal: Negative for abdominal pain, nausea and vomiting. Endocrine: Negative for polydipsia and polyuria. Genitourinary: Negative for dysuria and flank pain. Musculoskeletal: Negative for back pain and neck pain.    Skin: Positive for wound. Negative for color change. Neurological: Negative for dizziness, facial asymmetry, light-headedness, numbness and headaches. Psychiatric/Behavioral: Negative for confusion. Except as noted above the remainder of the review of systems was reviewed and negative. PAST MEDICAL HISTORY     Past Medical History:   Diagnosis Date    Anxiety disorder     Arthritis     Asthma     Follows with Lizbeth    Bipolar disorder ([facility] [address]     Not on meds as of 12/3/19    Depression     currently not on meds (12/3/19)    Drug [facility]     states previous addiction to vicodin (2011), denies current use. Hx + UDS for cocaine, 12/3/2019    Family history of heart disease     \"had to see Dr Juan Alberto Restrepo and he said every thing ok- approx 2 yrs ago \"    H/O echocardiogram 12/19/2018    Technically difficult examination due to body habitus. Left ventricular function is low normal, EF is estimated at 45-50%. Mild concentric left ventricular hypertrophy. Mildly dilated left atrium. Right ventricular systolic pressure of 29 mm Hg. Mild tricuspid regurgitation, no evidence of any pericardial effusion.  [id number](542.4)     Last headache:  12/2/19    History of exercise stress test 05/04/2017    treadmill    Miscarriage     x4    Pain management     Was Dr. Amanda iVlla - left his practice 11/2019    Prolonged emergence from general anesthesia     \"with last surgery had trouble keeping me awake and ended up admitted me- and oxygen level kept going down    Sleep apnea     sleep study 2019- has cpap does not wear daily    Wears glasses        Prior to Admission medications    Medication Sig Start Date End Date Taking?  Authorizing Provider   cephALEXin (KEFLEX) 500 MG capsule Take 1 capsule by mouth 4 times daily for 10 days 6/22/21 7/2/21 Yes Mignon Mattson MD   sulfamethoxazole-trimethoprim (BACTRIM DS) 800-160 MG per tablet Take 1 tablet by mouth 2 times daily for 10 days 6/22/21 7/2/21 Yes Suzi Oneil Eli Sargent MD   HYDROcodone-acetaminophen (NORCO) 5-325 MG per tablet Take 1 tablet by mouth every 6 hours as needed for Pain for up to 3 days. Intended supply: 3 days. Take lowest dose possible to manage pain 6/22/21 6/25/21 Yes Chaitanya Helms MD   butalbital-acetaminophen-caffeine (FIORICET, [facility]) -59 MG per tablet Take 1 tablet by mouth every 4 hours as needed for Headaches 5/12/21   NADIR Szymanski   cetirizine (ZYRTEC) 10 MG tablet Take 1 tablet by mouth daily 5/12/21   NADIR Szymanski   acetaminophen (TYLENOL) 500 MG tablet Take 1 tablet by mouth every 6 hours as needed for Pain 3/9/21 3/16/21  Kye Soriano DO   ibuprofen (ADVIL;MOTRIN) 600 MG tablet Take 1 tablet by mouth every 6 hours as needed for Pain 3/9/21 3/16/21  Kye Soriano DO   baclofen (LIORESAL) 10 MG tablet Take 1 tablet by mouth 3 times daily 3/9/21   Kye Soriano DO   fluticasone-vilanterol (BREO ELLIPTA) 100-25 MCG/INH AEPB inhaler Inhale 1 puff into the lungs daily 11/3/20   Jeanne Vogt MD   guaiFENesin (MUCINEX) 600 MG extended release tablet Take 1 tablet by mouth 2 times daily 11/3/20   Jeanne Vogt MD   hydrOXYzine (VISTARIL) 25 MG capsule  9/30/20   Historical Provider, MD   lisdexamfetamine (VYVANSE) 50 MG capsule Take 50 mg by mouth every morning. Historical Provider, MD   propranolol (INDERAL) 20 MG tablet Take 20 mg by mouth 3 times daily    Historical Provider, MD   albuterol sulfate  (90 Base) MCG/ACT inhaler Inhale 2 puffs into the lungs 4 times daily 4/16/19   Demar Pack MD   Elastic Bandages & Supports (KNEE BRACE/CUSHION/L-XL) MISC Please dispense one knee brace to be worn for 8 hrs during the day and taken off at nighttime for right knee for comfort.  9/5/18   Donald Yanes PA-C        Patient Active Problem List   Diagnosis    Pregnancy with poor obstetric history    Obesity complicating pregnancy in third trimester    Back pain in pregnancy    Chest pain    Morbid obesity with BMI of 50.0-59.9, adult ([facility])    Mild intermittent asthma without complication    Shortness of breath on exertion    BASHIR (obstructive sleep apnea)    Drug-induced constipation    Diarrhea    Arthritis    Bilateral chronic knee pain    Pain of both hip joints    Chronic bilateral low back pain without sciatica    Episodic tension-type headache, not intractable    Nausea and vomiting in adult    Reactive depression    Anxiety    Panic attack    Secondary hypertension    GOGO (stress urinary incontinence, female)    Ventral hernia without obstruction or gangrene    Incarcerated ventral hernia    S/P ventral herniorrhaphy         SURGICAL HISTORY       Past Surgical History:   Procedure Laterality Date    CARPAL TUNNEL RELEASE Left 10/2019    Dr. Claudette Specter Right 2016     SECTION       &    [place] AND CURETTAGE OF UTERUS       &     HERNIA REPAIR N/A 2020    HERNIA SUPRAUMBILICAL/ VENTRAL REPAIR LAPAROSCOPIC ROBOTIC WITH MESH performed by Kris Branch MD at [facility] ARTHROSCOPY Right 2019    Dr. Guerda Cordova      Several on bottom & top - no partials    TUBAL LIGATION      UPPER GASTROINTESTINAL ENDOSCOPY N/A 10/5/2020    EGD BIOPSY performed by Kris Branch MD at [facility]       Previous Medications    ACETAMINOPHEN (TYLENOL) 500 MG TABLET    Take 1 tablet by mouth every 6 hours as needed for Pain    ALBUTEROL SULFATE  (90 BASE) MCG/ACT INHALER    Inhale 2 puffs into the lungs 4 times daily    BACLOFEN (LIORESAL) 10 MG TABLET    Take 1 tablet by mouth 3 times daily    BUTALBITAL-ACETAMINOPHEN-CAFFEINE (FIORICET, ESGIC) -40 MG PER TABLET    Take 1 tablet by mouth every 4 hours as needed for Headaches    CETIRIZINE (ZYRTEC) 10 MG TABLET    Take 1 tablet by mouth daily    ELASTIC BANDAGES & SUPPORTS (KNEE BRACE/CUSHION/L-XL) MISC    Please dispense one knee brace to be worn for 8 hrs during the day and taken off at nighttime for right knee for comfort. FLUTICASONE-VILANTEROL (BREO ELLIPTA) 100-25 MCG/INH AEPB INHALER    Inhale 1 puff into the lungs daily    GUAIFENESIN (MUCINEX) 600 MG EXTENDED RELEASE TABLET    Take 1 tablet by mouth 2 times daily    HYDROXYZINE (VISTARIL) 25 MG CAPSULE        IBUPROFEN (ADVIL;MOTRIN) 600 MG TABLET    Take 1 tablet by mouth every 6 hours as needed for Pain    LISDEXAMFETAMINE (VYVANSE) 50 MG CAPSULE    Take 50 mg by mouth every morning.     PROPRANOLOL (INDERAL) 20 MG TABLET    Take 20 mg by mouth 3 times daily       ALLERGIES     Latex, Shellfish-derived products, and Ultram [tramadol hcl]    FAMILY HISTORY       Family History   Problem Relation Age of Onset    Diabetes Mother     High Blood Pressure Mother     Heart Disease Mother     Heart Disease Maternal Grandmother     Heart Disease Maternal Grandfather           SOCIAL HISTORY       Social History     Socioeconomic History    Marital status: Single     Spouse name: Not on file    Number of children: Not on file    Years of education: Not on file    Highest education level: Not on file   Occupational History    Not on file   Tobacco Use    Smoking status: Current Every Day Smoker     Packs/day: 1.00     Years: 18.00     Pack years: 18.00     Types: Cigarettes     Start date: 2001    Smokeless tobacco: Never Used   Vaping Use    Vaping Use: Never used   Substance and Sexual Activity    Alcohol use: No    Drug use: Not Currently     Types: Marijuana     Comment: Last used: 2005/ per pt on 10/1/2020\"last used cocaine 2019    Sexual activity: Yes     Partners: Male   Other Topics Concern    Not on file   Social History Narrative    Not on file     Social Determinants of Health     Financial Resource Strain:     Difficulty of Paying Living Expenses:    Food Insecurity:     Worried About Running Out of Food in the Last Year:    [address] in the Last Year:    Transportation Needs:     Lack of Transportation (Medical):  Lack of Transportation (Non-Medical):    Physical Activity:     Days of Exercise per Week:     Minutes of Exercise per Session:    Stress:     Feeling of Stress :    Social Connections:     Frequency of Communication with Friends and Family:     Frequency of Social Gatherings with Friends and Family:     Attends [employer] Services:     Active Member of Clubs or Organizations:     Attends Club or Organization Meetings:     Marital Status:    Intimate Partner Violence:     Fear of Current or Ex-Partner:     Emotionally Abused:     Physically Abused:     Sexually Abused:        SCREENINGS    [place]arl Coma Scale  Eye Opening: Spontaneous  Best Verbal Response: Oriented  Best Motor Response: Obeys commands  [place] Coma Scale Score: 15          PHYSICAL EXAM    (up to 7 for level 4, 8 or more for level 5)     ED Triage Vitals [06/22/21 0116]   BP Temp Temp Source Pulse Resp SpO2 Height Weight   135/81 98.6 °F (37 °C) Oral 103 18 98 % 6' (1.829 m) (!) 400 lb (181.4 kg)       Physical Exam  Vitals reviewed. Constitutional:       Appearance: She is not ill-appearing or toxic-appearing. HENT:      Head: Normocephalic and atraumatic. Nose: No congestion or rhinorrhea. Mouth/Throat:      Mouth: Mucous membranes are moist.      Pharynx: No oropharyngeal exudate or posterior oropharyngeal erythema. Eyes:      General:         Right eye: No discharge. Left eye: No discharge. Extraocular Movements: Extraocular movements intact. Pupils: Pupils are equal, round, and reactive to light. Cardiovascular:      Rate and Rhythm: Normal rate. Heart sounds: No friction rub. No gallop. Pulmonary:      Effort: Pulmonary effort is normal. No respiratory distress. Chest:      Chest wall: No tenderness.    Abdominal:      Palpations: Abdomen is soft.      Tenderness: There is no abdominal tenderness. There is no guarding. Musculoskeletal:         General: Normal range of motion. Cervical back: Normal range of motion and neck supple. No tenderness. Skin:     Capillary Refill: Capillary refill takes less than 2 seconds. Findings: Erythema present. Comments: Indurated nodule with necrotic center with about 4cm of surrounding erythema; no fluctuance noted; no active drainage noted; Neurological:      General: No focal deficit present. Mental Status: She is alert and oriented to person, place, and time. DIAGNOSTIC RESULTS     Labs Reviewed   COMPREHENSIVE METABOLIC PANEL W/ REFLEX TO MG FOR LOW K - Abnormal; Notable for the following components:       Result Value    Glucose 104 (*)     All other components within normal limits   CBC WITH AUTO DIFFERENTIAL - Abnormal; Notable for the following components:    MCHC 31.6 (*)     Segs Relative 68.3 (*)     Lymphocytes % 21.4 (*)     Monocytes % 6.7 (*)     All other components within normal limits          RADIOLOGY:     Non-plain film images such as CT, Ultrasound and MRI are read by the radiologist. Plain radiographic images are visualized and preliminarily interpreted by the emergency physician. Interpretation per the Radiologist below, if available at the time of this note:    CT PELVIS WO CONTRAST Additional Contrast? None   Preliminary Result   1. Soft tissue swelling with skin thickening in the left gluteal region most   compatible with cellulitis. No drainable abscess. No subcutaneous gas. 2. No acute intrapelvic abnormality.                ED BEDSIDE ULTRASOUND:   Performed by ED Physician Laure Etienne MD       LABS:  Labs Reviewed   COMPREHENSIVE METABOLIC PANEL W/ REFLEX TO MG FOR LOW K - Abnormal; Notable for the following components:       Result Value    Glucose 104 (*)     All other components within normal limits   CBC WITH AUTO DIFFERENTIAL - Abnormal; Notable for the following components:    MCHC 31.6 (*)     Segs Relative 68.3 (*)     Lymphocytes % 21.4 (*)     Monocytes % 6.7 (*)     All other components within normal limits       All other labs were within normal range or not returned as of this dictation. EMERGENCY DEPARTMENT COURSE and DIFFERENTIAL DIAGNOSIS/MDM:   Vitals:    Vitals:    06/22/21 0116 06/22/21 0320   BP: 135/81    Pulse: 103 103   Resp: 18    Temp: 98.6 °F (37 °C)    TempSrc: Oral    SpO2: 98%    Weight: (!) 400 lb (181.4 kg)    Height: 6' (1.829 m)            MDM  Number of Diagnoses or Management Options  Cellulitis of buttock  Diagnosis management comments: 41-year-old female presents for evaluation of wound with surrounding erythema on her left buttocks. She believes she might have been bit by a brown recluse spider few days ago. She states she has killed brown recluse spiders in her basement before. She endorses some progressive pain at the site. No other remarkable symptoms. She presents with mild tachycardia. Vitals otherwise unremarkable. On exam she does have about a 4 cm area of erythema with an indurated nodule with a necrotic center. No active drainage noted. Did obtain labs which are nonacute. CBC was unremarkable. Platelets were normal, hemoglobin was normal.  No leukocytosis. She has no clinical or laboratory signs of any systemic envenomation from the possible brown recluse bite. CT was also obtained. CT does not show any abscess. Is consistent with cellulitis. She is treated with pain medications and antibiotics in the emergency department. She will continue that treatment course outpatient. She will follow her symptoms outpatient. She is given strict return precautions for worsening or concerning symptoms. She is discharged in stable condition.        Amount and/or Complexity of Data Reviewed  Tests in the radiology section of CPT®: reviewed        -  Patient seen and evaluated in the emergency department. -  Triage and nursing notes reviewed and incorporated. -  Old chart records reviewed and incorporated. -  Work-up included:  See above  -  Results discussed with patient. CONSULTS:  None    PROCEDURES:  None performed unless otherwise noted below     Procedures        FINAL IMPRESSION      1. Cellulitis of buttock          DISPOSITION/PLAN   DISPOSITION Decision To Discharge 06/22/2021 05:23:44 AM      PATIENT REFERRED TO:  Trang Tanner MD  [address]  [id number]  Obi Rousseau  [phone]    Schedule an appointment as soon as possible for a visit in 2 days        DISCHARGE MEDICATIONS:  New Prescriptions    CEPHALEXIN (KEFLEX) 500 MG CAPSULE    Take 1 capsule by mouth 4 times daily for 10 days    HYDROCODONE-ACETAMINOPHEN (NORCO) 5-325 MG PER TABLET    Take 1 tablet by mouth every 6 hours as needed for Pain for up to 3 days. Intended supply: 3 days. Take lowest dose possible to manage pain    SULFAMETHOXAZOLE-TRIMETHOPRIM (BACTRIM DS) 800-160 MG PER TABLET    Take 1 tablet by mouth 2 times daily for 10 days       ED Provider Disposition Time  DISPOSITION Decision To Discharge 06/22/2021 05:23:44 AM      Appropriate personal protective equipment was worn during the patient's evaluation. These included surgical, eye protection, surgical mask or in 95 respirator and gloves. The patient was also placed in a surgical mask for the prevention of possible spread of respiratory viral illnesses. The Patient was instructed to read the package inserts with any medication that was prescribed. Major potential reactions and medication interactions were discussed. The Patient understands that there are numerous possible adverse reactions not covered. The patient was also instructed to arrange follow-up with his or her primary care provider for review of any pending labwork or incidental findings on any radiology results that were obtained.   All efforts were made to discuss any incidental findings that require further monitoring. Controlled Substances Monitoring:     No flowsheet data found.     (Please note that portions of this note were completed with a voice recognition program.  Efforts were made to edit the dictations but occasionally words are mis-transcribed.)    Elayne Santillan MD (electronically signed)  Attending Emergency Physician           Elayne Santillan MD  06/22/21 [id number]

## 2021-06-23 ENCOUNTER — HOSPITAL ENCOUNTER (EMERGENCY)
Age: 36
Discharge: HOME | End: 2021-06-23
Attending: EMERGENCY MEDICINE
Payer: COMMERCIAL

## 2021-06-23 VITALS
DIASTOLIC BLOOD PRESSURE: 66 MMHG | RESPIRATION RATE: 18 BRPM | OXYGEN SATURATION: 97 % | TEMPERATURE: 97.3 F | HEART RATE: 71 BPM | SYSTOLIC BLOOD PRESSURE: 97 MMHG

## 2021-06-23 DIAGNOSIS — L02.31 ABSCESS OF BUTTOCK: ICD-10-CM

## 2021-06-23 DIAGNOSIS — L02.91 ABSCESS: Primary | ICD-10-CM

## 2021-06-23 PROCEDURE — 2500000003 HC RX 250 WO HCPCS: Performed by: EMERGENCY MEDICINE

## 2021-06-23 PROCEDURE — 6370000000 HC RX 637 (ALT 250 FOR IP): Performed by: EMERGENCY MEDICINE

## 2021-06-23 PROCEDURE — 10061 I&D ABSCESS COMP/MULTIPLE: CPT

## 2021-06-23 PROCEDURE — 99284 EMERGENCY DEPT VISIT MOD MDM: CPT

## 2021-06-23 RX ORDER — CLINDAMYCIN HYDROCHLORIDE 150 MG/1
450 CAPSULE ORAL 3 TIMES DAILY
Qty: 90 CAPSULE | Refills: 0 | Status: SHIPPED | OUTPATIENT
Start: 2021-06-23 | End: 2021-07-03

## 2021-06-23 RX ORDER — CLINDAMYCIN HYDROCHLORIDE 150 MG/1
300 CAPSULE ORAL ONCE
Status: COMPLETED | OUTPATIENT
Start: 2021-06-23 | End: 2021-06-23

## 2021-06-23 RX ORDER — ACETAMINOPHEN 500 MG
500 TABLET ORAL EVERY 6 HOURS PRN
Qty: 120 TABLET | Refills: 0 | Status: SHIPPED | OUTPATIENT
Start: 2021-06-23 | End: 2022-03-11 | Stop reason: ALTCHOICE

## 2021-06-23 RX ORDER — LIDOCAINE HYDROCHLORIDE AND EPINEPHRINE 10; 10 MG/ML; UG/ML
20 INJECTION, SOLUTION INFILTRATION; PERINEURAL ONCE
Status: COMPLETED | OUTPATIENT
Start: 2021-06-23 | End: 2021-06-23

## 2021-06-23 RX ORDER — NAPROXEN 500 MG/1
500 TABLET ORAL 2 TIMES DAILY WITH MEALS
Qty: 180 TABLET | Refills: 1 | Status: SHIPPED | OUTPATIENT
Start: 2021-06-23

## 2021-06-23 RX ADMIN — LIDOCAINE HYDROCHLORIDE AND EPINEPHRINE 20 ML: 10; 10 INJECTION, SOLUTION INFILTRATION; PERINEURAL at 21:53

## 2021-06-23 RX ADMIN — Medication 3 ML: at 21:54

## 2021-06-23 RX ADMIN — CLINDAMYCIN HYDROCHLORIDE 300 MG: 150 CAPSULE ORAL at 21:53

## 2021-06-23 ASSESSMENT — PAIN DESCRIPTION - DESCRIPTORS: DESCRIPTORS: STABBING;ACHING;BURNING

## 2021-06-23 ASSESSMENT — PAIN DESCRIPTION - ORIENTATION: ORIENTATION: LEFT

## 2021-06-23 ASSESSMENT — PAIN DESCRIPTION - FREQUENCY: FREQUENCY: CONTINUOUS

## 2021-06-23 ASSESSMENT — PAIN DESCRIPTION - LOCATION: LOCATION: BUTTOCKS

## 2021-06-23 ASSESSMENT — PAIN SCALES - GENERAL: PAINLEVEL_OUTOF10: 10

## 2021-06-24 ASSESSMENT — ENCOUNTER SYMPTOMS
FACIAL SWELLING: 0
COUGH: 0
CHEST TIGHTNESS: 0
SORE THROAT: 0
SHORTNESS OF BREATH: 0
GASTROINTESTINAL NEGATIVE: 1
WHEEZING: 0
SINUS PRESSURE: 0
VOMITING: 0
ABDOMINAL PAIN: 0
COLOR CHANGE: 1
NAUSEA: 0
RESPIRATORY NEGATIVE: 1

## 2021-06-24 NOTE — ED PROVIDER NOTES
[address]      Pt Name: Luann Olivo  MRN: [id number]  [place] 1985  Date of evaluation: 6/23/2021  Provider: Amber Caceres DO    CHIEF COMPLAINT       Chief Complaint   Patient presents with    Cellulitis    Insect Bite         HISTORY OF PRESENT ILLNESS      Luann Olivo is a 30 y.o. female who presents to the emergency department  for   Chief Complaint   Patient presents with    Cellulitis    Insect Bite       78-year-old female presents emergency department chief complaint of left-sided buttock skin changes and possible abscess. Patient was seen on 19 June for the same symptoms after an insect versus spider bite. Patient was discharged with Bactrim and Keflex at that time. Patient reports that pain and redness have worsened and patient now has drainage from the site. Patient denies other associated symptoms. The history is provided by the patient and medical records. No  was used. Nursing Notes, Triage Notes & Vital Signs were reviewed. REVIEW OF SYSTEMS    (2-9 systems for level 4, 10 or more for level 5)     Review of Systems   Constitutional: Negative. Negative for chills, fatigue and fever. HENT: Negative. Negative for facial swelling, sinus pressure and sore throat. Respiratory: Negative. Negative for cough, chest tightness, shortness of breath and wheezing. Cardiovascular: Negative. Negative for chest pain and palpitations. Gastrointestinal: Negative. Negative for abdominal pain, nausea and vomiting. Genitourinary: Negative. Negative for dysuria, flank pain, frequency, hematuria and urgency. Musculoskeletal: Negative. Negative for arthralgias and myalgias. Skin: Positive for color change, rash and wound. Neurological: Negative. Negative for dizziness, speech difficulty, light-headedness, numbness and headaches. Psychiatric/Behavioral: Negative.   Negative for agitation and confusion. The patient is not nervous/anxious. All other systems reviewed and are negative. Except as noted above the remainder of the review of systems was reviewed and negative. PAST MEDICAL HISTORY     Past Medical History:   Diagnosis Date    Anxiety disorder     Arthritis     Asthma     Follows with Lizbeth    Bipolar disorder ([facility] [address]     Not on meds as of 12/3/19    Depression     currently not on meds (12/3/19)    Drug addiction ([facility] [address]     states previous addiction to vicodin (2011), denies current use. Hx + UDS for cocaine, 12/3/2019    Family history of heart disease     \"had to see Dr Bro Anthony and he said every thing ok- approx 2 yrs ago \"    H/O echocardiogram 12/19/2018    Technically difficult examination due to body habitus. Left ventricular function is low normal, EF is estimated at 45-50%. Mild concentric left ventricular hypertrophy. Mildly dilated left atrium. Right ventricular systolic pressure of 29 mm Hg. Mild tricuspid regurgitation, no evidence of any pericardial effusion.  [id number](547.0)     Last headache:  12/2/19    History of exercise stress test 05/04/2017    treadmill    Miscarriage     x4    Pain management     Was Dr. Isela Moise - left his practice 11/2019    Prolonged emergence from general anesthesia     \"with last surgery had trouble keeping me awake and ended up admitted me- and oxygen level kept going down    Sleep apnea     sleep study 2019- has cpap does not wear daily    Wears glasses        Prior to Admission medications    Medication Sig Start Date End Date Taking?  Authorizing Provider   acetaminophen (TYLENOL) 500 MG tablet Take 1 tablet by mouth every 6 hours as needed for Pain or Fever 6/23/21  Yes Dave Strauss DO   clindamycin (CLEOCIN) 150 MG capsule Take 3 capsules by mouth 3 times daily for 10 days 6/23/21 7/3/21 Yes Dave Strauss DO   mupirocin [facility]) 2 % ointment Apply 3 times daily 6/23/21 6/30/21 Yes Prisca Taylor Nausea and vomiting in adult    Reactive depression    Anxiety    Panic attack    Secondary hypertension    GOGO (stress urinary incontinence, female)    Ventral hernia without obstruction or gangrene    Incarcerated ventral hernia    S/P ventral herniorrhaphy         SURGICAL HISTORY       Past Surgical History:   Procedure Laterality Date    CARPAL TUNNEL RELEASE Left 10/2019    Dr. Claudia Banerjee Right 2016     SECTION       &     DILATION AND CURETTAGE OF UTERUS       &     HERNIA REPAIR N/A 2020    HERNIA SUPRAUMBILICAL/ VENTRAL REPAIR LAPAROSCOPIC ROBOTIC WITH MESH performed by Tatianna Moser MD at [facility] ARTHROSCOPY Right 2019    Dr. Tanya Ruiz      Several on bottom & top - no partials    TUBAL LIGATION      UPPER GASTROINTESTINAL ENDOSCOPY N/A 10/5/2020    EGD BIOPSY performed by Tatianna Moser MD at [facility]       Discharge Medication List as of 2021 11:19 PM      CONTINUE these medications which have NOT CHANGED    Details   hydrOXYzine (VISTARIL) 25 MG capsule Historical Med      lisdexamfetamine (VYVANSE) 50 MG capsule Take 50 mg by mouth every morning. Historical Med      propranolol (INDERAL) 20 MG tablet Take 20 mg by mouth 3 times dailyHistorical Med      cetirizine (ZYRTEC) 10 MG tablet Take 1 tablet by mouth daily, Disp-30 tablet, R-0Normal      ibuprofen (ADVIL;MOTRIN) 600 MG tablet Take 1 tablet by mouth every 6 hours as needed for Pain, Disp-28 tablet, R-0Normal      baclofen (LIORESAL) 10 MG tablet Take 1 tablet by mouth 3 times daily, Disp-30 tablet, R-0Normal      fluticasone-vilanterol (BREO ELLIPTA) 100-25 MCG/INH AEPB inhaler Inhale 1 puff into the lungs daily, Disp-1 each,R-3Normal      guaiFENesin (MUCINEX) 600 MG extended release tablet Take 1 tablet by mouth 2 times daily, Disp-60 tablet,R-5Normal      albuterol sulfate HFA Social Gatherings with Friends and Family:     Attends Sabianist Services:     Active Member of Clubs or Organizations:     Attends Club or Organization Meetings:     Marital Status:    Intimate Partner Violence:     Fear of Current or Ex-Partner:     Emotionally Abused:     Physically Abused:     Sexually Abused:        SCREENINGS    [place] Coma Scale  Eye Opening: Spontaneous  Best Verbal Response: Oriented  Best Motor Response: Obeys commands  [place] Coma Scale Score: 15          PHYSICAL EXAM    (up to 7 for level 4, 8 or more for level 5)     ED Triage Vitals [06/23/21 2130]   BP Temp Temp src Pulse Resp SpO2 Height Weight   97/66 97.3 °F (36.3 °C) -- 71 18 97 % -- --       Physical Exam  Vitals and nursing note reviewed. Constitutional:       General: She is not in acute distress. Appearance: She is well-developed. She is not diaphoretic. HENT:      Head: Normocephalic and atraumatic. Right Ear: External ear normal.      Left Ear: External ear normal.      Nose: Nose normal.      Mouth/Throat:      Pharynx: No oropharyngeal exudate. Eyes:      General: No scleral icterus. Right eye: No discharge. Left eye: No discharge. Pupils: Pupils are equal, round, and reactive to light. Neck:      Thyroid: No thyromegaly. Vascular: No JVD. Trachea: No tracheal deviation. Cardiovascular:      Rate and Rhythm: Normal rate and regular rhythm. Pulses: Normal pulses. Heart sounds: Normal heart sounds. No murmur heard. No friction rub. No gallop. Pulmonary:      Effort: Pulmonary effort is normal. No respiratory distress. Breath sounds: Normal breath sounds. No stridor. No wheezing or rales. Chest:      Chest wall: No tenderness. Abdominal:      General: Bowel sounds are normal. There is no distension. Palpations: Abdomen is soft. There is no mass. Musculoskeletal:         General: No tenderness or deformity. Normal range of motion. Cervical back: Normal range of motion. Skin:     General: Skin is warm. Capillary Refill: Capillary refill takes less than 2 seconds. Coloration: Skin is not pale. Findings: Erythema, lesion and rash present. Neurological:      Mental Status: She is alert and oriented to person, place, and time. Cranial Nerves: No cranial nerve deficit. Motor: No abnormal muscle tone. Coordination: Coordination normal.   Psychiatric:         Mood and Affect: Mood normal.         Behavior: Behavior normal.         Thought Content: Thought content normal.         Judgment: Judgment normal.         DIAGNOSTIC RESULTS     Labs Reviewed - No data to display       EKG: All EKG's are interpreted by the Emergency Department Physician who either signs or Co-signs this chart in the absence of a cardiologist.       EKG Interpretation    Interpreted by emergency department physician        Migue Schneider DO     RADIOLOGY:     Non-plain film images such as CT, Ultrasound and MRI are read by the radiologist. Plain radiographic images are visualized and preliminarily interpreted by the emergency physician. Interpretation per the Radiologist below, if available at the time of this note:    No orders to display         ED BEDSIDE ULTRASOUND:   Performed by ED Physician Migue Schneider DO       LABS:  Labs Reviewed - No data to display    All other labs were within normal range or not returned as of this dictation. EMERGENCY DEPARTMENT COURSE and DIFFERENTIAL DIAGNOSIS/MDM:   Vitals:    Vitals:    06/23/21 2130   BP: 97/66   Pulse: 71   Resp: 18   Temp: 97.3 °F (36.3 °C)   SpO2: 97%           MDM  Number of Diagnoses or Management Options  Abscess of buttock  Abscess  Diagnosis management comments: 78-year-old female presents emergency department with chief complaint of skin changes, pain and lesion with drainage to the left buttocks.   Patient was diagnosed with cellulitis 3 days ago and discharged with antibiotics. Abscess is formed at this time. Incision and drainage was made with moderate purulent drainage expressed. Will change placement to clindamycin and mupirocin. Patient was given nonadherent dressings. Patient is to follow-up with primary care every 48-72 hours for wound recheck. Patient tolerated the procedure well. Discharged home as noted. Amount and/or Complexity of Data Reviewed  Clinical lab tests: ordered and reviewed    Risk of Complications, Morbidity, and/or Mortality  Presenting problems: moderate  Diagnostic procedures: moderate  Management options: moderate    Critical Care  Total time providing critical care: < 30 minutes    Patient Progress  Patient progress: improved      -  Patient seen and evaluated in the emergency department. -  Triage and nursing notes reviewed and incorporated. -  Old chart records reviewed and incorporated. -  Work-up included:  See above  -  Results discussed with patient. REASSESSMENT          CRITICAL CARE TIME     This excludes seperately billable procedures and family discussion time. Critical care time provided for obtaining history, conducting a physical exam, performing and monitoring interventions, ordering, collecting and interpreting tests, and establishing medical decision-making. There was a potential for life/limb threatening pathology requiring close evaluation and intervention with concern for patient decompensation.     CONSULTS:  None    PROCEDURES:  None performed unless otherwise noted below     Incision/Drainage    Date/Time: 6/24/2021 2:27 AM  Performed by: Jackson Heredia DO  Authorized by: Jackson Heredia DO     Consent:     Consent obtained:  Verbal    Consent given by:  Patient    Risks discussed:  Bleeding, incomplete drainage, pain, infection and damage to other organs    Alternatives discussed:  No treatment, delayed treatment and observation  Location:     Type:  Abscess    Size:  4    Location:  Trunk  Anesthesia was instructed to read the package inserts with any medication that was prescribed. Major potential reactions and medication interactions were discussed. The Patient understands that there are numerous possible adverse reactions not covered. The patient was also instructed to arrange follow-up with his or her primary care provider for review of any pending labwork or incidental findings on any radiology results that were obtained. All efforts were made to discuss any incidental findings that require further monitoring. Controlled Substances Monitoring:     No flowsheet data found.     (Please note that portions of this note were completed with a voice recognition program.  Efforts were made to edit the dictations but occasionally words are mis-transcribed.)    Nga Valladares DO (electronically signed)  Attending Emergency Physician            Nga Valladares DO  06/24/21 [id number]

## 2021-10-05 ENCOUNTER — APPOINTMENT (OUTPATIENT)
Dept: CT IMAGING | Age: 36
End: 2021-10-05
Payer: COMMERCIAL

## 2021-10-05 ENCOUNTER — HOSPITAL ENCOUNTER (EMERGENCY)
Age: 36
Discharge: HOME | End: 2021-10-06
Payer: COMMERCIAL

## 2021-10-05 ENCOUNTER — APPOINTMENT (OUTPATIENT)
Dept: GENERAL RADIOLOGY | Age: 36
End: 2021-10-05
Payer: COMMERCIAL

## 2021-10-05 VITALS
OXYGEN SATURATION: 97 % | TEMPERATURE: 98.8 F | HEART RATE: 80 BPM | DIASTOLIC BLOOD PRESSURE: 85 MMHG | SYSTOLIC BLOOD PRESSURE: 137 MMHG | RESPIRATION RATE: 16 BRPM

## 2021-10-05 DIAGNOSIS — R91.1 PULMONARY NODULE: ICD-10-CM

## 2021-10-05 DIAGNOSIS — R07.9 CHEST PAIN, UNSPECIFIED TYPE: Primary | ICD-10-CM

## 2021-10-05 LAB
ALBUMIN SERPL-MCNC: 4 GM/DL (ref 3.4–5)
ALP BLD-CCNC: 70 IU/L (ref 40–129)
ALT SERPL-CCNC: 14 U/L (ref 10–40)
ANION GAP SERPL CALCULATED.3IONS-SCNC: 10 MMOL/L (ref 4–16)
AST SERPL-CCNC: 13 IU/L (ref 15–37)
BASOPHILS ABSOLUTE: 0.1 K/CU MM
BASOPHILS RELATIVE PERCENT: 0.4 % (ref 0–1)
BILIRUB SERPL-MCNC: 0.3 MG/DL (ref 0–1)
BUN BLDV-MCNC: 12 MG/DL (ref 6–23)
CALCIUM SERPL-MCNC: 8.9 MG/DL (ref 8.3–10.6)
CHLORIDE BLD-SCNC: 102 MMOL/L (ref 99–110)
CO2: 23 MMOL/L (ref 21–32)
CREAT SERPL-MCNC: 0.5 MG/DL (ref 0.6–1.1)
D DIMER: 253 NG/ML(DDU)
DIFFERENTIAL TYPE: ABNORMAL
EOSINOPHILS ABSOLUTE: 0.3 K/CU MM
EOSINOPHILS RELATIVE PERCENT: 2.7 % (ref 0–3)
GFR AFRICAN AMERICAN: >60 ML/MIN/1.73M2
GFR NON-AFRICAN AMERICAN: >60 ML/MIN/1.73M2
GLUCOSE BLD-MCNC: 88 MG/DL (ref 70–99)
HCT VFR BLD CALC: 41.9 % (ref 37–47)
HEMOGLOBIN: 13.2 GM/DL (ref 12.5–16)
IMMATURE NEUTROPHIL %: 0.3 % (ref 0–0.43)
LYMPHOCYTES ABSOLUTE: 2.2 K/CU MM
LYMPHOCYTES RELATIVE PERCENT: 18.2 % (ref 24–44)
MCH RBC QN AUTO: 29.4 PG (ref 27–31)
MCHC RBC AUTO-ENTMCNC: 31.5 % (ref 32–36)
MCV RBC AUTO: 93.3 FL (ref 78–100)
MONOCYTES ABSOLUTE: 0.8 K/CU MM
MONOCYTES RELATIVE PERCENT: 6.3 % (ref 0–4)
NUCLEATED RBC %: 0 %
PDW BLD-RTO: 12.7 % (ref 11.7–14.9)
PLATELET # BLD: 289 K/CU MM (ref 140–440)
PMV BLD AUTO: 10.1 FL (ref 7.5–11.1)
POTASSIUM SERPL-SCNC: 4.1 MMOL/L (ref 3.5–5.1)
RBC # BLD: 4.49 M/CU MM (ref 4.2–5.4)
SEGMENTED NEUTROPHILS ABSOLUTE COUNT: 8.7 K/CU MM
SEGMENTED NEUTROPHILS RELATIVE PERCENT: 72.1 % (ref 36–66)
SODIUM BLD-SCNC: 135 MMOL/L (ref 135–145)
TOTAL IMMATURE NEUTOROPHIL: 0.04 K/CU MM
TOTAL NUCLEATED RBC: 0 K/CU MM
TOTAL PROTEIN: 7.1 GM/DL (ref 6.4–8.2)
TROPONIN T: <0.01 NG/ML
WBC # BLD: 12.1 K/CU MM (ref 4–10.5)

## 2021-10-05 PROCEDURE — 99283 EMERGENCY DEPT VISIT LOW MDM: CPT

## 2021-10-05 PROCEDURE — 80053 COMPREHEN METABOLIC PANEL: CPT

## 2021-10-05 PROCEDURE — 85025 COMPLETE CBC W/AUTO DIFF WBC: CPT

## 2021-10-05 PROCEDURE — 71045 X-RAY EXAM CHEST 1 VIEW: CPT

## 2021-10-05 PROCEDURE — 93005 ELECTROCARDIOGRAM TRACING: CPT | Performed by: PHYSICIAN ASSISTANT

## 2021-10-05 PROCEDURE — 85379 FIBRIN DEGRADATION QUANT: CPT

## 2021-10-05 PROCEDURE — 84484 ASSAY OF TROPONIN QUANT: CPT

## 2021-10-05 PROCEDURE — 6360000004 HC RX CONTRAST MEDICATION: Performed by: PHYSICIAN ASSISTANT

## 2021-10-05 PROCEDURE — 6370000000 HC RX 637 (ALT 250 FOR IP): Performed by: PHYSICIAN ASSISTANT

## 2021-10-05 PROCEDURE — 71275 CT ANGIOGRAPHY CHEST: CPT

## 2021-10-05 RX ORDER — ASPIRIN 81 MG/1
324 TABLET, CHEWABLE ORAL ONCE
Status: COMPLETED | OUTPATIENT
Start: 2021-10-05 | End: 2021-10-05

## 2021-10-05 RX ADMIN — IOPAMIDOL 90 ML: 755 INJECTION, SOLUTION INTRAVENOUS at 22:41

## 2021-10-05 RX ADMIN — ASPIRIN 324 MG: 81 TABLET, CHEWABLE ORAL at 20:48

## 2021-10-05 ASSESSMENT — HEART SCORE: ECG: 0

## 2021-10-06 LAB
EKG ATRIAL RATE: 76 BPM
EKG DIAGNOSIS: NORMAL
EKG P AXIS: 15 DEGREES
EKG P-R INTERVAL: 176 MS
EKG Q-T INTERVAL: 372 MS
EKG QRS DURATION: 84 MS
EKG QTC CALCULATION (BAZETT): 418 MS
EKG R AXIS: 23 DEGREES
EKG T AXIS: 56 DEGREES
EKG VENTRICULAR RATE: 76 BPM

## 2021-10-06 PROCEDURE — 93010 ELECTROCARDIOGRAM REPORT: CPT | Performed by: INTERNAL MEDICINE

## 2021-10-06 NOTE — ED PROVIDER NOTES
eMERGENCY dEPARTMENT eNCOUnter        [address] [facility]    Chief Complaint   Patient presents with    Chest Pain       HPI    Gino Tyler is a 29 y.o. female who presents with chest pain. Onset was last night around 0300am.  Constant since onset. Localized to the left chest with radiation to the left arm. Characterized as sharp, pressure. Aggravating factors: none. Alleviating factors: none. Severity is a 10 on a scale of 0-10. Patient reports associated shortness of breath, cough, nasal congestion. She denies fever, chills, diaphoresis. Denies n/v/d. She is here with her daughter who had a positive COVID exposure at school. + smoker. No personal history of CAD, HTN, HLD, DM.  + family history of CAD. REVIEW OF SYSTEMS    Constitutional:  Denies fever, denies diaphoresis. HENT:  Denies headache, denies dizziness/lightheadedness. + nasal congestion. Respiratory:  + shortness of breath, + cough. Cardiovascular:  + chest pain. GI:  Denies abdominal pain, denies nausea/vomiting. :  Denies dysuria, frequency, urgency, urinary incontinence. Musculoskeletal:  Denies extremity swelling/pain. Integument:  Denies rashes, lesions. Neurologic:  Denies numbness/tingling. All other systems reviewed and negative. PAST MEDICAL & SURGICAL HISTORY    Past Medical History:   Diagnosis Date    Anxiety disorder     Arthritis     Asthma     Follows with Lizbeth    Bipolar disorder ([facility] [address]     Not on meds as of 12/3/19    Depression     currently not on meds (12/3/19)    Drug addiction ([facility] [address]     states previous addiction to vicodin (2011), denies current use. Hx + UDS for cocaine, 12/3/2019    Family history of heart disease     \"had to see Dr Angely Sy and he said every thing ok- approx 2 yrs ago \"    H/O echocardiogram 12/19/2018    Technically difficult examination due to body habitus. Left ventricular function is low normal, EF is estimated at 45-50%.  Mild concentric left ventricular hypertrophy. Mildly dilated left atrium. Right ventricular systolic pressure of 29 mm Hg. Mild tricuspid regurgitation, no evidence of any pericardial effusion.      MAVZFUXV(786.6)     Last headache:  19    History of exercise stress test 2017    treadmill    Miscarriage     x4    Pain management     Was Dr. Alisha Villar - left his practice 2019    Prolonged emergence from general anesthesia     \"with last surgery had trouble keeping me awake and ended up admitted me- and oxygen level kept going down    Sleep apnea     sleep study - has cpap does not wear daily    Wears glasses      Past Surgical History:   Procedure Laterality Date    CARPAL TUNNEL RELEASE Left 10/2019    Dr. Nadeem Jimenez Right 2016     SECTION      2012 & 2014   [facility]       &    [address] N/A 2020    HERNIA SUPRAUMBILICAL/ VENTRAL REPAIR LAPAROSCOPIC ROBOTIC WITH MESH performed by Laurence Vargas MD at [address] [facility] [place], [place] ARTHROSCOPY Right 2019    Dr. Vicie Primrose      Several on bottom & top - no partials    TUBAL LIGATION      UPPER GASTROINTESTINAL ENDOSCOPY N/A 10/5/2020    EGD BIOPSY performed by Laurence Vargas MD at [facility]    Current Outpatient Rx   Medication Sig Dispense Refill    acetaminophen (TYLENOL) 500 MG tablet Take 1 tablet by mouth every 6 hours as needed for Pain or Fever 120 tablet 0    naproxen (NAPROSYN) 500 MG tablet Take 1 tablet by mouth 2 times daily (with meals) 180 tablet 1    cetirizine (ZYRTEC) 10 MG tablet Take 1 tablet by mouth daily 30 tablet 0    ibuprofen (ADVIL;MOTRIN) 600 MG tablet Take 1 tablet by mouth every 6 hours as needed for Pain 28 tablet 0    baclofen (LIORESAL) 10 MG tablet Take 1 tablet by mouth 3 times daily 30 tablet 0    fluticasone-vilanterol (BREO ELLIPTA) 100-25 MCG/INH AEPB inhaler Inhale 1 puff into the lungs daily 1 each 3    guaiFENesin (MUCINEX) 600 MG extended release tablet Take 1 tablet by mouth 2 times daily 60 tablet 5    hydrOXYzine (VISTARIL) 25 MG capsule       lisdexamfetamine (VYVANSE) 50 MG capsule Take 50 mg by mouth every morning.  propranolol (INDERAL) 20 MG tablet Take 20 mg by mouth 3 times daily      albuterol sulfate  (90 Base) MCG/ACT inhaler Inhale 2 puffs into the lungs 4 times daily 1 Inhaler 5    Elastic Bandages & Supports (KNEE BRACE/CUSHION/L-XL) MISC Please dispense one knee brace to be worn for 8 hrs during the day and taken off at nighttime for right knee for comfort. 1 each 0       ALLERGIES    Allergies   Allergen Reactions    Latex Hives    Shellfish-Derived Products      \"break out like tomato and swell - trouble swallowing- with shrimp    Ultram [Tramadol Hcl] Hives       SOCIAL & FAMILY HISTORY    Social History     Socioeconomic History    Marital status: Single     Spouse name: None    Number of children: None    Years of education: None    Highest education level: None   Occupational History    None   Tobacco Use    Smoking status: Current Every Day Smoker     Packs/day: 1.00     Years: 18.00     Pack years: 18.00     Types: Cigarettes     Start date: 2001    Smokeless tobacco: Never Used   Vaping Use    Vaping Use: Never used   Substance and Sexual Activity    Alcohol use: No    Drug use: Not Currently     Types: Marijuana     Comment: Last used: 2005/ per pt on 10/1/2020\"last used cocaine 2019    Sexual activity: Yes     Partners: Male   Other Topics Concern    None   Social History Narrative    None     Social Determinants of Health     Financial Resource Strain:     Difficulty of Paying Living Expenses:    Food Insecurity:     Worried About Running Out of Food in the Last Year:     Ran Out of Food in the Last Year:    Transportation Needs:     Lack of Transportation (Medical):      Lack of Transportation (Non-Medical):    Physical Activity:     Days of Exercise per Week:     Minutes of Exercise per Session:    Stress:     Feeling of Stress :    Social Connections:     Frequency of Communication with Friends and Family:     Frequency of Social Gatherings with Friends and Family:     Attends Rastafarian Services:     Active Member of Clubs or Organizations:     Attends Club or Organization Meetings:     Marital Status:    Intimate Partner Violence:     Fear of Current or Ex-Partner:     Emotionally Abused:     Physically Abused:     Sexually Abused:      Family History   Problem Relation Age of Onset    Diabetes Mother     High Blood Pressure Mother     Heart Disease Mother     Heart Disease Maternal Grandmother     Heart Disease Maternal Grandfather        PHYSICAL EXAM    VITAL SIGNS: BP (!) 150/81   Pulse 76   Temp 98.6 °F (37 °C)   Resp 18   SpO2 98%    Constitutional:  Well developed, well nourished, no acute distress   HENT:  NC/AT. Ears, nose, mouth normal.  Neck:  Supple. Respiratory:  Lungs CTAB. Cardiovascular:  RRR. Vascular:  Distal pulses intact. GI:  Abdomen soft, non-tender. Bowel sounds active. Musculoskeletal:  No edema, no tenderness. Integument:  Warm and dry. No petechiae. Neurologic:  Alert & oriented. Psych: Pleasant affect. EKG    Please see Dr. Jose Manuel Vidal note for interpretation.     RADIOLOGY/PROCEDURES    Labs Reviewed   CBC WITH AUTO DIFFERENTIAL - Abnormal; Notable for the following components:       Result Value    WBC 12.1 (*)     MCHC 31.5 (*)     Segs Relative 72.1 (*)     Lymphocytes % 18.2 (*)     Monocytes % 6.3 (*)     All other components within normal limits   COMPREHENSIVE METABOLIC PANEL W/ REFLEX TO MG FOR LOW K - Abnormal; Notable for the following components:    CREATININE 0.5 (*)     AST 13 (*)     All other components within normal limits   D-DIMER, QUANTITATIVE - Abnormal; Notable for the following components:    D-Dimer, Quant 253 (*)     All other components within normal limits   TROPONIN     XR CHEST PORTABLE    Result Date: 10/5/2021  EXAMINATION: ONE XRAY VIEW OF THE CHEST 10/5/2021 8:11 pm COMPARISON: 05/07/2021 HISTORY: ORDERING SYSTEM PROVIDED HISTORY: cp TECHNOLOGIST PROVIDED HISTORY: Reason for exam:->cp Reason for Exam: Short of breath Acuity: Acute Type of Exam: Initial Additional signs and symptoms: fever Relevant Medical/Surgical History: asthma FINDINGS: The exam is suboptimal due to patient body habitus. The lungs are clear. Pulmonary vascularity is normal.  The cardiomediastinal silhouette is normal.     No acute abnormality. CTA PULMONARY W CONTRAST    Result Date: 10/5/2021  EXAMINATION: CTA OF THE CHEST 10/5/2021 10:40 pm TECHNIQUE: CTA of the chest was performed after the administration of intravenous contrast.  Multiplanar reformatted images are provided for review. MIP images are provided for review. Dose modulation, iterative reconstruction, and/or weight based adjustment of the mA/kV was utilized to reduce the radiation dose to as low as reasonably achievable. COMPARISON: Chest x-ray 10/05/2021 HISTORY: ORDERING SYSTEM PROVIDED HISTORY: cp, +ddimer TECHNOLOGIST PROVIDED HISTORY: Reason for exam:->cp, +ddimer Decision Support Exception - unselect if not a suspected or confirmed emergency medical condition->Emergency Medical Condition (MA) Reason for Exam: cp, +ddimer Acuity: Acute Type of Exam: Initial Additional signs and symptoms: pain radiating into left upper extremity FINDINGS: Pulmonary Arteries: Main pulmonary artery is borderline size measuring 3 cm. This could suggestive of underlying developing pulmonary hypertension. No central lobar emboli identified. Segmental subsegmental branches not well evaluated due to motion artifact and patient body habitus. Mediastinum: Lungs/pleura: Patchy mosaic attenuation lungs could suggest component of air trapping. No focal airspace opacity, pleural effusion pneumothorax. .There is a right lower lobe solid nodule measuring 16 x 9 mm mm in size with slight spiculated margins. Upper Abdomen: Hypoattenuation liver can be seen with fatty infiltration. Soft Tissues/Bones: Moderate degenerate change involving the midthoracic spine with prominent disc osteophyte complexes identified. These result in least mild canal narrowing. No central or lobar pulmonary emboli identified. Distal branches suboptimally evaluated due to motion and body habitus. 1.3 cm average dimension nodule right lower lobe with minimal spiculated margins. . Consider a non-contrast Chest CT at 3 months, a PET/CT, or tissue sampling. These guidelines do not apply to immunocompromised patients and patients with cancer. Follow up in patients with significant comorbidities as clinically warranted. For lung cancer screening, adhere to Lung-RADS guidelines. Reference: Radiology. 2017; 284(1):228-43. ED COURSE & MEDICAL DECISION MAKING    Pertinent Labs & Imaging studies reviewed and interpreted. (See chart for details)  -  Patient seen and evaluated in the emergency department. -  Triage and nursing notes reviewed and incorporated. -  Old chart records reviewed and incorporated. -  Supervising physician was Dr. Hina Mendez. Patient was seen independently. -  Differential diagnosis includes: ACS, CHF, MI, PE, thoracic dissection, pericarditis, pericardial effusion, pneumonia, pneumothorax, GI cause, and others. -  Work-up included: see above  -  Patient treated with aspirin in the ED.  -  Results discussed with patient. Cardiac work-up unremarkable, negative troponin, HEART score is 2 and patient presents 17 hours after onset of pain, do not feel she needs a delta troponin. D-dimer was slightly elevated. CTA shows no PE. We did discuss incidental finding of pulmonary nodule and recommendation for FU. She was also advised to quarantine with daughter due to daughter's COVID exposure and pending test results.   She is agreeable with plan of care and disposition.  -  Patient dc home. In light of current events, I did utilize appropriate PPE (including N95 and surgical face mask, safety glasses, and gloves, as recommended by the health facility/national standard best practice, during my bedside interactions with the patient. FINAL IMPRESSION    1.  Chest pain, unspecified type              (Please note that this note was completed with a voice recognition program.  Every attempt was made to edit the dictations, but inevitably there remain words that are mis-transcribed.)        Richie Alatorre PA-C  10/05/21 [id number]

## 2021-10-14 ENCOUNTER — TELEPHONE (OUTPATIENT)
Dept: BARIATRICS/WEIGHT MGMT | Age: 36
End: 2021-10-14

## 2021-10-14 NOTE — TELEPHONE ENCOUNTER
Received referral from Dr. Roger Arevalo office for [facility] for patient. Called Dr. Roger Arevalo office spoke with Bruce Dumas. Advised of patient previous attempts in our weight management program since 2017. She will discuss with Dr. Roger Arevalo and call me back to advise what exactly he suggests for patient. Awaiting return call.

## 2021-10-14 NOTE — TELEPHONE ENCOUNTER
Received call back from Dr. Derek Lawrence office. They would like patient to lose 75lbs in order to have knee surgery.

## 2021-10-17 NOTE — TELEPHONE ENCOUNTER
Received fax from central scheduling for echo. They have called x2 to get patient scheduled. I called pt and LM to call central scheduling to make echo appt. And gave number to call. Central scheduling paper scanned into media.
0

## 2021-11-19 ENCOUNTER — HOSPITAL ENCOUNTER (EMERGENCY)
Age: 36
Discharge: HOME | End: 2021-11-19
Payer: COMMERCIAL

## 2021-11-19 ENCOUNTER — APPOINTMENT (OUTPATIENT)
Dept: CT IMAGING | Age: 36
End: 2021-11-19
Payer: COMMERCIAL

## 2021-11-19 VITALS
SYSTOLIC BLOOD PRESSURE: 120 MMHG | HEART RATE: 70 BPM | HEIGHT: 72 IN | WEIGHT: 293 LBS | RESPIRATION RATE: 16 BRPM | DIASTOLIC BLOOD PRESSURE: 62 MMHG | TEMPERATURE: 97.6 F | OXYGEN SATURATION: 96 % | BODY MASS INDEX: 39.68 KG/M2

## 2021-11-19 DIAGNOSIS — S01.81XA FACIAL LACERATION, INITIAL ENCOUNTER: ICD-10-CM

## 2021-11-19 DIAGNOSIS — S09.90XA INJURY OF HEAD, INITIAL ENCOUNTER: Primary | ICD-10-CM

## 2021-11-19 PROCEDURE — 70450 CT HEAD/BRAIN W/O DYE: CPT

## 2021-11-19 PROCEDURE — 72125 CT NECK SPINE W/O DYE: CPT

## 2021-11-19 PROCEDURE — 99285 EMERGENCY DEPT VISIT HI MDM: CPT

## 2021-11-19 PROCEDURE — 6370000000 HC RX 637 (ALT 250 FOR IP): Performed by: PHYSICIAN ASSISTANT

## 2021-11-19 PROCEDURE — 2500000003 HC RX 250 WO HCPCS: Performed by: PHYSICIAN ASSISTANT

## 2021-11-19 PROCEDURE — 6360000002 HC RX W HCPCS: Performed by: PHYSICIAN ASSISTANT

## 2021-11-19 PROCEDURE — 90471 IMMUNIZATION ADMIN: CPT | Performed by: PHYSICIAN ASSISTANT

## 2021-11-19 PROCEDURE — 90715 TDAP VACCINE 7 YRS/> IM: CPT | Performed by: PHYSICIAN ASSISTANT

## 2021-11-19 PROCEDURE — 70486 CT MAXILLOFACIAL W/O DYE: CPT

## 2021-11-19 PROCEDURE — 12013 RPR F/E/E/N/L/M 2.6-5.0 CM: CPT

## 2021-11-19 RX ORDER — HYDROCODONE BITARTRATE AND ACETAMINOPHEN 5; 325 MG/1; MG/1
1 TABLET ORAL ONCE
Status: COMPLETED | OUTPATIENT
Start: 2021-11-19 | End: 2021-11-19

## 2021-11-19 RX ORDER — LIDOCAINE HYDROCHLORIDE 20 MG/ML
5 INJECTION, SOLUTION EPIDURAL; INFILTRATION; INTRACAUDAL; PERINEURAL ONCE
Status: COMPLETED | OUTPATIENT
Start: 2021-11-19 | End: 2021-11-19

## 2021-11-19 RX ADMIN — HYDROCODONE BITARTRATE AND ACETAMINOPHEN 1 TABLET: 5; 325 TABLET ORAL at 10:03

## 2021-11-19 RX ADMIN — LIDOCAINE HYDROCHLORIDE 5 ML: 20 INJECTION, SOLUTION EPIDURAL; INFILTRATION; INTRACAUDAL at 10:04

## 2021-11-19 RX ADMIN — TETANUS TOXOID, REDUCED DIPHTHERIA TOXOID AND ACELLULAR PERTUSSIS VACCINE, ADSORBED 0.5 ML: 5; 2.5; 8; 8; 2.5 SUSPENSION INTRAMUSCULAR at 10:02

## 2021-11-19 ASSESSMENT — PAIN DESCRIPTION - LOCATION
LOCATION: HEAD
LOCATION: HEAD

## 2021-11-19 ASSESSMENT — PAIN SCALES - GENERAL
PAINLEVEL_OUTOF10: 10
PAINLEVEL_OUTOF10: 4

## 2021-11-19 ASSESSMENT — PAIN DESCRIPTION - PROGRESSION: CLINICAL_PROGRESSION: GRADUALLY IMPROVING

## 2021-11-19 ASSESSMENT — PAIN DESCRIPTION - PAIN TYPE
TYPE: ACUTE PAIN
TYPE: ACUTE PAIN

## 2021-11-19 NOTE — ED TRIAGE NOTES
Pt presents to ED for laceration to the forehead, states she tripped over a kids toy.  No LOC or blood thinners

## 2021-11-19 NOTE — ED PROVIDER NOTES
[address] [facility] COMPLAINT    Chief Complaint   Patient presents with    Laceration     forehead       This patient was not evaluated by the attending physician. I have independently evaluated this patient. HPI    Brian Duarte is a 29 y.o. female who presents with a head injury with an onset prior to arrival.   The context (mechanism) was patient states she tripped and fell over a toy falling and hitting face against a piece of furniture. Patient has associated forehead laceration. Patient is unsure the last time she had a tetanus vaccination. Patient is unsure if she lost consciousness. Patient denies taking aspirin daily or taking blood thinner. Patient denies neck pain. Patient denies chest pain, shortness of breath, abdominal pain, vomiting or diarrhea. Patient denies extremity injury. Patient denies extremity numbness. Patient denies pregnancy. REVIEW OF SYSTEMS    Constitutional:  Denies fever  Neurologic:  See HPI. Denies extremity pain, sensory changes, or weakness. Eyes:  Denies dipplopia, blurred vision, or loss visual field. Denies discharge. Ears: no ear trauma or hearing changes  Musculoskeletal:  See HPI. No upper or lower extremity injuries. Cardiac: No Chest Pain, palpitations, or Chest Injury  Respiratory:  Denies cough, shortness of breath, respiratory discomfort   GI: No vomiting. No Abdominal pain or Abdominal Injury  : No Dysuria or Hematuria   Skin:  See HPI Denies rash     All other review of systems are negative  See HPI and nursing notes for additional information         PAST MEDICAL & SURGICAL HISTORY    Past Medical History:   Diagnosis Date    Anxiety disorder     Arthritis     Asthma     Follows with Lizbeth    Bipolar disorder ([facility] [address]     Not on meds as of 12/3/19    Depression     currently not on meds (12/3/19)    Drug addiction ([facility] [address]     states previous addiction to vicodin (2011), denies current use.    Hx + UDS for cocaine, 12/3/2019    Family history of heart disease     \"had to see Dr Danya Santamaria and he said every thing ok- approx 2 yrs ago \"    H/O echocardiogram 2018    Technically difficult examination due to body habitus. Left ventricular function is low normal, EF is estimated at 45-50%. Mild concentric left ventricular hypertrophy. Mildly dilated left atrium. Right ventricular systolic pressure of 29 mm Hg. Mild tricuspid regurgitation, no evidence of any pericardial effusion.      NQGWWXQC(148.0)     Last headache:  19    History of exercise stress test 2017    treadmill    Miscarriage     x4    Pain management     Was Dr. Su Michel - left his practice 2019    Prolonged emergence from general anesthesia     \"with last surgery had trouble keeping me awake and ended up admitted me- and oxygen level kept going down    Sleep apnea     sleep study 2019- has cpap does not wear daily    Wears glasses      Past Surgical History:   Procedure Laterality Date    CARPAL TUNNEL RELEASE Left 10/2019    Dr. Camron Fisher Right 2016     SECTION       &    [address] OF UTERUS       &    [address] N/A 2020    HERNIA SUPRAUMBILICAL/ VENTRAL REPAIR LAPAROSCOPIC ROBOTIC WITH MESH performed by Bradley Guzman MD at [address] [facility] [place] [place] ARTHROSCOPY Right 2019    Dr. Carvajal Sis      Several on bottom & top - no partials    TUBAL LIGATION      UPPER GASTROINTESTINAL ENDOSCOPY N/A 10/5/2020    EGD BIOPSY performed by Bradley Guzman MD at [address]    Current Outpatient Rx   Medication Sig Dispense Refill    acetaminophen (TYLENOL) 500 MG tablet Take 1 tablet by mouth every 6 hours as needed for Pain or Fever 120 tablet 0    naproxen (NAPROSYN) 500 MG tablet Take 1 tablet by mouth 2 times daily (with meals) 180 tablet 1    cetirizine (ZYRTEC) 10 MG tablet Take 1 tablet by mouth daily 30 tablet 0    ibuprofen (ADVIL;MOTRIN) 600 MG tablet Take 1 tablet by mouth every 6 hours as needed for Pain 28 tablet 0    baclofen (LIORESAL) 10 MG tablet Take 1 tablet by mouth 3 times daily 30 tablet 0    fluticasone-vilanterol (BREO ELLIPTA) 100-25 MCG/INH AEPB inhaler Inhale 1 puff into the lungs daily 1 each 3    guaiFENesin (MUCINEX) 600 MG extended release tablet Take 1 tablet by mouth 2 times daily 60 tablet 5    hydrOXYzine (VISTARIL) 25 MG capsule       lisdexamfetamine (VYVANSE) 50 MG capsule Take 50 mg by mouth every morning.  propranolol (INDERAL) 20 MG tablet Take 20 mg by mouth 3 times daily      albuterol sulfate  (90 Base) MCG/ACT inhaler Inhale 2 puffs into the lungs 4 times daily 1 Inhaler 5    Elastic Bandages & Supports (KNEE BRACE/CUSHION/L-XL) MISC Please dispense one knee brace to be worn for 8 hrs during the day and taken off at nighttime for right knee for comfort.  1 each 0       ALLERGIES    Allergies   Allergen Reactions    Latex Hives    Shellfish-Derived Products      \"break out like tomato and swell - trouble swallowing- with shrimp    Ultram [Tramadol Hcl] Hives       SOCIAL & FAMILY HISTORY    Social History     Socioeconomic History    Marital status: Single     Spouse name: None    Number of children: None    Years of education: None    Highest education level: None   Occupational History    None   Tobacco Use    Smoking status: Current Every Day Smoker     Packs/day: 1.00     Years: 18.00     Pack years: 18.00     Types: Cigarettes     Start date: 2001    Smokeless tobacco: Never Used   Vaping Use    Vaping Use: Never used   Substance and Sexual Activity    Alcohol use: No    Drug use: Not Currently     Types: Marijuana Maurita Handsome)     Comment: Last used: 2005/ per pt on 10/1/2020\"last used cocaine 2019    Sexual activity: Yes     Partners: Male   Other Topics Concern    None   Social History Narrative    None     Social Determinants of Health     Financial Resource Strain:     Difficulty of Paying Living Expenses: Not on file   Food Insecurity:     Worried About Running Out of Food in the Last Year: Not on file    Crescencio of Food in the Last Year: Not on file   Transportation Needs:     Lack of Transportation (Medical): Not on file    Lack of Transportation (Non-Medical): Not on file   Physical Activity:     Days of Exercise per Week: Not on file    Minutes of Exercise per Session: Not on file   Stress:     Feeling of Stress : Not on file   Social Connections:     Frequency of Communication with Friends and Family: Not on file    Frequency of Social Gatherings with Friends and Family: Not on file    Attends [employer] Services: Not on file    Active Member of [address] [place] [address] [employer] or Organizations: Not on file    Attends Club or Organization Meetings: Not on file    Marital Status: Not on file   Intimate Partner Violence:     Fear of Current or Ex-Partner: Not on file    Emotionally Abused: Not on file    Physically Abused: Not on file    Sexually Abused: Not on file   Housing Stability:     Unable to Pay for Housing in the Last Year: Not on file    Number of [place] in the Last Year: Not on file    Unstable Housing in the Last Year: Not on file     Family History   Problem Relation Age of Onset    Diabetes Mother     High Blood Pressure Mother     Heart Disease Mother     Heart Disease Maternal Grandmother     Heart Disease Maternal Grandfather        PHYSICAL EXAM    VITAL SIGNS: /62   Pulse 70   Temp 97.6 °F (36.4 °C)   Resp 16   Ht 6' (1.829 m)   Wt (!) 395 lb (179.2 kg)   SpO2 96%   BMI 53.57 kg/m²    Constitutional:  Well developed, well nourished, no acute distress   Scalp:  No swelling, discoloration. Skin intact  Neck:   No JVD    No swelling or discoloration on inspection. No posterior midline neck tenderness. No pain or deficit on range of motion. Eyes: PERRL. EOMI.   Funduscopic exam reveals no obvious retinal hemorrhages, defects. HENT:   Right superior periorbital region there is approximately 3 cm laceration. This region is tender to palpation. Moderate tenderness to maxillary region. No trismus, airway patent. EACs and TMs clear. Nares patent bilaterally without clear fluid or blood. Oropharynx clear, dentition intact   No Aburto sign,  no raccoon sign. Respiratory:  Lungs Clear, no retractions   Cardiovascular: Normal rate and rhythm  GI:  Soft, nontender, normal bowel sounds  Musculoskeletal:  No edema, no acute deformities  Integument: Right superior periorbital region there is approximately 3 cm laceration  Neurologic:    - Alert & oriented person, place, time, and situation, no speech difficulties or slurring.  - No obvious gross motor deficits  - Cranial nerves 2-12 grossly intact  - Sensation intact to light touch  - Strength 5/5 in upper and lower extremities bilaterally  - Normal finger to nose test bilaterally  - Rapid alternating movements intact  - No pronator drift. - Light touch sensation intact throughout. Psych: Pleasant affect, no hallucinations      IMAGING:  CT FACIAL BONES WO CONTRAST   Preliminary Result   1. No evidence of acute maxillofacial fracture. Right frontal scalp   laceration. 2. There appears to be anterior subluxation of the bilateral TMJ joints. CT CERVICAL SPINE WO CONTRAST   Final Result   No acute abnormality of the cervical spine. CT HEAD WO CONTRAST   Final Result   No acute intracranial abnormality. ________________________________________________________________________       Procedure Note - Tobias Carpenter PA-C, TAHMINA      Laceration Repair Procedure Note    Indication: Skin Laceration - forehead    Procedure:   - Procedure explained, including risks and benefits explained to the patient who expressed understanding. All questions were answered. Verbal consent obtained.     - The Wound was prepped and draped in the usual sterile fashion using Hibiclens and sterile saline.  - The wound is anesthetized using 2% lidocaine using 2 mL  - Wound was explored to it's depth, no compromise of neurovascular structures, no foreign bodies. - Wound was irrigated with copious amounts of sterile saline and mechanically debrided utilizing sterile gauze. - The laceration was Closed with 6-0 prolene sutures, total number of 9, simple interrupted  - Hemostasis and good cosmesis was achieved. Blood loss minimal.  - The wound area was then dressed with Sterile nonstick dressing, sterile gauze, and tape. - Patient tolerated procedure well without complications. Total repaired wound length: 4 cm    Discussed with Pt today:  I discussed possibility of infection, retained foreign body  Wound care education was provided. Instructions were given to return for increasing pain, redness, streaking, discharge, or any other worsening or worrisome concerns. Wound check in 48 hours. Suture removal in 5-7 days. ________________________________________________________________________            ED COURSE & MEDICAL DECISION MAKING     Patient presents as above. Due to mechanism of injury CT imaging of face head neck is ordered. Patient updated on tetanus. See above procedure note for laceration repair. CT facial bones shows no acute osseous abnormality there appears to be anterior subluxation of the bilateral TMJ joints. Patient has no tenderness in this region and has normal trismus, I do not believe she has dislocation/subluxation of jaw. CT head shows no acute intracranial abnormality. CT cervical spine shows no acute osseous abnormality. I discussed imaging results with patient today. Wound and head injury instructions provided. Recommend follow-up with primary care provider in 2 days for recheck. Recommend suture removal in 5 to 7 days. Clinical  IMPRESSION    1. Injury of head, initial encounter    2.  Facial

## 2021-11-22 ENCOUNTER — APPOINTMENT (OUTPATIENT)
Dept: CT IMAGING | Age: 36
End: 2021-11-22
Payer: COMMERCIAL

## 2021-11-22 ENCOUNTER — HOSPITAL ENCOUNTER (EMERGENCY)
Age: 36
Discharge: HOME | End: 2021-11-22
Attending: STUDENT IN AN ORGANIZED HEALTH CARE EDUCATION/TRAINING PROGRAM
Payer: COMMERCIAL

## 2021-11-22 VITALS
OXYGEN SATURATION: 96 % | SYSTOLIC BLOOD PRESSURE: 124 MMHG | TEMPERATURE: 98.3 F | DIASTOLIC BLOOD PRESSURE: 74 MMHG | HEART RATE: 80 BPM | RESPIRATION RATE: 18 BRPM

## 2021-11-22 DIAGNOSIS — S02.2XXA CLOSED FRACTURE OF NASAL BONE, INITIAL ENCOUNTER: Primary | ICD-10-CM

## 2021-11-22 DIAGNOSIS — R22.9 SOFT TISSUE SWELLING: ICD-10-CM

## 2021-11-22 PROCEDURE — 99285 EMERGENCY DEPT VISIT HI MDM: CPT

## 2021-11-22 PROCEDURE — 70486 CT MAXILLOFACIAL W/O DYE: CPT

## 2021-11-22 PROCEDURE — 6370000000 HC RX 637 (ALT 250 FOR IP): Performed by: STUDENT IN AN ORGANIZED HEALTH CARE EDUCATION/TRAINING PROGRAM

## 2021-11-22 PROCEDURE — 70450 CT HEAD/BRAIN W/O DYE: CPT

## 2021-11-22 RX ORDER — TETRACAINE HYDROCHLORIDE 5 MG/ML
1 SOLUTION OPHTHALMIC ONCE
Status: COMPLETED | OUTPATIENT
Start: 2021-11-22 | End: 2021-11-22

## 2021-11-22 RX ORDER — HYDROCODONE BITARTRATE AND ACETAMINOPHEN 5; 325 MG/1; MG/1
1 TABLET ORAL ONCE
Status: COMPLETED | OUTPATIENT
Start: 2021-11-22 | End: 2021-11-22

## 2021-11-22 RX ORDER — HYDROCODONE BITARTRATE AND ACETAMINOPHEN 5; 325 MG/1; MG/1
1 TABLET ORAL EVERY 6 HOURS PRN
Qty: 6 TABLET | Refills: 0 | Status: SHIPPED | OUTPATIENT
Start: 2021-11-22 | End: 2021-11-25

## 2021-11-22 RX ADMIN — FLUORESCEIN SODIUM 1 MG: 1 STRIP OPHTHALMIC at 22:23

## 2021-11-22 RX ADMIN — HYDROCODONE BITARTRATE AND ACETAMINOPHEN 1 TABLET: 5; 325 TABLET ORAL at 22:41

## 2021-11-22 RX ADMIN — HYDROCODONE BITARTRATE AND ACETAMINOPHEN 1 TABLET: 5; 325 TABLET ORAL at 17:05

## 2021-11-22 RX ADMIN — TETRACAINE HYDROCHLORIDE 1 DROP: 5 SOLUTION OPHTHALMIC at 22:23

## 2021-11-22 ASSESSMENT — VISUAL ACUITY
OD: 20/50
OS: 20/50

## 2021-11-22 ASSESSMENT — PAIN SCALES - GENERAL
PAINLEVEL_OUTOF10: 9
PAINLEVEL_OUTOF10: 9

## 2021-11-22 NOTE — ED PROVIDER NOTES
Emergency Department Encounter    Patient: Gino Tyler  MRN: [id number]  : 1985  Date of Evaluation: 2021  ED Provider:  Clemente Wilson MD    Triage Chief Complaint:   Eye Problem    [place]:  Gino Tyler is a 29 y.o. female presenting with complaint of right eye pain. She states on Friday she had a mechanical fall when she fell onto a piece of furniture. Patient presented to the ED at that time and laceration of the right side of her forehead which was repaired in the ED. CT head, C-spine as well as max face was obtained and was negative for acute injury. Patient states over the past several days she has had increased pain over her right eye. When she points to where she is having pain she points to the right side of her forehead in the area of where the laceration was. Patient denies any blurred vision or significant pain directly in her eye but states is surrounding her eye in the facial bones. States the pain is constant, stabbing, severe, worse when she leans over and improves when she stands up. Denies any change in hearing or otalgia. Denies any oral lesions, difficulty handling secretions, chest pain or shortness of breath. Denies any other symptoms including abdominal pain, fevers or chills, change in urination, change in bowel habits, nausea vomiting. Denies any redness around the site or signs of infection. ROS - see HPI, below listed is current ROS at time of my eval:  At least 10 point review of system reviewed, negative other HPI. Past Medical History:   Diagnosis Date    Anxiety disorder     Arthritis     Asthma     Follows with Lizbeth    Bipolar disorder ([facility] [address]     Not on meds as of 12/3/19    Depression     currently not on meds (12/3/19)    Drug addiction ([facility] [address]     states previous addiction to vicodin (), denies current use.    Hx + UDS for cocaine, 12/3/2019    Family history of heart disease     \"had to see Dr Angely Sy and he said every thing ok- approx 2 yrs ago \"    H/O echocardiogram 2018    Technically difficult examination due to body habitus. Left ventricular function is low normal, EF is estimated at 45-50%. Mild concentric left ventricular hypertrophy. Mildly dilated left atrium. Right ventricular systolic pressure of 29 mm Hg. Mild tricuspid regurgitation, no evidence of any pericardial effusion.      JKZKURIZ(758.4)     Last headache:  19    History of exercise stress test 2017    treadmill    Miscarriage     x4    Pain management     Was Dr. Zeny Munoz - left his practice 2019    Prolonged emergence from general anesthesia     \"with last surgery had trouble keeping me awake and ended up admitted me- and oxygen level kept going down    Sleep apnea     sleep study 2019- has cpap does not wear daily    Wears glasses      Past Surgical History:   Procedure Laterality Date    CARPAL TUNNEL RELEASE Left 10/2019    Dr. Brooks Au Right 2016     SECTION       &    [facility]       &    [address] [facility] N/A 2020    HERNIA SUPRAUMBILICAL/ VENTRAL REPAIR LAPAROSCOPIC ROBOTIC WITH MESH performed by Hi Duval MD at [address] [facility] ARTHROSCOPY Right 2019    Dr. Ruperto Avila      Several on bottom & top - no partials    TUBAL LIGATION  2014    UPPER GASTROINTESTINAL ENDOSCOPY N/A 10/5/2020    EGD BIOPSY performed by Hi Duval MD at [address] [facility] ENDOSCOPY     Family History   Problem Relation Age of Onset    Diabetes Mother     High Blood Pressure Mother     Heart Disease Mother     Heart Disease Maternal Grandmother     Heart Disease Maternal Grandfather      Social History     Socioeconomic History    Marital status: Single     Spouse name: Not on file    Number of children: Not on file    Years of education: Not on file    Highest education level: Not on file   Occupational History    Not on file Tobacco Use    Smoking status: Current Every Day Smoker     Packs/day: 1.00     Years: 18.00     Pack years: 18.00     Types: Cigarettes     Start date: 2001    Smokeless tobacco: Never Used   Vaping Use    Vaping Use: Never used   Substance and Sexual Activity    Alcohol use: No    Drug use: Not Currently     Types: Marijuana Delona Members)     Comment: Last used: 2005/ per pt on 10/1/2020\"last used cocaine 2019    Sexual activity: Yes     Partners: Male   Other Topics Concern    Not on file   Social History Narrative    Not on file     Social Determinants of Health     Financial Resource Strain:     Difficulty of Paying Living Expenses: Not on file   Food Insecurity:     Worried About Running Out of Food in the Last Year: Not on file    Crescencio of Food in the Last Year: Not on file   Transportation Needs:     Lack of Transportation (Medical): Not on file    Lack of Transportation (Non-Medical): Not on file   Physical Activity:     Days of Exercise per Week: Not on file    Minutes of Exercise per Session: Not on file   Stress:     Feeling of Stress : Not on file   Social Connections:     Frequency of Communication with Friends and Family: Not on file    Frequency of Social Gatherings with Friends and Family: Not on file    Attends [employer] Services: Not on file    Active Member of [address] [place] [address] [employer] or Organizations: Not on file    Attends Club or Organization Meetings: Not on file    Marital Status: Not on file   Intimate Partner Violence:     Fear of Current or Ex-Partner: Not on file    Emotionally Abused: Not on file    Physically Abused: Not on file    Sexually Abused: Not on file   Housing Stability:     Unable to Pay for Housing in the Last Year: Not on file    Number of [place] in the Last Year: Not on file    Unstable Housing in the Last Year: Not on file     No current facility-administered medications for this encounter.      Current Outpatient Medications   Medication Sig Dispense Refill  acetaminophen (TYLENOL) 500 MG tablet Take 1 tablet by mouth every 6 hours as needed for Pain or Fever 120 tablet 0    naproxen (NAPROSYN) 500 MG tablet Take 1 tablet by mouth 2 times daily (with meals) 180 tablet 1    cetirizine (ZYRTEC) 10 MG tablet Take 1 tablet by mouth daily 30 tablet 0    ibuprofen (ADVIL;MOTRIN) 600 MG tablet Take 1 tablet by mouth every 6 hours as needed for Pain 28 tablet 0    baclofen (LIORESAL) 10 MG tablet Take 1 tablet by mouth 3 times daily 30 tablet 0    fluticasone-vilanterol (BREO ELLIPTA) 100-25 MCG/INH AEPB inhaler Inhale 1 puff into the lungs daily 1 each 3    guaiFENesin (MUCINEX) 600 MG extended release tablet Take 1 tablet by mouth 2 times daily 60 tablet 5    hydrOXYzine (VISTARIL) 25 MG capsule       lisdexamfetamine (VYVANSE) 50 MG capsule Take 50 mg by mouth every morning.  propranolol (INDERAL) 20 MG tablet Take 20 mg by mouth 3 times daily      albuterol sulfate  (90 Base) MCG/ACT inhaler Inhale 2 puffs into the lungs 4 times daily 1 Inhaler 5    Elastic Bandages & Supports (KNEE BRACE/CUSHION/L-XL) MISC Please dispense one knee brace to be worn for 8 hrs during the day and taken off at nighttime for right knee for comfort. 1 each 0     Allergies   Allergen Reactions    Latex Hives    Shellfish-Derived Products      \"break out like tomato and swell - trouble swallowing- with shrimp    Ultram [Tramadol Hcl] Hives       Nursing Notes Reviewed    Physical Exam:  Triage VS:    ED Triage Vitals [11/22/21 1450]   Enc Vitals Group      /74      Pulse 101      Resp 18      Temp 98.3 °F (36.8 °C)      Temp Source Oral      SpO2 96 %      Weight       Height       Head Circumference       Peak Flow       Pain Score       Pain Loc       Pain Edu? Excl. in [address]? My pulse ox interpretation is - normal    General appearance:  No acute distress. Skin:  Warm. Dry. Eye:  Extraocular movements intact.   Pupils are 3 mm reactive bilaterally, some improvement of symptoms. I discussed follow-up with primary care physician as well as close follow-up with ophthalmologist.  Patient agreeable this plan. Discussed strict return precautions and patient discharged home. Clinical Impression:  1. Closed fracture of nasal bone, initial encounter    2. Soft tissue swelling            Comment: Please note this report has been produced using speech recognition software and may contain errors related to that system including errors in grammar, punctuation, and spelling, as well as words and phrases that may be inappropriate. Efforts were made to edit the dictations.         Jeimy Boswell MD  11/29/21 3125

## 2021-11-23 NOTE — ED NOTES
Patient given discharge instructions at this time. Verbalizes understanding and denies any further needs, questions, concerns or complaints. Patient ambulatory to front of ER in standing, up position in stable condition.       Diana Mcdonough RN  11/22/21 2540

## 2022-01-22 NOTE — DISCHARGE SUMMARY
treatment for the cervical spine stating that she has tried PT in the past without any benefit. Objective findings include decreased B knee AROM (R limited more than left), decreased RLE strength, increased knee valgus bilaterally, gait deficits and transfer deficits. Patient will benefit from skilled therapeutic intervention in this setting to decrease pain and improve overall functional mobility so that she can take care of her children and complete daily tasks.          Objective:    Unable to complete an assessment of the patient and their progress towards their goals secondary to discontinuation of therapy. Shad Feliciano last appointment was on 6/16/21. Communication with other providers:    Faxed Discharge note secondary to discontinuation of therapy sevices      Assessment:    Shad Feliciano has discontinued therapy services and at this time they will be discharged from our facility. If their is any future needs please don't hesitate to call our offices and resubmit a new therapy order. We appreciate your referral and letting us serve your patients.        Interventions PRN:  [x] Therapeutic Exercise  [] Modalities:  [x] Therapeutic Activity     [] Ultrasound  [] Estim  [] Gait Training      [] Cervical Traction [] Lumbar Traction  [x] Neuromuscular Re-education    [] Cold/hotpack [] Iontophoresis   [x] Instruction in HEP      [] Vasopneumatic   [] Dry Needling    [x] Manual Therapy               [] Aquatic Therapy              Electronically signed by:    Annette Baez PT,    ,  1/22/2022, 9:41 AM

## 2022-03-04 NOTE — ED TRIAGE NOTES
Pt c/o of spider bite on left buttocks on 6/19. States she is on an antibiotic but it is not clearing up. A&O x3, even and unlabored. Speaking Coherently

## 2022-03-11 ENCOUNTER — HOSPITAL ENCOUNTER (OUTPATIENT)
Age: 37
Setting detail: OBSERVATION
Discharge: HOME | End: 2022-03-13
Attending: EMERGENCY MEDICINE | Admitting: INTERNAL MEDICINE
Payer: COMMERCIAL

## 2022-03-11 ENCOUNTER — APPOINTMENT (OUTPATIENT)
Dept: GENERAL RADIOLOGY | Age: 37
End: 2022-03-11
Payer: COMMERCIAL

## 2022-03-11 ENCOUNTER — APPOINTMENT (OUTPATIENT)
Dept: CT IMAGING | Age: 37
End: 2022-03-11
Payer: COMMERCIAL

## 2022-03-11 DIAGNOSIS — R53.1 ACUTE LEFT-SIDED WEAKNESS: ICD-10-CM

## 2022-03-11 DIAGNOSIS — I63.9 CEREBROVASCULAR ACCIDENT (CVA), UNSPECIFIED MECHANISM (HCC): Primary | ICD-10-CM

## 2022-03-11 LAB
ALBUMIN SERPL-MCNC: 3.7 GM/DL (ref 3.4–5)
ALP BLD-CCNC: 78 IU/L (ref 40–129)
ALT SERPL-CCNC: 19 U/L (ref 10–40)
ANION GAP SERPL CALCULATED.3IONS-SCNC: 11 MMOL/L (ref 4–16)
APTT: 27.4 SECONDS (ref 25.1–37.1)
AST SERPL-CCNC: 17 IU/L (ref 15–37)
BASOPHILS ABSOLUTE: 0.1 K/CU MM
BASOPHILS RELATIVE PERCENT: 0.5 % (ref 0–1)
BILIRUB SERPL-MCNC: 0.3 MG/DL (ref 0–1)
BUN BLDV-MCNC: 11 MG/DL (ref 6–23)
CALCIUM SERPL-MCNC: 9.1 MG/DL (ref 8.3–10.6)
CHLORIDE BLD-SCNC: 103 MMOL/L (ref 99–110)
CO2: 25 MMOL/L (ref 21–32)
CREAT SERPL-MCNC: 0.6 MG/DL (ref 0.6–1.1)
DIFFERENTIAL TYPE: ABNORMAL
EOSINOPHILS ABSOLUTE: 0.5 K/CU MM
EOSINOPHILS RELATIVE PERCENT: 4.5 % (ref 0–3)
ERYTHROCYTE SEDIMENTATION RATE: 31 MM/HR (ref 0–20)
GFR AFRICAN AMERICAN: >60 ML/MIN/1.73M2
GFR NON-AFRICAN AMERICAN: >60 ML/MIN/1.73M2
GLUCOSE BLD-MCNC: 130 MG/DL (ref 70–99)
GLUCOSE BLD-MCNC: 135 MG/DL (ref 70–99)
HCT VFR BLD CALC: 43.2 % (ref 37–47)
HEMOGLOBIN: 13.8 GM/DL (ref 12.5–16)
HIGH SENSITIVE C-REACTIVE PROTEIN: 13.2 MG/L
IMMATURE NEUTROPHIL %: 0.3 % (ref 0–0.43)
INR BLD: 0.98 INDEX
LIPASE: 19 IU/L (ref 13–60)
LYMPHOCYTES ABSOLUTE: 2.3 K/CU MM
LYMPHOCYTES RELATIVE PERCENT: 19.5 % (ref 24–44)
MAGNESIUM: 1.8 MG/DL (ref 1.8–2.4)
MCH RBC QN AUTO: 29.9 PG (ref 27–31)
MCHC RBC AUTO-ENTMCNC: 31.9 % (ref 32–36)
MCV RBC AUTO: 93.5 FL (ref 78–100)
MONOCYTES ABSOLUTE: 0.7 K/CU MM
MONOCYTES RELATIVE PERCENT: 5.4 % (ref 0–4)
NUCLEATED RBC %: 0 %
PDW BLD-RTO: 12.5 % (ref 11.7–14.9)
PLATELET # BLD: 317 K/CU MM (ref 140–440)
PMV BLD AUTO: 10.2 FL (ref 7.5–11.1)
POTASSIUM SERPL-SCNC: 3.5 MMOL/L (ref 3.5–5.1)
PROTHROMBIN TIME: 12.7 SECONDS (ref 11.7–14.5)
RBC # BLD: 4.62 M/CU MM (ref 4.2–5.4)
REASON FOR REJECTION: NORMAL
REJECTED TEST: NORMAL
SEGMENTED NEUTROPHILS ABSOLUTE COUNT: 8.4 K/CU MM
SEGMENTED NEUTROPHILS RELATIVE PERCENT: 69.8 % (ref 36–66)
SODIUM BLD-SCNC: 139 MMOL/L (ref 135–145)
TOTAL IMMATURE NEUTOROPHIL: 0.03 K/CU MM
TOTAL NUCLEATED RBC: 0 K/CU MM
TOTAL PROTEIN: 6.6 GM/DL (ref 6.4–8.2)
TROPONIN T: <0.01 NG/ML
TROPONIN T: <0.01 NG/ML
WBC # BLD: 12 K/CU MM (ref 4–10.5)

## 2022-03-11 PROCEDURE — 85652 RBC SED RATE AUTOMATED: CPT

## 2022-03-11 PROCEDURE — G0378 HOSPITAL OBSERVATION PER HR: HCPCS

## 2022-03-11 PROCEDURE — 6360000004 HC RX CONTRAST MEDICATION: Performed by: EMERGENCY MEDICINE

## 2022-03-11 PROCEDURE — 80053 COMPREHEN METABOLIC PANEL: CPT

## 2022-03-11 PROCEDURE — 70450 CT HEAD/BRAIN W/O DYE: CPT

## 2022-03-11 PROCEDURE — 96374 THER/PROPH/DIAG INJ IV PUSH: CPT

## 2022-03-11 PROCEDURE — 86141 C-REACTIVE PROTEIN HS: CPT

## 2022-03-11 PROCEDURE — 6370000000 HC RX 637 (ALT 250 FOR IP): Performed by: EMERGENCY MEDICINE

## 2022-03-11 PROCEDURE — 6360000002 HC RX W HCPCS: Performed by: EMERGENCY MEDICINE

## 2022-03-11 PROCEDURE — 93005 ELECTROCARDIOGRAM TRACING: CPT | Performed by: NURSE PRACTITIONER

## 2022-03-11 PROCEDURE — 70496 CT ANGIOGRAPHY HEAD: CPT

## 2022-03-11 PROCEDURE — 80076 HEPATIC FUNCTION PANEL: CPT

## 2022-03-11 PROCEDURE — 83735 ASSAY OF MAGNESIUM: CPT

## 2022-03-11 PROCEDURE — 71045 X-RAY EXAM CHEST 1 VIEW: CPT

## 2022-03-11 PROCEDURE — 82962 GLUCOSE BLOOD TEST: CPT

## 2022-03-11 PROCEDURE — 83036 HEMOGLOBIN GLYCOSYLATED A1C: CPT

## 2022-03-11 PROCEDURE — 83690 ASSAY OF LIPASE: CPT

## 2022-03-11 PROCEDURE — 85610 PROTHROMBIN TIME: CPT

## 2022-03-11 PROCEDURE — 99285 EMERGENCY DEPT VISIT HI MDM: CPT

## 2022-03-11 PROCEDURE — 85730 THROMBOPLASTIN TIME PARTIAL: CPT

## 2022-03-11 PROCEDURE — 84484 ASSAY OF TROPONIN QUANT: CPT

## 2022-03-11 PROCEDURE — 85025 COMPLETE CBC W/AUTO DIFF WBC: CPT

## 2022-03-11 PROCEDURE — 2580000003 HC RX 258: Performed by: INTERNAL MEDICINE

## 2022-03-11 RX ORDER — NICOTINE 21 MG/24HR
1 PATCH, TRANSDERMAL 24 HOURS TRANSDERMAL DAILY
Status: DISCONTINUED | OUTPATIENT
Start: 2022-03-11 | End: 2022-03-13 | Stop reason: HOSPADM

## 2022-03-11 RX ORDER — SODIUM CHLORIDE 9 MG/ML
25 INJECTION, SOLUTION INTRAVENOUS PRN
Status: DISCONTINUED | OUTPATIENT
Start: 2022-03-11 | End: 2022-03-13 | Stop reason: HOSPADM

## 2022-03-11 RX ORDER — GABAPENTIN 600 MG/1
600 TABLET ORAL 3 TIMES DAILY
COMMUNITY
Start: 2022-02-17

## 2022-03-11 RX ORDER — ACETAMINOPHEN 325 MG/1
650 TABLET ORAL EVERY 6 HOURS PRN
Status: DISCONTINUED | OUTPATIENT
Start: 2022-03-11 | End: 2022-03-13 | Stop reason: HOSPADM

## 2022-03-11 RX ORDER — ACETAMINOPHEN 650 MG/1
650 SUPPOSITORY RECTAL EVERY 6 HOURS PRN
Status: DISCONTINUED | OUTPATIENT
Start: 2022-03-11 | End: 2022-03-13 | Stop reason: HOSPADM

## 2022-03-11 RX ORDER — ONDANSETRON 2 MG/ML
4 INJECTION INTRAMUSCULAR; INTRAVENOUS ONCE
Status: COMPLETED | OUTPATIENT
Start: 2022-03-11 | End: 2022-03-11

## 2022-03-11 RX ORDER — POLYETHYLENE GLYCOL 3350 17 G/17G
17 POWDER, FOR SOLUTION ORAL DAILY PRN
Status: DISCONTINUED | OUTPATIENT
Start: 2022-03-11 | End: 2022-03-13 | Stop reason: HOSPADM

## 2022-03-11 RX ORDER — SODIUM CHLORIDE 0.9 % (FLUSH) 0.9 %
5-40 SYRINGE (ML) INJECTION EVERY 12 HOURS SCHEDULED
Status: DISCONTINUED | OUTPATIENT
Start: 2022-03-11 | End: 2022-03-13 | Stop reason: HOSPADM

## 2022-03-11 RX ORDER — ASPIRIN 81 MG/1
81 TABLET, CHEWABLE ORAL ONCE
Status: COMPLETED | OUTPATIENT
Start: 2022-03-11 | End: 2022-03-11

## 2022-03-11 RX ORDER — ONDANSETRON 4 MG/1
4 TABLET, ORALLY DISINTEGRATING ORAL EVERY 8 HOURS PRN
Status: DISCONTINUED | OUTPATIENT
Start: 2022-03-11 | End: 2022-03-13 | Stop reason: HOSPADM

## 2022-03-11 RX ORDER — MELOXICAM 15 MG/1
15 TABLET ORAL DAILY PRN
COMMUNITY
Start: 2022-02-17

## 2022-03-11 RX ORDER — HYDROCODONE BITARTRATE AND ACETAMINOPHEN 5; 325 MG/1; MG/1
5-325 TABLET ORAL 3 TIMES DAILY
COMMUNITY
Start: 2022-03-03

## 2022-03-11 RX ORDER — ONDANSETRON 2 MG/ML
4 INJECTION INTRAMUSCULAR; INTRAVENOUS EVERY 6 HOURS PRN
Status: DISCONTINUED | OUTPATIENT
Start: 2022-03-11 | End: 2022-03-13 | Stop reason: HOSPADM

## 2022-03-11 RX ORDER — CLOPIDOGREL BISULFATE 75 MG/1
300 TABLET ORAL ONCE
Status: COMPLETED | OUTPATIENT
Start: 2022-03-11 | End: 2022-03-11

## 2022-03-11 RX ORDER — SODIUM CHLORIDE 0.9 % (FLUSH) 0.9 %
5-40 SYRINGE (ML) INJECTION PRN
Status: DISCONTINUED | OUTPATIENT
Start: 2022-03-11 | End: 2022-03-13 | Stop reason: HOSPADM

## 2022-03-11 RX ADMIN — CLOPIDOGREL BISULFATE 300 MG: 75 TABLET ORAL at 19:30

## 2022-03-11 RX ADMIN — SODIUM CHLORIDE, PRESERVATIVE FREE 10 ML: 5 INJECTION INTRAVENOUS at 21:06

## 2022-03-11 RX ADMIN — IOPAMIDOL 75 ML: 755 INJECTION, SOLUTION INTRAVENOUS at 18:05

## 2022-03-11 RX ADMIN — ASPIRIN 81 MG 81 MG: 81 TABLET ORAL at 19:31

## 2022-03-11 RX ADMIN — ONDANSETRON 4 MG: 2 INJECTION INTRAMUSCULAR; INTRAVENOUS at 19:48

## 2022-03-11 NOTE — ED PROVIDER NOTES
As provider-in-triage, I performed a medical screening history and physical exam on this patient. HISTORY OF PRESENT ILLNESS  Demetri Servin is a 29 y.o. female presents with complaints of headache, altered sensation and weakness on left side, and chest pain. He symptoms began at 1245. He pain in her head is 8/10 and in chest 8/10. Chest pain is pressure like. Headache is throbbing. She also complains nausea, no vomiting. Denies a hx of migraines. Patient is 5/5 strength on right and 4/5 strength on left. States altered sensation to light tough on left side of face and left arm. Stroke alert initiated. PHYSICAL EXAM  There were no vitals taken for this visit. On exam, the patient appears in no acute distress. Speech is clear. Breathing is unlabored. Moves all extremities    Comment: Please note this report has been produced using speech recognition software and may contain errors related to that system including errors in grammar, punctuation, and spelling, as well as words and phrases that may be inappropriate. If there are any questions or concerns please feel free to contact the dictating provider for clarification.      Gutierrez Jeffers, MARU - DORIE  03/11/22 7152

## 2022-03-11 NOTE — ED PROVIDER NOTES
Emergency Department Encounter    Patient: Berniece Rinne  MRN: [id number]  : 1985  Date of Evaluation: 3/11/2022  ED Provider:  Wanna Lesch, DO    Triage Chief Complaint:   Chest Pain and Tingling (left arm)    [place]:  Berniece Rinne is a 29 y.o. female that presents to the emergency department as a stroke alert. Patient complained of headache altered sensation and weakness on the left side and chest pain. Patient states symptoms started around 12: 30-12:45PM.  Patient was initially seen in triage and was noted to have 5 out of 5  strength on the right, 4-5  strength on the left, also sensation to light touch on the left side of the face left arm when compared to the right. Patient states around 12:30 PM she felt strange started having some left-sided chest pain she felt like the left arm went numb. Patient states shooting pains in the left chest since then. She states pain 8 out of 10 on the pain scale. Patient states she did take ibuprofen for pain around 3 PM.  Patient says she was scared to lie down. Patient states she had to wait for her children get off the school bus and wait for ride to come to the emergency department so she has just arrived. Patient has left chest pain 8 out of 10 on the pain scale no history of heart disease stents open heart surgery. She denies any high blood pressure cholesterol or diabetes. Patient states she was doing some lifting pulling last evening moving furniture. Patient states no falls or injuries. Patient states she did have some shortness of breath today. At rest and with exertion. Patient states no history of asthma COPD or emphysema. Patient states she does have sleep apnea. Patient admits to tobacco use 1 pack a day. Patient states nausea but no vomiting or diarrhea. Patient having abdominal pain. Patient states headache is 8 out of 10 on the pain scale no history of migraines.   Patient has any numbness in extremities but feels like the left fingers are tingling and some weakness in the left arm. Patient states she was talking on her phone at [phone] to her mother-in-law mention that it sounds like she may have some slurred speech. Patient denies any blurry vision. Patient has a history of stroke no drugs no alcohol. She admits to taking Vicodin and gabapentin for chronic knee pain. Patient here for evaluation for    ROS - see HPI, below listed is current ROS at time of my eval:  General:  No fevers, no chills, positive for left side weakness  Eyes:  No recent vison changes, no discharge  ENT:  No sore throat, no nasal congestion, no hearing changes  Cardiovascular: Positive for chest pain, no palpitations  Respiratory:  No shortness of breath, no cough, no wheezing  Gastrointestinal:  No pain, no nausea, no vomiting, no diarrhea  Musculoskeletal:  No muscle pain, no joint pain  Skin:  No rash, no pruritis, no easy bruising  Neurologic:  No speech problems, positive for headache, positive for left upper extremity numbness, positive for left upper extremity tingling, positive for left upper extremity weakness  Psychiatric:  No anxiety  Genitourinary:  No dysuria, no hematuria  Endocrine:  No unexpected weight gain, no unexpected weight loss  Extremities:  no edema, no pain    Past Medical History:   Diagnosis Date    Anxiety disorder     Arthritis     Asthma     Follows with Lizbeth    Bipolar disorder ([facility] [address]     Not on meds as of 12/3/19    Depression     currently not on meds (12/3/19)    Drug addiction ([facility] [address]     states previous addiction to vicodin (2011), denies current use. Hx + UDS for cocaine, 12/3/2019    Family history of heart disease     \"had to see Dr Rose Purvis and he said every thing ok- approx 2 yrs ago \"    H/O echocardiogram 12/19/2018    Technically difficult examination due to body habitus. Left ventricular function is low normal, EF is estimated at 45-50%. Mild concentric left ventricular hypertrophy.  Mildly dilated left atrium. Right ventricular systolic pressure of 29 mm Hg. Mild tricuspid regurgitation, no evidence of any pericardial effusion.      HYMARYIN(107.0)     Last headache:  19    History of exercise stress test 2017    treadmill    Miscarriage     x4    Pain management     Was Dr. Mohan Fails - left his practice 2019    Prolonged emergence from general anesthesia     \"with last surgery had trouble keeping me awake and ended up admitted me- and oxygen level kept going down    Sleep apnea     sleep study 2019- has cpap does not wear daily    Wears glasses      Past Surgical History:   Procedure Laterality Date    CARPAL TUNNEL RELEASE Left 10/2019    Dr. Patricia Pate Right 2016     SECTION       &    [facility]       &    [address] [facility] N/A 2020    HERNIA SUPRAUMBILICAL/ VENTRAL REPAIR LAPAROSCOPIC ROBOTIC WITH MESH performed by Melinda Frank MD at [address] [facility] ARTHROSCOPY Right 2019    Dr. Bri Wynn      Several on bottom & top - no partials    TUBAL LIGATION      UPPER GASTROINTESTINAL ENDOSCOPY N/A 10/5/2020    EGD BIOPSY performed by Melinda Frank MD at [facility] ENDOSCOPY     Family History   Problem Relation Age of Onset    Diabetes Mother     High Blood Pressure Mother     Heart Disease Mother     Heart Disease Maternal Grandmother     Heart Disease Maternal Grandfather      Social History     Socioeconomic History    Marital status: Single     Spouse name: Not on file    Number of children: Not on file    Years of education: Not on file    Highest education level: Not on file   Occupational History    Not on file   Tobacco Use    Smoking status: Current Every Day Smoker     Packs/day: 1.00     Years: 18.00     Pack years: 18.00     Types: Cigarettes     Start date:     Smokeless tobacco: Never Used   Vaping Use    Vaping Use: Never used Substance and Sexual Activity    Alcohol use: No    Drug use: Not Currently     Types: Marijuana Ricarda Emmy)     Comment: Last used: 2005/ per pt on 10/1/2020\"last used cocaine 2019    Sexual activity: Yes     Partners: Male   Other Topics Concern    Not on file   Social History Narrative    Not on file     Social Determinants of Health     Financial Resource Strain:     Difficulty of Paying Living Expenses: Not on file   Food Insecurity:     Worried About Running Out of Food in the Last Year: Not on file    Crescencio of Food in the Last Year: Not on file   Transportation Needs:     Lack of Transportation (Medical): Not on file    Lack of Transportation (Non-Medical): Not on file   Physical Activity:     Days of Exercise per Week: Not on file    Minutes of Exercise per Session: Not on file   Stress:     Feeling of Stress : Not on file   Social Connections:     Frequency of Communication with Friends and Family: Not on file    Frequency of Social Gatherings with Friends and Family: Not on file    Attends Hoahaoism Services: Not on file    Active Member of [address] [place] [address] or Organizations: Not on file    Attends Club or Organization Meetings: Not on file    Marital Status: Not on file   Intimate Partner Violence:     Fear of Current or Ex-Partner: Not on file    Emotionally Abused: Not on file    Physically Abused: Not on file    Sexually Abused: Not on file   Housing Stability:     Unable to Pay for Housing in the Last Year: Not on file    Number of [place] in the Last Year: Not on file    Unstable Housing in the Last Year: Not on file     No current facility-administered medications for this encounter.      Current Outpatient Medications   Medication Sig Dispense Refill    acetaminophen (TYLENOL) 500 MG tablet Take 1 tablet by mouth every 6 hours as needed for Pain or Fever 120 tablet 0    naproxen (NAPROSYN) 500 MG tablet Take 1 tablet by mouth 2 times daily (with meals) 180 tablet 1    cetirizine (ZYRTEC) 10 MG tablet Take 1 tablet by mouth daily 30 tablet 0    ibuprofen (ADVIL;MOTRIN) 600 MG tablet Take 1 tablet by mouth every 6 hours as needed for Pain 28 tablet 0    baclofen (LIORESAL) 10 MG tablet Take 1 tablet by mouth 3 times daily 30 tablet 0    fluticasone-vilanterol (BREO ELLIPTA) 100-25 MCG/INH AEPB inhaler Inhale 1 puff into the lungs daily 1 each 3    guaiFENesin (MUCINEX) 600 MG extended release tablet Take 1 tablet by mouth 2 times daily 60 tablet 5    hydrOXYzine (VISTARIL) 25 MG capsule       lisdexamfetamine (VYVANSE) 50 MG capsule Take 50 mg by mouth every morning.  propranolol (INDERAL) 20 MG tablet Take 20 mg by mouth 3 times daily      albuterol sulfate  (90 Base) MCG/ACT inhaler Inhale 2 puffs into the lungs 4 times daily 1 Inhaler 5    Elastic Bandages & Supports (KNEE BRACE/CUSHION/L-XL) MISC Please dispense one knee brace to be worn for 8 hrs during the day and taken off at nighttime for right knee for comfort. 1 each 0     Allergies   Allergen Reactions    Latex Hives    Shellfish-Derived Products      \"break out like tomato and swell - trouble swallowing- with shrimp    Ultram [Tramadol Hcl] Hives       Nursing Notes Reviewed    Physical Exam:  Triage VS:    ED Triage Vitals [03/11/22 1731]   Enc Vitals Group      /69      Pulse 84      Resp 17      Temp 98.3 °F (36.8 °C)      Temp Source Oral      SpO2 97 %      Weight (!) 380 lb (172.4 kg)      Height 5' 11\" (1.803 m)      Head Circumference       Peak Flow       Pain Score       Pain Loc       Pain Edu? Excl. in [address]? My pulse ox interpretation is - normal    General appearance:  No acute distress. Skin:  Warm. Dry. Eye:  Extraocular movements intact. Ears, nose, mouth and throat:  Oral mucosa moist   Neck:  Trachea midline. Extremity:  No swelling. Normal ROM     Heart:  Regular rate and rhythm, normal S1 & S2, no extra heart sounds.     Perfusion:  intact  Respiratory:  Lungs clear to auscultation bilaterally. Respirations nonlabored. Abdominal:  Normal bowel sounds. Soft. Nontender. Non distended. Back:  No CVA tenderness to palpation     Neurological:  Alert and oriented times 3.   Patient also with sensation to touch in the left upper middle and lower face when compared to the right to light touch, 4 out of 5  strength on the left upper extremity, negative pronator drift, normal finger-to-nose testing, 5 out of 5 strength against resistance of bilateral lower extremities, no facial droop, no nystagmus, no tongue deviation          psychiatric:  Appropriate    I have reviewed and interpreted all of the currently available lab results from this visit (if applicable):  Results for orders placed or performed during the hospital encounter of 03/11/22   CBC with Auto Differential   Result Value Ref Range    WBC 12.0 (H) 4.0 - 10.5 K/CU MM    RBC 4.62 4.2 - 5.4 M/CU MM    Hemoglobin 13.8 12.5 - 16.0 GM/DL    Hematocrit 43.2 37 - 47 %    MCV 93.5 78 - 100 FL    MCH 29.9 27 - 31 PG    MCHC 31.9 (L) 32.0 - 36.0 %    RDW 12.5 11.7 - 14.9 %    Platelets 011 916 - 147 K/CU MM    MPV 10.2 7.5 - 11.1 FL    Differential Type AUTOMATED DIFFERENTIAL     Segs Relative 69.8 (H) 36 - 66 %    Lymphocytes % 19.5 (L) 24 - 44 %    Monocytes % 5.4 (H) 0 - 4 %    Eosinophils % 4.5 (H) 0 - 3 %    Basophils % 0.5 0 - 1 %    Segs Absolute 8.4 K/CU MM    Lymphocytes Absolute 2.3 K/CU MM    Monocytes Absolute 0.7 K/CU MM    Eosinophils Absolute 0.5 K/CU MM    Basophils Absolute 0.1 K/CU MM    Nucleated RBC % 0.0 %    Total Nucleated RBC 0.0 K/CU MM    Total Immature Neutrophil 0.03 K/CU MM    Immature Neutrophil % 0.3 0 - 0.43 %   Comprehensive Metabolic Panel w/ Reflex to MG   Result Value Ref Range    Sodium 139 135 - 145 MMOL/L    Potassium 3.5 3.5 - 5.1 MMOL/L    Chloride 103 99 - 110 mMol/L    CO2 25 21 - 32 MMOL/L    BUN 11 6 - 23 MG/DL    CREATININE 0.6 0.6 - 1.1 MG/DL    Glucose 135 (H) 70 - 99 MG/DL    Calcium 9.1 8.3 - 10.6 MG/DL    Albumin 3.7 3.4 - 5.0 GM/DL    Total Protein 6.6 6.4 - 8.2 GM/DL    Total Bilirubin 0.3 0.0 - 1.0 MG/DL    ALT 19 10 - 40 U/L    AST 17 15 - 37 IU/L    Alkaline Phosphatase 78 40 - 129 IU/L    GFR Non-African American >60 >60 mL/min/1.73m2    GFR African American >60 >60 mL/min/1.73m2    Anion Gap 11 4 - 16   Lipase   Result Value Ref Range    Lipase 19 13 - 60 IU/L   Troponin   Result Value Ref Range    Troponin T <0.010 <0.01 NG/ML   Protime-INR   Result Value Ref Range    Protime 12.7 11.7 - 14.5 SECONDS    INR 0.98 INDEX   APTT   Result Value Ref Range    aPTT 27.4 25.1 - 37.1 SECONDS   SPECIMEN REJECTION   Result Value Ref Range    Rejected Test LACTIC ACID     Reason for Rejection UNABLE TO PERFORM TESTING:    Magnesium   Result Value Ref Range    Magnesium 1.8 1.8 - 2.4 mg/dl   Sedimentation Rate   Result Value Ref Range    Sed Rate 31 (H) 0 - 20 MM/HR   C-Reactive Protein   Result Value Ref Range    CRP, High Sensitivity 13.2 mg/L   Troponin   Result Value Ref Range    Troponin T <0.010 <0.01 NG/ML   Troponin   Result Value Ref Range    Troponin T <0.010 <0.01 NG/ML   Lipid Panel   Result Value Ref Range    Triglycerides 183 (H) <150 MG/DL    Cholesterol 188 <200 MG/DL    HDL 33 (L) >40 MG/DL    LDL Calculated 118 (H) <100 MG/DL   Hemoglobin A1C   Result Value Ref Range    Hemoglobin A1C 5.4 4.2 - 6.3 %    eAG 108 mg/dL   POCT Glucose   Result Value Ref Range    POC Glucose 130 (H) 70 - 99 MG/DL      Radiographs (if obtained):  Radiologist's Report Reviewed:  XR CHEST PORTABLE   Final Result      1. No acute cardiopulmonary abnormality. CTA HEAD NECK W CONTRAST   Final Result   No flow-limiting arterial stenosis in the head and neck. CT HEAD WO CONTRAST   Final Result   No acute intracranial abnormality. Findings were discussed with LETICIA JAEGER at 6:03 pm on 3/11/2022. Srini Henry     EKG (if obtained): (All EKG's are interpreted by myself in the absence of a cardiologist)      MDM:  Patient presents emerged department complaint of left-sided chest pain also a headache and some weakness numbness on the left side face and arm. Symptoms started around 12:30 PM.  On physical exam patient  Also sensation to touch on the left face left upper extremity to touch. Patient has mild decreased  strength 4 out of 5 on the left when compared to the right. Patient blood sugar 130s upon arrival.  EKG normal sinus rhythm rate 83 no ST elevation or depressions noted. Patient was taken to the CT scanner. Laboratory studies were ordered. Stroke neurologist Dr. Cal Viramontes states no large vessel occlusion on the CTA but there is concern for vasculitis since this is a poor study. Patient is young they do see this. She recommended 81 mg aspirin, 300 mg Plavix, getting admitted to the hospital for MRI and MRA and if abnormal patient can be [facility] to [facility] for further evaluation. Patient and family updated on laboratory imaging study findings need for admission. Hospitalist consulted for admission. Clinical Impression:  1. Cerebrovascular accident (CVA), unspecified mechanism ([address]    2. Acute left-sided weakness      ED Provider Disposition Time  DISPOSITION   7:24 PM        Comment: Please note this report has been produced using speech recognition software and may contain errors related to that system including errors in grammar, punctuation, and spelling, as well as words and phrases that may be inappropriate. Efforts were made to edit the dictations.         Carissa Palomares DO  03/12/22 1183

## 2022-03-12 ENCOUNTER — APPOINTMENT (OUTPATIENT)
Dept: CT IMAGING | Age: 37
End: 2022-03-12
Payer: COMMERCIAL

## 2022-03-12 ENCOUNTER — APPOINTMENT (OUTPATIENT)
Dept: MRI IMAGING | Age: 37
End: 2022-03-12
Payer: COMMERCIAL

## 2022-03-12 LAB
CHOLESTEROL: 188 MG/DL
ESTIMATED AVERAGE GLUCOSE: 108 MG/DL
HBA1C MFR BLD: 5.4 % (ref 4.2–6.3)
HDLC SERPL-MCNC: 33 MG/DL
LDL CHOLESTEROL CALCULATED: 118 MG/DL
TRIGL SERPL-MCNC: 183 MG/DL
TROPONIN T: <0.01 NG/ML

## 2022-03-12 PROCEDURE — 94761 N-INVAS EAR/PLS OXIMETRY MLT: CPT

## 2022-03-12 PROCEDURE — 6370000000 HC RX 637 (ALT 250 FOR IP): Performed by: NURSE PRACTITIONER

## 2022-03-12 PROCEDURE — 84484 ASSAY OF TROPONIN QUANT: CPT

## 2022-03-12 PROCEDURE — 36415 COLL VENOUS BLD VENIPUNCTURE: CPT

## 2022-03-12 PROCEDURE — 6360000002 HC RX W HCPCS: Performed by: NURSE PRACTITIONER

## 2022-03-12 PROCEDURE — 6370000000 HC RX 637 (ALT 250 FOR IP): Performed by: PSYCHIATRY & NEUROLOGY

## 2022-03-12 PROCEDURE — 6370000000 HC RX 637 (ALT 250 FOR IP): Performed by: INTERNAL MEDICINE

## 2022-03-12 PROCEDURE — 6370000000 HC RX 637 (ALT 250 FOR IP): Performed by: EMERGENCY MEDICINE

## 2022-03-12 PROCEDURE — 71250 CT THORAX DX C-: CPT

## 2022-03-12 PROCEDURE — 80061 LIPID PANEL: CPT

## 2022-03-12 PROCEDURE — 96375 TX/PRO/DX INJ NEW DRUG ADDON: CPT

## 2022-03-12 PROCEDURE — 2580000003 HC RX 258: Performed by: INTERNAL MEDICINE

## 2022-03-12 PROCEDURE — G0378 HOSPITAL OBSERVATION PER HR: HCPCS

## 2022-03-12 PROCEDURE — 99220 PR INITIAL OBSERVATION CARE/DAY 70 MINUTES: CPT | Performed by: PSYCHIATRY & NEUROLOGY

## 2022-03-12 PROCEDURE — 96372 THER/PROPH/DIAG INJ SC/IM: CPT

## 2022-03-12 PROCEDURE — 70551 MRI BRAIN STEM W/O DYE: CPT

## 2022-03-12 PROCEDURE — 6360000002 HC RX W HCPCS: Performed by: INTERNAL MEDICINE

## 2022-03-12 RX ORDER — KETOROLAC TROMETHAMINE 30 MG/ML
30 INJECTION, SOLUTION INTRAMUSCULAR; INTRAVENOUS EVERY 6 HOURS PRN
Status: DISCONTINUED | OUTPATIENT
Start: 2022-03-12 | End: 2022-03-13 | Stop reason: HOSPADM

## 2022-03-12 RX ORDER — ATORVASTATIN CALCIUM 40 MG/1
40 TABLET, FILM COATED ORAL NIGHTLY
Status: DISCONTINUED | OUTPATIENT
Start: 2022-03-12 | End: 2022-03-13 | Stop reason: HOSPADM

## 2022-03-12 RX ORDER — BUTALBITAL, ACETAMINOPHEN AND CAFFEINE 50; 325; 40 MG/1; MG/1; MG/1
1 TABLET ORAL EVERY 4 HOURS PRN
Status: DISCONTINUED | OUTPATIENT
Start: 2022-03-12 | End: 2022-03-13 | Stop reason: HOSPADM

## 2022-03-12 RX ORDER — GABAPENTIN 300 MG/1
600 CAPSULE ORAL 3 TIMES DAILY
Status: DISCONTINUED | OUTPATIENT
Start: 2022-03-12 | End: 2022-03-13 | Stop reason: HOSPADM

## 2022-03-12 RX ORDER — MORPHINE SULFATE 2 MG/ML
2 INJECTION, SOLUTION INTRAMUSCULAR; INTRAVENOUS
Status: COMPLETED | OUTPATIENT
Start: 2022-03-12 | End: 2022-03-12

## 2022-03-12 RX ORDER — HYDROCODONE BITARTRATE AND ACETAMINOPHEN 5; 325 MG/1; MG/1
1 TABLET ORAL 3 TIMES DAILY
Status: DISCONTINUED | OUTPATIENT
Start: 2022-03-12 | End: 2022-03-13 | Stop reason: HOSPADM

## 2022-03-12 RX ADMIN — ATORVASTATIN CALCIUM 40 MG: 40 TABLET, FILM COATED ORAL at 21:29

## 2022-03-12 RX ADMIN — ENOXAPARIN SODIUM 40 MG: 100 INJECTION SUBCUTANEOUS at 08:21

## 2022-03-12 RX ADMIN — MORPHINE SULFATE 2 MG: 2 INJECTION, SOLUTION INTRAMUSCULAR; INTRAVENOUS at 23:21

## 2022-03-12 RX ADMIN — ACETAMINOPHEN 650 MG: 325 TABLET ORAL at 08:20

## 2022-03-12 RX ADMIN — SODIUM CHLORIDE, PRESERVATIVE FREE 10 ML: 5 INJECTION INTRAVENOUS at 08:20

## 2022-03-12 RX ADMIN — KETOROLAC TROMETHAMINE 30 MG: 30 INJECTION, SOLUTION INTRAMUSCULAR; INTRAVENOUS at 18:53

## 2022-03-12 RX ADMIN — HYDROCODONE BITARTRATE AND ACETAMINOPHEN 1 TABLET: 5; 325 TABLET ORAL at 21:29

## 2022-03-12 RX ADMIN — HYDROCODONE BITARTRATE AND ACETAMINOPHEN 1 TABLET: 5; 325 TABLET ORAL at 14:09

## 2022-03-12 RX ADMIN — GABAPENTIN 600 MG: 300 CAPSULE ORAL at 21:29

## 2022-03-12 RX ADMIN — GABAPENTIN 600 MG: 300 CAPSULE ORAL at 12:17

## 2022-03-12 RX ADMIN — BUTALBITAL, ACETAMINOPHEN, AND CAFFEINE 1 TABLET: 50; 325; 40 TABLET ORAL at 12:17

## 2022-03-12 RX ADMIN — SODIUM CHLORIDE, PRESERVATIVE FREE 10 ML: 5 INJECTION INTRAVENOUS at 21:30

## 2022-03-12 ASSESSMENT — PAIN SCALES - GENERAL
PAINLEVEL_OUTOF10: 8
PAINLEVEL_OUTOF10: 4
PAINLEVEL_OUTOF10: 2
PAINLEVEL_OUTOF10: 4
PAINLEVEL_OUTOF10: 7
PAINLEVEL_OUTOF10: 4
PAINLEVEL_OUTOF10: 8

## 2022-03-12 ASSESSMENT — PAIN DESCRIPTION - DESCRIPTORS
DESCRIPTORS: HEADACHE
DESCRIPTORS: HEADACHE

## 2022-03-12 ASSESSMENT — PAIN DESCRIPTION - LOCATION
LOCATION: HEAD
LOCATION: HEAD

## 2022-03-12 ASSESSMENT — PAIN DESCRIPTION - PROGRESSION: CLINICAL_PROGRESSION: NOT CHANGED

## 2022-03-12 ASSESSMENT — PAIN DESCRIPTION - PAIN TYPE
TYPE: ACUTE PAIN
TYPE: ACUTE PAIN

## 2022-03-12 NOTE — H&P
History and Physical-[facility]       Name:  Zenia Bean /Age/Sex: 1985  (29 y.o. female)   MRN & CSN:  [id number] & [id number] Admission Date/Time: 3/11/2022  5:36 PM   Location:  ED33/ED-33 PCP: Trang Tanner MD       Hospital Day: 1    Assessment and Plan:   Zenia Bean is a 29 y.o.  female  who presents with <principal problem not specified>    1. Left-sided numbness and tingling: Patient reports symptoms have resolved, she did not have significant weakness on the left side, her strength is 5 out of 5 in both upper and lower extremities and symmetrical.  Head CT was unremarkable. CTA head and neck unremarkable. Stroke neurology wants her to have head MRI as well as MRA. She was loaded with Plavix, and given aspirin. 2. Chest pain: Atypical for ACS, chest pain is reproducible upon palpation of anterior chest wall. Initial troponin was negative. Place patient on telemetry. Continue serial cardiac troponins and monitoring. 3. History of fibromyalgia: Continue home dose of narcotics and gabapentin  4. Morbid obesity BMI of 53: Lifestyle changes recommended  5. Current smoker, smokes half to 1 pack of cigarettes daily, counseled. DVT Prophylaxis: Lovenox subcu   CODE STATUS: Full code      History of Present Illness:     Chief Complaint: <principal problem not specified>  Zenia Bean is a 29 y.o.  female  who presents with past medical history of morbid obesity, degenerative joint disease, on chronic narcotics, presented to the ED with complaints of headache, chest pain, left-sided weakness and numbness. Patient states she got up in the morning around 7 AM, put her kids in the bus, went back to sleep, got up around 11:30 AM started having some chest pain. Afterwards she developed some numbness and tingling in her left upper extremity and her face. She stated that her mother-in-law was at home and told her that her speech was slurred.   Patient was concerned, given that she has a family history of brain aneurysm, decided to come to the ED. She also reports headaches which she states are still persistent. She reports left-sided chest pain radiating to her back. Patient recently presented to the ED around 5 PM after her kids returned from school. Vital signs have been stable. Labs show glucose of 130, CBC with leukocytosis of 12,000, hemoglobin of 13.8, CMP was unremarkable, lipase is normal, initial troponin was negative, PT/INR unremarkable,  ESR of 31, CRP is pending. Per ED, patient was seen as a stroke alert, and he consulted telemetry neurology. No clear documentation of what her NIHSS score was. She had a head CT that did not show any acute intracranial abnormality. She had a head CT which was described as having poor quality, but did not show any flow-limiting arterial stenosis in the head and neck. Per ED, telemetry neurology would like patient to have MRA and MRI of the head to rule out vasculitis. Her chest x-ray did not show any cardiac pulmonary abnormality        Review of Systems:     Ten point ROS reviewed negative, unless as noted above    Objective:     No intake or output data in the 24 hours ending 03/11/22 1948     Vitals:   Vitals:    03/11/22 1731   BP: 131/69   Pulse: 84   Resp: 17   Temp: 98.3 °F (36.8 °C)   SpO2: 97%       Physical Exam:     GEN: Awake female, sitting upright in bed in no apparent distress. Appears given age. Morbidly obese    EYES: Pupils are equally round. No scleral erythema, discharge, or conjunctivitis. HEENT:Mucous membranes are moist. Oral pharynx without exudates, no evidence of thrush. NECK: Supple, no apparent thyromegaly or masses. RESPIRATORY: Clear to auscultation, no wheezes, rales or rhonchi. Symmetric chest movement while on room air. CARDIOVASCULAR: S1/S2 auscultated. Regular rate without appreciable murmurs, rubs, or gallops. No JVD or carotid bruits.  Peripheral pulses equal bilaterally and palpable. No peripheral edema. GASTROINTESTINAL:Abdomen is soft without significant tenderness, masses, or guarding. Bowel sounds are normoactive. Rectal exam deferred. GENITOURINARY:No costovertebral angle tenderness. Normal appearing external genitalia. Mendez catheter is not present. HEME/LYMPH:No palpable cervical lymphadenopathy and no hepatosplenomegaly. No petechiae or ecchymoses. MUSCULOSKELETAL:No gross joint deformities. SKIN:Normal coloration, warm, dry. NEURO:Cranial nerves appear grossly intact, normal speech, no lateralizing weakness. PSYCH:Awake, alert, oriented x 4. Affect appropriate. Past Medical History:      Past Medical History:   Diagnosis Date    Anxiety disorder     Arthritis     Asthma     Follows with Lizbeth    Bipolar disorder ([facility] [address]     Not on meds as of 12/3/19    Depression     currently not on meds (12/3/19)    Drug addiction ([facility] [address]     states previous addiction to vicodin (2011), denies current use. Hx + UDS for cocaine, 12/3/2019    Family history of heart disease     \"had to see Dr Alejandra Sosa and he said every thing ok- approx 2 yrs ago \"    H/O echocardiogram 12/19/2018    Technically difficult examination due to body habitus. Left ventricular function is low normal, EF is estimated at 45-50%. Mild concentric left ventricular hypertrophy. Mildly dilated left atrium. Right ventricular systolic pressure of 29 mm Hg. Mild tricuspid regurgitation, no evidence of any pericardial effusion.      KDGHLTXH(681.3)     Last headache:  12/2/19    History of exercise stress test 05/04/2017    treadmill    Miscarriage     x4    Pain management     Was Dr. Edna Oh - left his practice 11/2019    Prolonged emergence from general anesthesia     \"with last surgery had trouble keeping me awake and ended up admitted me- and oxygen level kept going down    Sleep apnea     sleep study 2019- has cpap does not wear daily    Wears glasses      PSHX:  has a past surgical history that includes Dilation and curettage of uterus; Tonsillectomy ();  section; Tooth Extraction; Tubal ligation (); Knee arthroscopy (Right, 2019); Carpal tunnel release (Left, 10/2019); Carpal tunnel release (Right, ); hernia repair (N/A, 2020); and Upper gastrointestinal endoscopy (N/A, 10/5/2020). Allergies: Allergies   Allergen Reactions    Latex Hives    Shellfish-Derived Products      \"break out like tomato and swell - trouble swallowing- with shrimp    Ultram [Tramadol Hcl] Hives       FAM HX: family history includes Diabetes in her mother; Heart Disease in her maternal grandfather, maternal grandmother, and mother; High Blood Pressure in her mother. Soc HX:   Social History     Socioeconomic History    Marital status: Single     Spouse name: None    Number of children: None    Years of education: None    Highest education level: None   Occupational History    None   Tobacco Use    Smoking status: Current Every Day Smoker     Packs/day: 1.00     Years: 18.00     Pack years: 18.00     Types: Cigarettes     Start date:     Smokeless tobacco: Never Used   Vaping Use    Vaping Use: Never used   Substance and Sexual Activity    Alcohol use: No    Drug use: Not Currently     Types: Marijuana Luca Free)     Comment: Last used: / per pt on 10/1/2020\"last used cocaine     Sexual activity: Yes     Partners: Male   Other Topics Concern    None   Social History Narrative    None     Social Determinants of Health     Financial Resource Strain:     Difficulty of Paying Living Expenses: Not on file   Food Insecurity:     Worried About Running Out of Food in the Last Year: Not on file    Crescencio of Food in the Last Year: Not on file   Transportation Needs:     Lack of Transportation (Medical): Not on file    Lack of Transportation (Non-Medical):  Not on file   Physical Activity:     Days of Exercise per Week: Not on file    Minutes of Exercise per Session: Not on file   Stress:     Feeling of Stress : Not on file   Social Connections:     Frequency of Communication with Friends and Family: Not on file    Frequency of Social Gatherings with Friends and Family: Not on file    Attends Pentecostal Services: Not on file    Active Member of [address] [place] [address] or Organizations: Not on file    Attends Club or Organization Meetings: Not on file    Marital Status: Not on file   Intimate Partner Violence:     Fear of Current or Ex-Partner: Not on file    Emotionally Abused: Not on file    Physically Abused: Not on file    Sexually Abused: Not on file   Housing Stability:     Unable to Pay for Housing in the Last Year: Not on file    Number of [place] in the Last Year: Not on file    Unstable Housing in the Last Year: Not on file       Medications:   Medications:    ondansetron  4 mg IntraVENous Once    nicotine  1 patch TransDERmal Daily      Infusions:   PRN Meds:        Electronically signed by Willow Elliott MD on 3/11/2022 at 7:48 PM

## 2022-03-12 NOTE — CONSULTS
Neurology Service Consult Note  [unfilled]   Patient Name: Zenia Bean  : 1985        Subjective:   Reason for consult:   29 y.o. -handed female presenting to [facility] with chest pain and slurring of her words. She started with a severe headache and then numbness in her left hand and face. She has been having episodes where she has chest pain, sits down and passes out. She attributed this to her untreated BASHIR. Past Medical History:   Diagnosis Date    Anxiety disorder     Arthritis     Asthma     Follows with Lizbeth    Bipolar disorder ([facility] [address]     Not on meds as of 12/3/19    Depression     currently not on meds (12/3/19)    Drug addiction ([facility] [address]     states previous addiction to vicodin (), denies current use. Hx + UDS for cocaine, 12/3/2019    Family history of heart disease     \"had to see Dr Tiny Valladares and he said every thing ok- approx 2 yrs ago \"    H/O echocardiogram 2018    Technically difficult examination due to body habitus. Left ventricular function is low normal, EF is estimated at 45-50%. Mild concentric left ventricular hypertrophy. Mildly dilated left atrium. Right ventricular systolic pressure of 29 mm Hg. Mild tricuspid regurgitation, no evidence of any pericardial effusion.      KNUVMUVQ(928.9)     Last headache:  19    History of exercise stress test 2017    treadmill    Miscarriage     x4    Pain management     Was Dr. Raul Felix - left his practice 2019    Prolonged emergence from general anesthesia     \"with last surgery had trouble keeping me awake and ended up admitted me- and oxygen level kept going down    Sleep apnea     sleep study - has cpap does not wear daily    Wears glasses     :   Past Surgical History:   Procedure Laterality Date    CARPAL TUNNEL RELEASE Left 10/2019    Dr. Aleksandra Mcgraw Right 2016     SECTION       &    [id number] [facility] OF UTERUS       & [address] REPAIR N/A 1/9/2020    HERNIA SUPRAUMBILICAL/ VENTRAL REPAIR LAPAROSCOPIC ROBOTIC WITH MESH performed by Palma Foss MD at [facility] ARTHROSCOPY Right 07/2019    Dr. Jacqueline Franco      Several on bottom & top - no partials    TUBAL LIGATION  2014    UPPER GASTROINTESTINAL ENDOSCOPY N/A 10/5/2020    EGD BIOPSY performed by Palma Foss MD at [facility] ENDOSCOPY     Medications:  Scheduled Meds:   nicotine  1 patch TransDERmal Daily    sodium chloride flush  5-40 mL IntraVENous 2 times per day    enoxaparin  40 mg SubCUTAneous Daily    nicotine  1 patch TransDERmal Daily     Continuous Infusions:   sodium chloride       PRN Meds:.sodium chloride flush, sodium chloride, ondansetron **OR** ondansetron, polyethylene glycol, acetaminophen **OR** acetaminophen    Allergies   Allergen Reactions    Latex Hives    Shellfish-Derived Products      \"break out like tomato and swell - trouble swallowing- with shrimp    Ultram [Tramadol Hcl] Hives     Social History     Socioeconomic History    Marital status: Single     Spouse name: Not on file    Number of children: Not on file    Years of education: Not on file    Highest education level: Not on file   Occupational History    Not on file   Tobacco Use    Smoking status: Current Every Day Smoker     Packs/day: 1.00     Years: 18.00     Pack years: 18.00     Types: Cigarettes     Start date: 2001    Smokeless tobacco: Never Used   Vaping Use    Vaping Use: Never used   Substance and Sexual Activity    Alcohol use: No    Drug use: Not Currently     Types: Marijuana Darinellenora Urena)     Comment: Last used: 2005/ per pt on 10/1/2020\"last used cocaine 2019    Sexual activity: Yes     Partners: Male   Other Topics Concern    Not on file   Social History Narrative    Not on file     Social Determinants of Health     Financial Resource Strain:     Difficulty of Paying Living Expenses: Not on file   Food Insecurity:     Worried About [facility] in the Last Year: Not on file    Crescencio of Food in the Last Year: Not on file   Transportation Needs:     Lack of Transportation (Medical): Not on file    Lack of Transportation (Non-Medical): Not on file   Physical Activity:     Days of Exercise per Week: Not on file    Minutes of Exercise per Session: Not on file   Stress:     Feeling of Stress : Not on file   Social Connections:     Frequency of Communication with Friends and Family: Not on file    Frequency of Social Gatherings with Friends and Family: Not on file    Attends Restorationist Services: Not on file    Active Member of [address] [place] [address] or Organizations: Not on file    Attends Club or Organization Meetings: Not on file    Marital Status: Not on file   Intimate Partner Violence:     Fear of Current or Ex-Partner: Not on file    Emotionally Abused: Not on file    Physically Abused: Not on file    Sexually Abused: Not on file   Housing Stability:     Unable to Pay for Housing in the Last Year: Not on file    Number of [place] in the Last Year: Not on file    Unstable Housing in the Last Year: Not on file      Family History   Problem Relation Age of Onset    Diabetes Mother     High Blood Pressure Mother     Heart Disease Mother     Heart Disease Maternal Grandmother     Heart Disease Maternal Grandfather        Review of Symptoms:    10-point system review completed. All of which are negative except as mentioned above.     Physical Exam:       @[facility]@     Gen: A&O x 4, NAD, cooperative  HEENT: NC/AT, EOMI, PERRL, mmm, no carotid bruits, neck supple, no meningeal signs; Fundoscopic: no disc edema appreciated  Heart: RRR, S1S2  Lungs: CTAB  Ext: no edema, no calf tenderness b/l  Psych: normal mood and affect  Skin: no rashes or lesions    NEUROLOGIC EXAM:    Mental Status: A&O to self, location, month and year, NAD, speech clear, language fluent, repetition and naming intact, follows commands appropriately    Cranial Nerve Exam:   CN II-XII: No disc edema appreciated on fundoscopic examination, PERRL, VFF, no nystagmus, no gaze paresis, sensation V1-V3 intact b/l, muscles of facial expression symmetric; hearing intact to conversational tone, palate elevates symmetrically, shoulder elevation symmetric and tongue protrudes midline with movement side to side. Motor Exam:       Strength 5/5 UE's/LE's b/l  Tone and bulk normal   No pronator drift    Deep Tendon Reflexes: 2/4 biceps, triceps, brachioradialis, patellar, and achilles b/l; flexor plantar responses b/l    Sensation: Intact light touch/pinprick/vibration UE's/LE's b/l    Coordination/Cerebellum:       Tremors--none      Rapidly alternating movements: no dysdiadochokinesia b/l                Heel-to-Shin: no dysmetria b/l      Finger-to-Nose: no dysmetria b/l    Gait and stance:      Gait: deferred      LABS:     Recent Labs     03/11/22  1731   WBC 12.0*      K 3.5      CO2 25   BUN 11   CREATININE 0.6   GLUCOSE 135*   INR 0.98   ESR 31*   GETLIPIDS@  [email or web address] TSH Results,@[facility]@,     Last Liver Function Results:  @LABRCNT(ALT:3,AST:3,BILITOTAL:3,BILIDIR:3,ALKPHOS:3)@          IMAGING:          ASSESSMENT/PLAN:     1. Thank you for allowing us to participate in the care of your patient. If there are any questions regarding evaluation please feel free to contact us.      Eliceo Acosta DO, 3/12/2022

## 2022-03-12 NOTE — PROGRESS NOTES
Hospitalist Progress Note      Name:  Luann Olivo /Age/Sex: 1985  (29 y.o. female)   MRN & CSN:  [id number] & [id number] Admission Date/Time: 3/11/2022  5:36 PM   [facility] PCP: Pantera Meier, [address] Day: 2                                               Attending Physician Dr. Padmini Harris and Plan:   Luann Olivo is a 29 y.o.  female  who presents with Chest pain     Stroke-like symptoms-presents with left-sided numbness and tingling/slurred speech. Now resolved   CT head/CTA-nonacute   Neuro checks/ NIHSS   Telemetry    MRI/MRA   Echo pending   Neurology consulted    ASA/Statin on medication regimen     Chest pain-atypical   Troponin trend negative   EKG: No acute ischemic changes    Chronic pain syndrome/fibromyalgia   Gabapentin/Norco OARRS report verified (last filled 3/3/2022)    Lung nodule   Initial diagnosis 10/2020 1-1.3 cm average dimension nodule right lower lobe with minimal spiculated margins   Follow-up CT    Headache   PRN symptom control    Obesity- Body mass index is 53 kg/m². Lifestyle modifications needed    Tobacco Use   Smoking cessation protocol   Nicotine supplementation     Diet ADULT DIET; Regular   DVT Prophylaxis [] Lovenox, []  Heparin, [] SCDs, [] Ambulation   GI Prophylaxis [] PPI,  [] H2 Blocker,  [] Carafate,  [] Diet/Tube Feeds   Code Status Full Code     -Patient assessment and plan discussed with supervising physician-  Subjective 3/12/2022     Luann Olivo is a 29 y.o.  female, who presented with Chest Pain and Tingling (left arm)  . Patient reports concerns of a headache and restarting home medications. Reports symptoms are resolved  Expresses concern about previous lung nodule states she has not followed up on    Ten point ROS reviewed negative, unless as noted above    Objective:        Intake/Output Summary (Last 24 hours) at 3/12/2022 1246  Last data filed at 3/12/2022 0236  Gross per 24 hour   Intake 120 ml Output --   Net 120 ml      Vitals:   Vitals:    03/12/22 0129 03/12/22 0205 03/12/22 0236 03/12/22 0809   BP: (!) 114/51 (!) 106/51 110/70 (!) 110/58   Pulse: 84 70 81 67   Resp: 14 21 16 14   Temp:   98.1 °F (36.7 °C) 97.6 °F (36.4 °C)   TempSrc:   Oral Oral   SpO2: 95% 96% 95% 94%   Weight:       Height:         Physical Exam: 03/12/22     Gen:  awake, alert, cooperative, no apparent distress obese body habitus  Head/Eyes:  Normocephalic atraumatic, EOMI   NECK:   symmetrical, trachea midline  LUNGS: Normal Effort/ symmetry movement   CARDIOVASCULAR:  Normal rate Tele SR  ABDOMEN: Non tender, non distended, no HSM noted. MUSCULOSKELETAL: no gross deformities  NEUROLOGIC: Alert and Oriented,  Cranial nerves II-XII are grossly intact. SKIN:  no bruising or bleeding, normal skin color,  no redness    Medications:   Medications:    atorvastatin  40 mg Oral Nightly    gabapentin  600 mg Oral TID    HYDROcodone-acetaminophen  1 tablet Oral TID    nicotine  1 patch TransDERmal Daily    sodium chloride flush  5-40 mL IntraVENous 2 times per day    enoxaparin  40 mg SubCUTAneous Daily    nicotine  1 patch TransDERmal Daily      Infusions:    sodium chloride       PRN Meds: butalbital-acetaminophen-caffeine, 1 tablet, Q4H PRN  sodium chloride flush, 5-40 mL, PRN  sodium chloride, 25 mL, PRN  ondansetron, 4 mg, Q8H PRN   Or  ondansetron, 4 mg, Q6H PRN  polyethylene glycol, 17 g, Daily PRN  acetaminophen, 650 mg, Q6H PRN   Or  acetaminophen, 650 mg, Q6H PRN        LABS  CBC   Recent Labs     03/11/22  1731   WBC 12.0*   HGB 13.8   HCT 43.2         RENAL  Recent Labs     03/11/22  1731      K 3.5      CO2 25   BUN 11   CREATININE 0.6     LFT'S  Recent Labs     03/11/22  1731   AST 17   ALT 19   BILITOT 0.3   ALKPHOS 78     COAG  Recent Labs     03/11/22  1731   INR 0.98     CARDIAC ENZYMES  No results for input(s): CKTOTAL, CKMB, CKMBINDEX, TROPONINI in the last 72 hours.       Radiology this visit:  Reviewed. CT HEAD WO CONTRAST    Result Date: 3/11/2022  EXAMINATION: CT OF THE HEAD WITHOUT CONTRAST  3/11/2022 5:56 pm TECHNIQUE: CT of the head was performed without the administration of intravenous contrast. Dose modulation, iterative reconstruction, and/or weight based adjustment of the mA/kV was utilized to reduce the radiation dose to as low as reasonably achievable. COMPARISON: 11/22/2021 HISTORY: ORDERING SYSTEM PROVIDED HISTORY: stroke TECHNOLOGIST PROVIDED HISTORY: Reason for exam:->stroke Has a \"code stroke\" or \"stroke alert\" been called? ->Yes Decision Support Exception - unselect if not a suspected or confirmed emergency medical condition->Emergency Medical Condition (MA) Is the patient pregnant?->No Reason for Exam: stroke FINDINGS: BRAIN/VENTRICLES: There is no acute intracranial hemorrhage, mass effect or midline shift. No abnormal extra-axial fluid collection. The gray-white differentiation is maintained without evidence of an acute infarct. There is no evidence of hydrocephalus. ORBITS: The visualized portion of the orbits demonstrate no acute abnormality. SINUSES: The visualized paranasal sinuses and mastoid air cells demonstrate no acute abnormality. SOFT TISSUES/SKULL:  No acute abnormality of the visualized skull or soft tissues. No acute intracranial abnormality. Findings were discussed with LETICIA JAEGER at 6:03 pm on 3/11/2022. XR CHEST PORTABLE    Result Date: 3/11/2022  EXAMINATION: ONE XRAY VIEW OF THE CHEST 3/11/2022 6:18 pm COMPARISON: None. HISTORY: ORDERING SYSTEM PROVIDED HISTORY: chest pain TECHNOLOGIST PROVIDED HISTORY: Reason for exam:->chest pain Reason for Exam: chest pain Additional signs and symptoms: NA Relevant Medical/Surgical History: asthma FINDINGS: The lungs are clear, no effusion. No pneumothorax. Heart is normal size. Mediastinal and hilar contours are within normal limits. Bony thorax no acute abnormality.      1. No acute cardiopulmonary

## 2022-03-12 NOTE — CONSULTS
Neurology Service Consult Note  [facility]  Patient Name: Mei Quispe  : 1985        Subjective:   Reason for consult:   Patient seen and examined. Chart reviewed in detail. 29 y.o. -female with PMH anxiety disorder, bipolar, pression, obesity, DJD, drug addiction, and sleep apnea presenting to [facility] complaint of headaches and altered sensation to left side of face, slurred speech and arm associated with chest pain. Patient reports that she came to the ED for concerns as she has a family history of brain aneurysm. Patient was a stroke alert with [facility] telemed, did not receive TPA. [facility] concern for vasculitis and requested MRA/MRI at that time. Neurology being consulted for stroke evaluation. Prior work-up includes CBC showing leukocytosis 12, CMP unremarkable, CTh, nonacute CTA head and neck nonacute, MRI ordered per IM team. On exam, patient denies, numbness/ tingling, vision/speech changes, dizziness, or unilateral weakness at this time. Patient states that she just feels overall tired, exam is nonfocal/nonlateralizing. Past Medical History:   Diagnosis Date    Anxiety disorder     Arthritis     Asthma     Follows with Lizbeth    Bipolar disorder ([facility] [address]     Not on meds as of 12/3/19    Depression     currently not on meds (12/3/19)    Drug addiction ([facility] [address]     states previous addiction to vicodin (), denies current use. Hx + UDS for cocaine, 12/3/2019    Family history of heart disease     \"had to see Dr [facility] and he said every thing ok- approx 2 yrs ago \"    H/O echocardiogram 2018    Technically difficult examination due to body habitus. Left ventricular function is low normal, EF is estimated at 45-50%. Mild concentric left ventricular hypertrophy. Mildly dilated left atrium. Right ventricular systolic pressure of 29 mm Hg. Mild tricuspid regurgitation, no evidence of any pericardial effusion.      Headache(784.0)     Last headache:  19    History of exercise stress test 2017    treadmill    Miscarriage     x4    Pain management     Was Dr. Samaria Pérez - left his practice 2019    Prolonged emergence from general anesthesia     \"with last surgery had trouble keeping me awake and ended up admitted me- and oxygen level kept going down    Sleep apnea     sleep study 2019- has cpap does not wear daily    Wears glasses     :   Past Surgical History:   Procedure Laterality Date    CARPAL TUNNEL RELEASE Left 10/2019    Dr. Mayer Pass Right 2016     SECTION       & 2014    [facility]       &     HERNIA REPAIR N/A 2020    HERNIA SUPRAUMBILICAL/ VENTRAL REPAIR LAPAROSCOPIC ROBOTIC WITH MESH performed by Richard Berry MD at [place] [address]. ARTHROSCOPY Right 2019    Dr. Froylan Romero      Several on bottom & top - no partials    TUBAL LIGATION      UPPER GASTROINTESTINAL ENDOSCOPY N/A 10/5/2020    EGD BIOPSY performed by Richard [facility] MD at [facility] ENDOSCOPY     Medications:  Scheduled Meds:   nicotine  1 patch TransDERmal Daily    sodium chloride flush  5-40 mL IntraVENous 2 times per day    enoxaparin  40 mg SubCUTAneous Daily    nicotine  1 patch TransDERmal Daily     Continuous Infusions:   sodium chloride       PRN Meds:.sodium chloride flush, sodium chloride, ondansetron **OR** ondansetron, polyethylene glycol, acetaminophen **OR** acetaminophen    Allergies   Allergen Reactions    Latex Hives    Shellfish-Derived Products      \"break out like tomato and swell - trouble swallowing- with shrimp    Ultram [Tramadol Hcl] Hives     Social History     Socioeconomic History    Marital status: Single     Spouse name: Not on file    Number of children: Not on file    Years of education: Not on file    Highest education level: Not on file   Occupational History    Not on file   Tobacco Use    Smoking status: Current Every Day Smoker     Packs/day: 1.00     Years: 18.00     Pack years: 18.00     Types: Cigarettes     Start date: 2001    Smokeless tobacco: Never Used   Vaping Use    Vaping Use: Never used   Substance and Sexual Activity    Alcohol use: No    Drug use: Not Currently     Types: Marijuana Eudelia Smiley)     Comment: Last used: 2005/ per pt on 10/1/2020\"last used cocaine 2019    Sexual activity: Yes     Partners: Male   Other Topics Concern    Not on file   Social History Narrative    Not on file     Social Determinants of Health     Financial Resource Strain:     Difficulty of Paying Living Expenses: Not on file   Food Insecurity:     Worried About [address] in the Last Year: Not on file    Crescencio of Food in the Last Year: Not on file   Transportation Needs:     Lack of Transportation (Medical): Not on file    Lack of Transportation (Non-Medical):  Not on file   Physical Activity:     Days of Exercise per Week: Not on file    Minutes of Exercise per Session: Not on file   Stress:     Feeling of Stress : Not on file   Social Connections:     Frequency of Communication with Friends and Family: Not on file    Frequency of Social Gatherings with Friends and Family: Not on file    Attends Yazidi Services: Not on file    Active Member of Clubs or Organizations: Not on file    Attends Club or Organization Meetings: Not on file    Marital Status: Not on file   Intimate Partner Violence:     Fear of Current or Ex-Partner: Not on file    Emotionally Abused: Not on file    Physically Abused: Not on file    Sexually Abused: Not on file   Housing Stability:     Unable to Pay for Housing in the Last Year: Not on file    Number of [place] in the Last Year: Not on file    Unstable Housing in the Last Year: Not on file      Family History   Problem Relation Age of Onset    Diabetes Mother     High Blood Pressure Mother     Heart Disease Mother     Heart Disease Maternal Grandmother     Heart Disease Maternal Grandfather          ROS with movement side to side. Motor Exam:       Strength 5/5 UE's/LE's b/l  Tone and bulk normal   No pronator drift    Deep Tendon Reflexes: 2/4 biceps, triceps, brachioradialis, patellar, and achilles b/l; flexor plantar responses b/l    Sensation: Intact light touch/pinprick/vibration UE's/LE's b/l    Coordination/Cerebellum:       Tremors--none      Rapidly alternating movements: no dysdiadochokinesia b/l                Heel-to-Shin: no dysmetria b/l      Finger-to-Nose: no dysmetria b/l    Gait and stance:      Gait: deferred      LABS:        CBC:   Recent Labs     03/11/22  1731   WBC 12.0*   RBC 4.62   HGB 13.8   HCT 43.2      MCV 93.5     BMP:    Recent Labs     03/11/22  1731      K 3.5      CO2 25   BUN 11   CREATININE 0.6   GLUCOSE 135*   CALCIUM 9.1       IMAGING:    CTh  Impression   No acute intracranial abnormality. CTA head and neck     Impression   No flow-limiting arterial stenosis in the head and neck. Above imaging personally reviewed in detail. ASSESSMENT/PLAN:   29 y.o. -female with PMH anxiety disorder, bipolar, pression, obesity, DJD, drug addiction, and sleep apnea presenting to [facility] complaint of headaches and altered sensation to left side of face, slurred speech and arm associated with chest pain. Headache, left facial and arm paresthesia and paralysis possibly due to acute intracranial ischemia v. Vasculitis  --Patient was loaded on Plavix and aspirin x1 dose in ED  --We will hold off resuming above medications until after MRI for now. Neurodiagnostics  --CT as above  --CTA of head/ neck as above  --MRI brain ordered  --Further recommendations pending neurodiagnostics  Patient discussed with attending physician Dr. Zahida Siddiqui    Thank you for allowing us to participate in the care of your patient. If there are any questions regarding evaluation please feel free to contact us.      (Please note that portions of this note were completed with a voice recognition program.  Efforts were made to edit the dictations but occasionally words are mistranscribed.)      Deann Lopez, APRN - CNP, 3/12/2022    Attending Note:  I have rounded on this patient with Deann VILLANUEVA. I have reviewed the chart and we have discussed this case in detail. The patient was seen and examined by myself. Pertinent labs and imaging have been personally reviewed. Our findings and impressions were discussed with the patient. I concur with the NP's assessment and plan.     Yesenia Chowdary, DO

## 2022-03-13 VITALS
SYSTOLIC BLOOD PRESSURE: 129 MMHG | HEART RATE: 78 BPM | RESPIRATION RATE: 16 BRPM | WEIGHT: 293 LBS | HEIGHT: 71 IN | DIASTOLIC BLOOD PRESSURE: 71 MMHG | TEMPERATURE: 99 F | BODY MASS INDEX: 41.02 KG/M2 | OXYGEN SATURATION: 93 %

## 2022-03-13 PROCEDURE — 6360000002 HC RX W HCPCS: Performed by: INTERNAL MEDICINE

## 2022-03-13 PROCEDURE — 6370000000 HC RX 637 (ALT 250 FOR IP): Performed by: INTERNAL MEDICINE

## 2022-03-13 PROCEDURE — 6370000000 HC RX 637 (ALT 250 FOR IP): Performed by: NURSE PRACTITIONER

## 2022-03-13 PROCEDURE — 96372 THER/PROPH/DIAG INJ SC/IM: CPT

## 2022-03-13 PROCEDURE — 6370000000 HC RX 637 (ALT 250 FOR IP): Performed by: EMERGENCY MEDICINE

## 2022-03-13 PROCEDURE — 2580000003 HC RX 258: Performed by: INTERNAL MEDICINE

## 2022-03-13 PROCEDURE — G0378 HOSPITAL OBSERVATION PER HR: HCPCS

## 2022-03-13 RX ORDER — BUTALBITAL, ACETAMINOPHEN AND CAFFEINE 50; 325; 40 MG/1; MG/1; MG/1
1 TABLET ORAL EVERY 4 HOURS PRN
Qty: 180 TABLET | Refills: 3 | Status: SHIPPED | OUTPATIENT
Start: 2022-03-13

## 2022-03-13 RX ORDER — ATORVASTATIN CALCIUM 40 MG/1
40 TABLET, FILM COATED ORAL NIGHTLY
Qty: 30 TABLET | Refills: 3 | Status: SHIPPED | OUTPATIENT
Start: 2022-03-13

## 2022-03-13 RX ADMIN — HYDROCODONE BITARTRATE AND ACETAMINOPHEN 1 TABLET: 5; 325 TABLET ORAL at 09:12

## 2022-03-13 RX ADMIN — ENOXAPARIN SODIUM 40 MG: 100 INJECTION SUBCUTANEOUS at 09:12

## 2022-03-13 RX ADMIN — SODIUM CHLORIDE, PRESERVATIVE FREE 10 ML: 5 INJECTION INTRAVENOUS at 09:15

## 2022-03-13 RX ADMIN — GABAPENTIN 600 MG: 300 CAPSULE ORAL at 05:49

## 2022-03-13 ASSESSMENT — PAIN SCALES - GENERAL
PAINLEVEL_OUTOF10: 0
PAINLEVEL_OUTOF10: 7

## 2022-03-13 NOTE — DISCHARGE SUMMARY
Discharge Summary    Name:  Demetri Servin /Age/Sex: 1985  (29 y.o. female)   MRN & CSN:  [id number] & [id number] Admission Date/Time: 3/11/2022  5:36 PM   Attending:  Bib Shepherd MD Discharging Provider [place], [address] [place] [address] Course:   Demetri Servin is a 29 y.o.  female  who presents with Chest pain    Hospital Course: The patient was initially admitted 3/11/2022 for evaluation of left-sided numbness and tingling/dysarthria. Symptoms resolved shortly after arriving. She was evaluated by neurology with neurodiagnostics revealing no acute CVA. She was pending an echocardiogram desire to discharge and will have as outpatient. She has a recent noted lung nodule via CT (10/2021). Radiology recommended follow-up CT was pending upon discharge and will follow up with PCP as needed. She did complain of migraine type symptoms that were improved with Fioricet. She will be discharged home in stable condition. Stroke-like symptoms-presents with left-sided numbness and tingling/slurred speech. Now resolved              CT head/CTA-nonacute              NIHSS remained 0              MRI (3/12)-nonacute              Echo as outpatient              Neurology consulted-schedule outpatient follow-up              Statin Rx sent-defer to PCP for continuation (, HDL 33, , )                Chest pain-atypical suspect musculoskeletal ACS ruled out              Troponin trend negative              EKG: No acute ischemic changes     Chronic pain syndrome/fibromyalgia              Gabapentin/Norco OARRS report verified (last filled 3/3/2022)     Lung nodule              Initial diagnosis 10/2020 1-1.3 cm average dimension nodule right lower lobe with minimal spiculated margins              Follow-up CT pending. Follow-up with PCP     Headache              PRN  Fioricet     Obesity- Body mass index is 53 kg/m².  Lifestyle modifications needed     Tobacco Use Smoking cessation protocol              Nicotine supplementation       The patient expressed appropriate understanding of and agreement with the discharge recommendations, medications, and plan. Consults this admission:  IP CONSULT TO HOSPITALIST  IP CONSULT TO NEUROLOGY    Discharge Instruction:   Follow up appointments: Neurology  Primary care physician:  within 2 weeks    Diet:  low fat, low cholesterol diet   Activity: activity as tolerated  Disposition: Discharged to:   [x]Home, [][facility], []SNF, []Acute Rehab, []Hospice   Condition on discharge: Stable    Discharge Medications:        Medication List      START taking these medications    atorvastatin 40 MG tablet  Commonly known as: LIPITOR  Take 1 tablet by mouth nightly     butalbital-acetaminophen-caffeine -40 MG per tablet  Commonly known as: FIORICET, ESGIC  Take 1 tablet by mouth every 4 hours as needed for Headaches        CONTINUE taking these medications    albuterol sulfate  (90 Base) MCG/ACT inhaler  Inhale 2 puffs into the lungs 4 times daily     fluticasone-vilanterol 100-25 MCG/INH Aepb inhaler  Commonly known as: BREO ELLIPTA  Inhale 1 puff into the lungs daily     gabapentin 600 MG tablet  Commonly known as: NEURONTIN     HYDROcodone-acetaminophen 5-325 MG per tablet  Commonly known as: NORCO     ibuprofen 600 MG tablet  Commonly known as: ADVIL;MOTRIN  Take 1 tablet by mouth every 6 hours as needed for Pain     Knee Brace/Cushion/L-XL Misc  Please dispense one knee brace to be worn for 8 hrs during the day and taken off at nighttime for right knee for comfort.      meloxicam 15 MG tablet  Commonly known as: MOBIC     naproxen 500 MG tablet  Commonly known as: NAPROSYN  Take 1 tablet by mouth 2 times daily (with meals)        STOP taking these medications    acetaminophen 500 MG tablet  Commonly known as: TYLENOL     baclofen 10 MG tablet  Commonly known as: LIORESAL     cetirizine 10 MG tablet  Commonly known as: ZYRTEC guaiFENesin 600 MG extended release tablet  Commonly known as: MUCINEX     hydrOXYzine 25 MG capsule  Commonly known as: VISTARIL     propranolol 20 MG tablet  Commonly known as: INDERAL     Vyvanse 50 MG capsule  Generic drug: lisdexamfetamine           Where to Get Your Medications      These medications were sent to [facility] [address], [address] [place] [address] [facility] [address] [place] [address] [phone] [address], [address] [place] [address] Drive    Phone: [phone]   · atorvastatin 40 MG tablet  · butalbital-acetaminophen-caffeine -40 MG per tablet         Objective Findings at Discharge:     Vitals:    03/12/22 0809 03/12/22 1615 03/12/22 2115 03/13/22 0755   BP: (!) 110/58 110/63 (!) 126/52 129/71   Pulse: 67 70 71 78   Resp: 14 16 16 16   Temp: 97.6 °F (36.4 °C)  98.4 °F (36.9 °C) 99 °F (37.2 °C)   TempSrc: Oral  Oral Oral   SpO2: 94% 99% 96% 93%   Weight:       Height:                  Physical Exam: 03/13/22     Gen:  awake, alert, cooperative, no apparent distress obese body habitus  Head/Eyes:  Normocephalic atraumatic, EOMI   NECK:   symmetrical, trachea midline  LUNGS: Normal Effort/ symmetry movement   CARDIOVASCULAR:  Normal rate Tele SR  ABDOMEN: Non tender, non distended, no HSM noted. MUSCULOSKELETAL: no gross deformities  NEUROLOGIC: Alert and Oriented,  Cranial nerves II-XII are grossly intact. SKIN:  no bruising or bleeding, normal skin color,  no redness    Data:     Laboratory this visit:  Reviewed  Recent Labs     03/11/22  1731   WBC 12.0*   HGB 13.8   HCT 43.2         Recent Labs     03/11/22  1731      K 3.5      CO2 25   BUN 11   CREATININE 0.6     Recent Labs     03/11/22  1731   AST 17   ALT 19   BILITOT 0.3   ALKPHOS 78     Recent Labs     03/11/22  1731   INR 0.98     No results for input(s): CKTOTAL, CKMB, CKMBINDEX in the last 72 hours. Invalid input(s): Deidra Graves input(s): PRO-BNP    Radiology this visit:  Reviewed.     CT HEAD WO CONTRAST    Result Date: 3/11/2022  EXAMINATION: CT OF THE HEAD WITHOUT CONTRAST  3/11/2022 5:56 pm TECHNIQUE: CT of the head was performed without the administration of intravenous contrast. Dose modulation, iterative reconstruction, and/or weight based adjustment of the mA/kV was utilized to reduce the radiation dose to as low as reasonably achievable. COMPARISON: 11/22/2021 HISTORY: ORDERING SYSTEM PROVIDED HISTORY: stroke TECHNOLOGIST PROVIDED HISTORY: Reason for exam:->stroke Has a \"code stroke\" or \"stroke alert\" been called? ->Yes Decision Support Exception - unselect if not a suspected or confirmed emergency medical condition->Emergency Medical Condition (MA) Is the patient pregnant?->No Reason for Exam: stroke FINDINGS: BRAIN/VENTRICLES: There is no acute intracranial hemorrhage, mass effect or midline shift. No abnormal extra-axial fluid collection. The gray-white differentiation is maintained without evidence of an acute infarct. There is no evidence of hydrocephalus. ORBITS: The visualized portion of the orbits demonstrate no acute abnormality. SINUSES: The visualized paranasal sinuses and mastoid air cells demonstrate no acute abnormality. SOFT TISSUES/SKULL:  No acute abnormality of the visualized skull or soft tissues. No acute intracranial abnormality. Findings were discussed with LETICIA JAEGER at 6:03 pm on 3/11/2022. XR CHEST PORTABLE    Result Date: 3/11/2022  EXAMINATION: ONE XRAY VIEW OF THE CHEST 3/11/2022 6:18 pm COMPARISON: None. HISTORY: ORDERING SYSTEM PROVIDED HISTORY: chest pain TECHNOLOGIST PROVIDED HISTORY: Reason for exam:->chest pain Reason for Exam: chest pain Additional signs and symptoms: NA Relevant Medical/Surgical History: asthma FINDINGS: The lungs are clear, no effusion. No pneumothorax. Heart is normal size. Mediastinal and hilar contours are within normal limits. Bony thorax no acute abnormality. 1. No acute cardiopulmonary abnormality.      CTA HEAD NECK W CONTRAST    Result Date: 3/11/2022  EXAMINATION: CTA OF THE HEAD AND NECK WITH CONTRAST 3/11/2022 5:56 pm: TECHNIQUE: CTA of the head and neck was performed with the administration of intravenous contrast. Multiplanar reformatted images are provided for review. MIP images are provided for review. Stenosis of the internal carotid arteries measured using NASCET criteria. Dose modulation, iterative reconstruction, and/or weight based adjustment of the mA/kV was utilized to reduce the radiation dose to as low as reasonably achievable. COMPARISON: None. HISTORY: ORDERING SYSTEM PROVIDED HISTORY: stroke alert FINDINGS: CTA NECK: AORTIC ARCH/ARCH VESSELS: No dissection or arterial injury. No significant stenosis of the brachiocephalic or subclavian arteries. CAROTID ARTERIES: No dissection, arterial injury, or hemodynamically significant stenosis by NASCET criteria. VERTEBRAL ARTERIES: No dissection, arterial injury, or significant stenosis. SOFT TISSUES: The lung apices are clear. No cervical or superior mediastinal lymphadenopathy. The larynx and pharynx are unremarkable. No acute abnormality of the salivary and thyroid glands. BONES: No acute osseous abnormality. CTA HEAD: ANTERIOR CIRCULATION: No significant stenosis of the intracranial internal carotid, anterior cerebral, or middle cerebral arteries. No aneurysm. POSTERIOR CIRCULATION: No significant stenosis of the vertebral, basilar, or posterior cerebral arteries. No aneurysm. OTHER: No dural venous sinus thrombosis on this non-dedicated study. BRAIN: No mass effect or midline shift. No extra-axial fluid collection. The gray-white differentiation is maintained. No flow-limiting arterial stenosis in the head and neck.      MRI BRAIN WO CONTRAST    Result Date: 3/12/2022  EXAMINATION: MRI OF THE BRAIN WITHOUT CONTRAST  3/12/2022 1:38 pm TECHNIQUE: Multiplanar multisequence MRI of the brain was performed without the administration of intravenous contrast. COMPARISON: None HISTORY: ORDERING SYSTEM PROVIDED HISTORY: rule out CVA TECHNOLOGIST PROVIDED HISTORY: Reason for exam:->rule out CVA Decision Support Exception - unselect if not a suspected or confirmed emergency medical condition->Emergency Medical Condition (MA) Is the patient pregnant?->No Reason for Exam: r/o cva - best exam possible - seq repeated - pt snoring thru exam FINDINGS: INTRACRANIAL STRUCTURES/VENTRICLES: There is no acute infarct. No mass effect or midline shift. No evidence of an acute intracranial hemorrhage. The ventricles and sulci are normal in size and configuration. The sellar/suprasellar regions appear unremarkable. The normal signal voids within the major intracranial vessels appear maintained. ORBITS: The visualized portion of the orbits demonstrate no acute abnormality. SINUSES: The visualized paranasal sinuses and mastoid air cells demonstrate no acute abnormality. BONES/SOFT TISSUES: The bone marrow signal intensity appears normal. The soft tissues demonstrate no acute abnormality. No acute intracranial abnormality.          Discharge Time of < 30 minutes    Electronically signed by MARU Mcduffie CNP on 3/13/2022 at 10:01 AM

## 2022-03-13 NOTE — DISCHARGE INSTR - DIET

## 2022-03-14 ENCOUNTER — OFFICE VISIT (OUTPATIENT)
Dept: CARDIOLOGY CLINIC | Age: 37
End: 2022-03-14
Payer: COMMERCIAL

## 2022-03-14 VITALS
HEART RATE: 80 BPM | BODY MASS INDEX: 39.68 KG/M2 | SYSTOLIC BLOOD PRESSURE: 126 MMHG | DIASTOLIC BLOOD PRESSURE: 70 MMHG | WEIGHT: 293 LBS | HEIGHT: 72 IN | OXYGEN SATURATION: 95 %

## 2022-03-14 DIAGNOSIS — G45.9 TIA (TRANSIENT ISCHEMIC ATTACK): ICD-10-CM

## 2022-03-14 DIAGNOSIS — R07.9 CHEST PAIN, UNSPECIFIED TYPE: Primary | ICD-10-CM

## 2022-03-14 LAB
EKG ATRIAL RATE: 83 BPM
EKG DIAGNOSIS: NORMAL
EKG P AXIS: 12 DEGREES
EKG P-R INTERVAL: 164 MS
EKG Q-T INTERVAL: 372 MS
EKG QRS DURATION: 88 MS
EKG QTC CALCULATION (BAZETT): 437 MS
EKG R AXIS: 20 DEGREES
EKG T AXIS: 58 DEGREES
EKG VENTRICULAR RATE: 83 BPM

## 2022-03-14 PROCEDURE — 4004F PT TOBACCO SCREEN RCVD TLK: CPT | Performed by: INTERNAL MEDICINE

## 2022-03-14 PROCEDURE — 93000 ELECTROCARDIOGRAM COMPLETE: CPT | Performed by: INTERNAL MEDICINE

## 2022-03-14 PROCEDURE — G8484 FLU IMMUNIZE NO ADMIN: HCPCS | Performed by: INTERNAL MEDICINE

## 2022-03-14 PROCEDURE — G8417 CALC BMI ABV UP PARAM F/U: HCPCS | Performed by: INTERNAL MEDICINE

## 2022-03-14 PROCEDURE — 93010 ELECTROCARDIOGRAM REPORT: CPT | Performed by: INTERNAL MEDICINE

## 2022-03-14 PROCEDURE — 99214 OFFICE O/P EST MOD 30 MIN: CPT | Performed by: INTERNAL MEDICINE

## 2022-03-14 PROCEDURE — G8427 DOCREV CUR MEDS BY ELIG CLIN: HCPCS | Performed by: INTERNAL MEDICINE

## 2022-03-14 NOTE — PROGRESS NOTES
Farhat Guardado MD        OFFICE  FOLLOWUP NOTE    Chief complaints: patient is here for management of gastric sleeve, smoking, headache, ADHA, depression, r/o stroke    Subjective: patient feels numbness left sided    BEL Reinoso is a 29 y. o.year old who  has a past medical history of Anxiety disorder, Arthritis, Asthma, Bipolar disorder ([address] Depression, Drug addiction ([facility] [address] Family history of heart disease, H/O echocardiogram, Headache(784.0), History of exercise stress test, Miscarriage, Pain management, Prolonged emergence from general anesthesia, Sleep apnea, and Wears glasses. and presents for management of gastric sleeve, smoking, headache, ADHA, depression, r/o stroke   which are well controlled    She was discharged from [facility], she had left sided numbness and tingling and was discharged to follow up with neurology and also was recommend to get RONDA, HER MRI ruled out infarction, she still has left hand numbness. Current Outpatient Medications   Medication Sig Dispense Refill    butalbital-acetaminophen-caffeine (FIORICET, ESGIC) -40 MG per tablet Take 1 tablet by mouth every 4 hours as needed for Headaches 180 tablet 3    atorvastatin (LIPITOR) 40 MG tablet Take 1 tablet by mouth nightly 30 tablet 3    gabapentin (NEURONTIN) 600 MG tablet Take 600 mg by mouth in the morning, at noon, and at bedtime.  HYDROcodone-acetaminophen (NORCO) 5-325 MG per tablet Take 5-325 tablets by mouth in the morning, at noon, and at bedtime.       meloxicam (MOBIC) 15 MG tablet Take 15 mg by mouth daily as needed       ibuprofen (ADVIL;MOTRIN) 600 MG tablet Take 1 tablet by mouth every 6 hours as needed for Pain 28 tablet 0    fluticasone-vilanterol (BREO ELLIPTA) 100-25 MCG/INH AEPB inhaler Inhale 1 puff into the lungs daily 1 each 3    albuterol sulfate  (90 Base) MCG/ACT inhaler Inhale 2 puffs into the lungs 4 times daily 1 Inhaler 5    naproxen (NAPROSYN) 500 MG tablet Take 1 tablet by mouth 2 times daily (with meals) (Patient not taking: Reported on 3/14/2022) 180 tablet 1    Elastic Bandages & Supports (KNEE BRACE/CUSHION/L-XL) MISC Please dispense one knee brace to be worn for 8 hrs during the day and taken off at nighttime for right knee for comfort. (Patient not taking: Reported on 3/14/2022) 1 each 0     No current facility-administered medications for this visit. Allergies: Latex, Shellfish-derived products, and Ultram [tramadol hcl]  Past Medical History:   Diagnosis Date    Anxiety disorder     Arthritis     Asthma     Follows with Lizbeth    Bipolar disorder ([facility] [address]     Not on meds as of 12/3/19    Depression     currently not on meds (12/3/19)    Drug addiction ([facility] [address]     states previous addiction to vicodin (), denies current use. Hx + UDS for cocaine, 12/3/2019    Family history of heart disease     \"had to see Dr Trudy Corea and he said every thing ok- approx 2 yrs ago \"    H/O echocardiogram 2018    Technically difficult examination due to body habitus. Left ventricular function is low normal, EF is estimated at 45-50%. Mild concentric left ventricular hypertrophy. Mildly dilated left atrium. Right ventricular systolic pressure of 29 mm Hg. Mild tricuspid regurgitation, no evidence of any pericardial effusion.      SZXEVERM(277.5)     Last headache:  19    History of exercise stress test 2017    treadmill    Miscarriage     x4    Pain management     Was Dr. Selena Sauceda - left his practice 2019    Prolonged emergence from general anesthesia     \"with last surgery had trouble keeping me awake and ended up admitted me- and oxygen level kept going down    Sleep apnea     sleep study 2019- has cpap does not wear daily    Wears glasses      Past Surgical History:   Procedure Laterality Date    CARPAL TUNNEL RELEASE Left 10/2019    Dr. Sonya Parrish Right 2016     SECTION       &    [id number] OF UTERUS      2010 & 2011    HERNIA REPAIR N/A 1/9/2020    HERNIA SUPRAUMBILICAL/ VENTRAL REPAIR LAPAROSCOPIC ROBOTIC WITH MESH performed by Aliyah Saleem MD at [address] [place] [address] ARTHROSCOPY Right 07/2019    Dr. Sukumar Gant      Several on bottom & top - no partials    TUBAL LIGATION  2014    UPPER GASTROINTESTINAL ENDOSCOPY N/A 10/5/2020    EGD BIOPSY performed by Aliyah Saleem MD at [facility] ENDOSCOPY     Family History   Problem Relation Age of Onset    Diabetes Mother     High Blood Pressure Mother     Heart Disease Mother     Heart Disease Maternal Grandmother     Heart Disease Maternal Grandfather      Social History     Tobacco Use    Smoking status: Current Every Day Smoker     Packs/day: 1.00     Years: 18.00     Pack years: 18.00     Types: Cigarettes     Start date: 2001    Smokeless tobacco: Never Used   Substance Use Topics    Alcohol use: No      [unfilled]  Review of Systems:   · Constitutional: No Fever or Weight Loss   · Eyes: No Decreased Vision  · ENT: No Headaches, Hearing Loss or Vertigo  · Cardiovascular: No chest pain, dyspnea on exertion, palpitations or loss of consciousness  · Respiratory: No cough or wheezing    · Gastrointestinal: No abdominal pain, appetite loss, blood in stools, constipation, diarrhea or heartburn  · Genitourinary: No dysuria, trouble voiding, or hematuria  · Musculoskeletal:  No gait disturbance, weakness or joint complaints  · Integumentary: No rash or pruritis  · Neurological: No TIA or stroke symptoms  · Psychiatric: No anxiety or depression  · Endocrine: No malaise, fatigue or temperature intolerance  · Hematologic/Lymphatic: No bleeding problems, blood clots or swollen lymph nodes  · Allergic/Immunologic: No nasal congestion or hives  All systems negative except as marked.    Objective:  /70 (Site: Left Upper Arm, Position: Sitting, Cuff Size: Medium Adult)   Pulse 80   Ht 6' (1.829 m)   Wt (!) 390 lb (176.9 kg)   SpO2 95%   BMI 52.89 kg/m²   Wt Readings from Last 3 Encounters:   03/14/22 (!) 390 lb (176.9 kg)   03/11/22 (!) 380 lb (172.4 kg)   11/19/21 (!) 395 lb (179.2 kg)     Body mass index is 52.89 kg/m². GENERAL - Alert, oriented, pleasant, in no apparent distress,normal grooming  HEENT  pupils are intact, cornea intact, conjunctive normal color, ears are normal in exam,  Neck - Supple. No jugular venous distention noted. No carotid bruits, no apical -carotid delay  Respiratory:  Normal breath sounds, No respiratory distress, No wheezing, No chest tenderness. ,no use of accessory muscles, diaphragm movement is normal  Cardiovascular: (PMI) apex non displaced,no lifts no thrills, no s3,no s4, Normal heart rate, Normal rhythm, No murmurs, No rubs, No gallops. Carotid arteries pulse and amplitude are normal no bruit, no abdominal bruit noted ( normal abdominal aorta ausculation),   Extremities - No cyanosis, clubbing, or significant edema, no varicose veins    Abdomen  No masses, tenderness, or organomegaly, no hepato-splenomegally, no bruits  Musculoskeletal  No significant edema, no kyphosis or scoliosis, no deformity in any extremity noted, muscle strength and tone are normal  Skin: no ulcer,no scar,no stasis dermatitis   Neurologic  alert oriented times 3,Cranial nerves II through XII are grossly intact. There were no gross focal neurologic abnormalities.    Psychiatric: normal mood and affect    Lab Results   Component Value Date    CKTOTAL 112 04/28/2017    TROPONINI <0.006 11/14/2013     BNP:  No results found for: BNP  PT/INR:  No results found for: [facility]  Lab Results   Component Value Date    LABA1C 5.4 03/11/2022    LABA1C 5.2 07/30/2013     Lab Results   Component Value Date    CHOL 188 03/12/2022    TRIG 183 (H) 03/12/2022    HDL 33 (L) 03/12/2022    LDLCALC 118 (H) 03/12/2022    LDLDIRECT 133 (H) 07/30/2013     Lab Results   Component Value Date    ALT 19 03/11/2022    AST 17 03/11/2022 TSH:  No results found for: TSH  nsr  Impression:  Adolfo Rubi is a 29 y. o.year old who  has a past medical history of Anxiety disorder, Arthritis, Asthma, Bipolar disorder ([facility] [address] Depression, Drug addiction ([facility] [address] Family history of heart disease, H/O echocardiogram, Headache(784.0), History of exercise stress test, Miscarriage, Pain management, Prolonged emergence from general anesthesia, Sleep apnea, and Wears glasses. and presents with     Plan:  1. Left sided numbness, MRI of head ruled out infarctions, she has neurology appointment this week,does not have any history of afib, will defer RONDA at this time. Will order echo, her CTA was negative also  2. Headache\"  patient is on percocet, neurontin which can cause numbness, Patient is off fioricet  3. ADHD:off adderal  4. Depression and axiety: recommend psychiatrist evaluation. 5. Health maintenance: exerise and diet  All labs, medications and tests reviewed, continue all other medications of all above medical condition listed as is.

## 2022-03-14 NOTE — PATIENT INSTRUCTIONS
Please be informed that if you contact our office outside of normal business hours the physician on call cannot help with any scheduling or rescheduling issues, procedure instruction questions or any type of medication issue. We advise you for any urgent/emergency that you go to the nearest emergency room! PLEASE CALL OUR OFFICE DURING NORMAL BUSINESS HOURS    Monday - Friday   8 am to 5 pm    [address]: [phone]    Mackey:  [phone]      **It is YOUR responsibilty to bring medication bottles and/or updated medication list to [address] [place] [address]. This will allow us to better serve you and all your healthcare needs**  Please hold on to these instructions the  will call you within 1-9 business days when we receive authorization from your insurance. Echocardiogram    WHAT TO EXPECT:   ? This test will take approximately 45 minutes. ? It is an ultrasound of the heart. ? It can look at the valves and chambers inside the heart   ? There is no special instructions for this test.     If you are unable to keep this appointment, please notify us 24 hours prior to test at [phone].

## 2022-03-19 ENCOUNTER — PROCEDURE VISIT (OUTPATIENT)
Dept: CARDIOLOGY CLINIC | Age: 37
End: 2022-03-19
Payer: COMMERCIAL

## 2022-03-19 DIAGNOSIS — R07.9 CHEST PAIN, UNSPECIFIED TYPE: ICD-10-CM

## 2022-03-19 DIAGNOSIS — G45.9 TIA (TRANSIENT ISCHEMIC ATTACK): ICD-10-CM

## 2022-03-19 LAB
LV EF: 58 %
LVEF MODALITY: NORMAL

## 2022-03-19 PROCEDURE — 93306 TTE W/DOPPLER COMPLETE: CPT | Performed by: INTERNAL MEDICINE

## 2022-03-21 ENCOUNTER — TELEPHONE (OUTPATIENT)
Dept: CARDIOLOGY CLINIC | Age: 37
End: 2022-03-21

## 2022-08-18 ENCOUNTER — HOSPITAL ENCOUNTER (EMERGENCY)
Age: 37
Discharge: HOME | End: 2022-08-18
Payer: COMMERCIAL

## 2022-08-18 ENCOUNTER — APPOINTMENT (OUTPATIENT)
Dept: GENERAL RADIOLOGY | Age: 37
End: 2022-08-18
Payer: COMMERCIAL

## 2022-08-18 VITALS
OXYGEN SATURATION: 97 % | HEIGHT: 72 IN | WEIGHT: 293 LBS | DIASTOLIC BLOOD PRESSURE: 73 MMHG | TEMPERATURE: 98.1 F | BODY MASS INDEX: 39.68 KG/M2 | HEART RATE: 70 BPM | RESPIRATION RATE: 21 BRPM | SYSTOLIC BLOOD PRESSURE: 125 MMHG

## 2022-08-18 DIAGNOSIS — R07.9 CHEST PAIN, UNSPECIFIED TYPE: Primary | ICD-10-CM

## 2022-08-18 LAB
ALBUMIN SERPL-MCNC: 4.3 GM/DL (ref 3.4–5)
ALP BLD-CCNC: 69 IU/L (ref 40–129)
ALT SERPL-CCNC: 17 U/L (ref 10–40)
ANION GAP SERPL CALCULATED.3IONS-SCNC: 10 MMOL/L (ref 4–16)
AST SERPL-CCNC: 17 IU/L (ref 15–37)
BASOPHILS ABSOLUTE: 0.1 K/CU MM
BASOPHILS RELATIVE PERCENT: 0.5 % (ref 0–1)
BILIRUB SERPL-MCNC: 0.3 MG/DL (ref 0–1)
BUN BLDV-MCNC: 11 MG/DL (ref 6–23)
CALCIUM SERPL-MCNC: 8.9 MG/DL (ref 8.3–10.6)
CHLORIDE BLD-SCNC: 107 MMOL/L (ref 99–110)
CO2: 25 MMOL/L (ref 21–32)
CREAT SERPL-MCNC: 0.6 MG/DL (ref 0.6–1.1)
DIFFERENTIAL TYPE: ABNORMAL
EOSINOPHILS ABSOLUTE: 0.3 K/CU MM
EOSINOPHILS RELATIVE PERCENT: 2.3 % (ref 0–3)
GFR AFRICAN AMERICAN: >60 ML/MIN/1.73M2
GFR NON-AFRICAN AMERICAN: >60 ML/MIN/1.73M2
GLUCOSE BLD-MCNC: 91 MG/DL (ref 70–99)
HCG QUALITATIVE: NEGATIVE
HCT VFR BLD CALC: 42.2 % (ref 37–47)
HEMOGLOBIN: 13.6 GM/DL (ref 12.5–16)
IMMATURE NEUTROPHIL %: 0.3 % (ref 0–0.43)
LYMPHOCYTES ABSOLUTE: 2.2 K/CU MM
LYMPHOCYTES RELATIVE PERCENT: 19.9 % (ref 24–44)
MCH RBC QN AUTO: 29.5 PG (ref 27–31)
MCHC RBC AUTO-ENTMCNC: 32.2 % (ref 32–36)
MCV RBC AUTO: 91.5 FL (ref 78–100)
MONOCYTES ABSOLUTE: 0.6 K/CU MM
MONOCYTES RELATIVE PERCENT: 5.8 % (ref 0–4)
NUCLEATED RBC %: 0 %
PDW BLD-RTO: 12.7 % (ref 11.7–14.9)
PLATELET # BLD: 295 K/CU MM (ref 140–440)
PMV BLD AUTO: 9.6 FL (ref 7.5–11.1)
POTASSIUM SERPL-SCNC: 4 MMOL/L (ref 3.5–5.1)
RBC # BLD: 4.61 M/CU MM (ref 4.2–5.4)
SEGMENTED NEUTROPHILS ABSOLUTE COUNT: 7.7 K/CU MM
SEGMENTED NEUTROPHILS RELATIVE PERCENT: 71.2 % (ref 36–66)
SODIUM BLD-SCNC: 142 MMOL/L (ref 135–145)
TOTAL IMMATURE NEUTOROPHIL: 0.03 K/CU MM
TOTAL NUCLEATED RBC: 0 K/CU MM
TOTAL PROTEIN: 7.2 GM/DL (ref 6.4–8.2)
TROPONIN T: <0.01 NG/ML
WBC # BLD: 10.8 K/CU MM (ref 4–10.5)

## 2022-08-18 PROCEDURE — 80053 COMPREHEN METABOLIC PANEL: CPT

## 2022-08-18 PROCEDURE — 99285 EMERGENCY DEPT VISIT HI MDM: CPT

## 2022-08-18 PROCEDURE — 84703 CHORIONIC GONADOTROPIN ASSAY: CPT

## 2022-08-18 PROCEDURE — 6370000000 HC RX 637 (ALT 250 FOR IP): Performed by: EMERGENCY MEDICINE

## 2022-08-18 PROCEDURE — 71046 X-RAY EXAM CHEST 2 VIEWS: CPT

## 2022-08-18 PROCEDURE — 85025 COMPLETE CBC W/AUTO DIFF WBC: CPT

## 2022-08-18 PROCEDURE — 93005 ELECTROCARDIOGRAM TRACING: CPT | Performed by: EMERGENCY MEDICINE

## 2022-08-18 PROCEDURE — 84484 ASSAY OF TROPONIN QUANT: CPT

## 2022-08-18 RX ORDER — ASPIRIN 81 MG/1
324 TABLET, CHEWABLE ORAL ONCE
Status: COMPLETED | OUTPATIENT
Start: 2022-08-18 | End: 2022-08-18

## 2022-08-18 RX ADMIN — ASPIRIN 81 MG CHEWABLE TABLET 324 MG: 81 TABLET CHEWABLE at 18:56

## 2022-08-18 ASSESSMENT — HEART SCORE
ECG: 0
ECG: 0

## 2022-08-18 NOTE — ED NOTES
Reviewed discharge instructions, received paperwork, verbal understanding     Shila Nuñez RN  08/18/22 [id number]

## 2022-08-18 NOTE — ED PROVIDER NOTES
12 lead EKG per my interpretation:  Normal Sinus Rhythm at 63  Axis is   Normal  QTc is  normal  There is no specific T wave changes appreciated. There is no specific ST wave changes appreciated. No STEMI    Prior EKG to compare with was available and no clinically significant change no morphology compared to prior from 3/14/2022.     MD Katy Le MD  08/18/22 [id number]

## 2022-08-18 NOTE — ED PROVIDER NOTES
EMERGENCY DEPARTMENT ENCOUNTER        PCP: Jacky Magallanes MD    [address] [facility]    Chief Complaint   Patient presents with    Chest Pain     Since this am        This patient was not evaluated by the attending physician. I have independently evaluated this patient. HPI    Alon Marquez is a 40 y.o. female who presents with substernal chest pain. Onset yesterday. Patient states she was at an event and was feeling stressed when she started having chest pain. Patient has associated lightheadedness. Patient felt short of breath. Patient states she woke up today and had continued chest pain. Patient denies abdominal pain, vomiting, diarrhea, urinary symptoms. Patient denies pregnancy. Patient denies history of blood clots, recent travel or surgery. Patient denies history of heart disease. Patient denies hypertension, hyperlipidemia or diabetes. Patient has family history of heart disease. Patient denies cough or fever. Patient states she took a home COVID test which was negative. REVIEW OF SYSTEMS    Constitutional:  Denies fever  HENT:  Denies sore throat or ear pain   Cardiovascular:  See HPI. Denies syncope   Respiratory:    See HPI. GI:  Denies abdominal pain, nausea, vomiting, or diarrhea  :  Denies any urinary symptoms   Musculoskeletal:   Denies back pain  Skin:  Denies rash  Neurologic:  Denies focal weakness or sensory changes   Lymphatic:  Denies swollen glands     All other review of systems are negative  See HPI and nursing notes for additional information     PAST MEDICAL & SURGICAL HISTORY    Past Medical History:   Diagnosis Date    Anxiety disorder     Arthritis     Asthma     Follows with Lizbeth    Bipolar disorder ([facility] [address]     Not on meds as of 12/3/19    Depression     currently not on meds (12/3/19)    Drug addiction ([facility] [address]     states previous addiction to vicodin (2011), denies current use.    Hx + UDS for cocaine, 12/3/2019    Family history of heart disease     \"had to see Dr Roque Sterling and he said every thing ok- approx 2 yrs ago \"    H/O echocardiogram 2018    Technically difficult examination due to body habitus. Left ventricular function is low normal, EF is estimated at 45-50%. Mild concentric left ventricular hypertrophy. Mildly dilated left atrium. Right ventricular systolic pressure of 29 mm Hg. Mild tricuspid regurgitation, no evidence of any pericardial effusion. DVBRAPFU(102.4)     Last headache:  19    History of exercise stress test 2017    treadmill    Miscarriage     x4    Pain management     Was Dr. Fadumo Pruitt - left his practice 2019    Prolonged emergence from general anesthesia     \"with last surgery had trouble keeping me awake and ended up admitted me- and oxygen level kept going down    Sleep apnea     sleep study - has cpap does not wear daily    Wears glasses      Past Surgical History:   Procedure Laterality Date    CARPAL TUNNEL RELEASE Left 10/2019    Dr. Snow Failing Right 2016     SECTION       &     [address] OF UTERUS       &     HERNIA REPAIR N/A 2020    HERNIA SUPRAUMBILICAL/ VENTRAL REPAIR LAPAROSCOPIC ROBOTIC WITH MESH performed by Izaiah Carlton MD at [facility]. ARTHROSCOPY Right 2019    Dr. David Flannery      Several on bottom & top - no partials    TUBAL LIGATION      UPPER GASTROINTESTINAL ENDOSCOPY N/A 10/5/2020    EGD BIOPSY performed by Izaiah Carlton MD at [address] [place] [address]    Current Outpatient Rx   Medication Sig Dispense Refill    butalbital-acetaminophen-caffeine (FIORICET, ESGIC) -40 MG per tablet Take 1 tablet by mouth every 4 hours as needed for Headaches 180 tablet 3    atorvastatin (LIPITOR) 40 MG tablet Take 1 tablet by mouth nightly 30 tablet 3    gabapentin (NEURONTIN) 600 MG tablet Take 600 mg by mouth in the morning, at noon, and at bedtime. HYDROcodone-acetaminophen (NORCO) 5-325 MG per tablet Take 5-325 tablets by mouth in the morning, at noon, and at bedtime. meloxicam (MOBIC) 15 MG tablet Take 15 mg by mouth daily as needed       naproxen (NAPROSYN) 500 MG tablet Take 1 tablet by mouth 2 times daily (with meals) (Patient not taking: Reported on 3/14/2022) 180 tablet 1    ibuprofen (ADVIL;MOTRIN) 600 MG tablet Take 1 tablet by mouth every 6 hours as needed for Pain 28 tablet 0    fluticasone-vilanterol (BREO ELLIPTA) 100-25 MCG/INH AEPB inhaler Inhale 1 puff into the lungs daily 1 each 3    albuterol sulfate  (90 Base) MCG/ACT inhaler Inhale 2 puffs into the lungs 4 times daily 1 Inhaler 5    Elastic Bandages & Supports (KNEE BRACE/CUSHION/L-XL) MISC Please dispense one knee brace to be worn for 8 hrs during the day and taken off at nighttime for right knee for comfort.  (Patient not taking: Reported on 3/14/2022) 1 each 0       ALLERGIES    Allergies   Allergen Reactions    Latex Hives    Shellfish-Derived Products      \"break out like tomato and swell - trouble swallowing- with shrimp    Ultram [Tramadol Hcl] Hives       SOCIAL & FAMILY HISTORY    Social History     Socioeconomic History    Marital status: Single     Spouse name: None    Number of children: None    Years of education: None    Highest education level: None   Tobacco Use    Smoking status: Every Day     Packs/day: 1.00     Years: 18.00     Pack years: 18.00     Types: Cigarettes     Start date: 2001    Smokeless tobacco: Never   Vaping Use    Vaping Use: Never used   Substance and Sexual Activity    Alcohol use: No    Drug use: Not Currently     Types: Marijuana Delona Members)     Comment: Last used: 2005/ per pt on 10/1/2020\"last used cocaine 2019    Sexual activity: Yes     Partners: Male     Family History   Problem Relation Age of Onset    Diabetes Mother     High Blood Pressure Mother     Heart Disease Mother     Heart Disease Maternal Grandmother     Heart Disease Maternal Grandfather        PHYSICAL EXAM    VITAL SIGNS: /71   Pulse 67   Temp 98.1 °F (36.7 °C) (Oral)   Resp 12   Ht 6' (1.829 m)   Wt (!) 380 lb (172.4 kg)   SpO2 99%   BMI 51.54 kg/m²    Constitutional:  Well developed, well nourished, no acute distress   HENT:  Atraumatic, moist mucus membranes  Neck/Lymphatics: supple, no JVD, no swollen nodes  Respiratory:  Lungs Clear, no retractions   Cardiovascular: Normal rate and rhythm, no obvious murmurs  Vascular: Radial pulses 2+ equal bilaterally  GI:  Soft, nontender, normal bowel sounds. Negative Ngo sign  Musculoskeletal:   No edema, no deformities  Integument:  Skin warm and dry, no petechiae   Neurologic:  Alert & oriented, normal speech  Psych: Pleasant affect, no hallucinations      EKG Interpretation  Please see ED physician's note for EKG interpretation        RADIOLOGY/PROCEDURES    XR CHEST (2 VW)   Final Result   No acute process.                LABS:  Results for orders placed or performed during the hospital encounter of 08/18/22   CBC with Auto Differential   Result Value Ref Range    WBC 10.8 (H) 4.0 - 10.5 K/CU MM    RBC 4.61 4.2 - 5.4 M/CU MM    Hemoglobin 13.6 12.5 - 16.0 GM/DL    Hematocrit 42.2 37 - 47 %    MCV 91.5 78 - 100 FL    MCH 29.5 27 - 31 PG    MCHC 32.2 32.0 - 36.0 %    RDW 12.7 11.7 - 14.9 %    Platelets 732 870 - 658 K/CU MM    MPV 9.6 7.5 - 11.1 FL    Differential Type AUTOMATED DIFFERENTIAL     Segs Relative 71.2 (H) 36 - 66 %    Lymphocytes % 19.9 (L) 24 - 44 %    Monocytes % 5.8 (H) 0 - 4 %    Eosinophils % 2.3 0 - 3 %    Basophils % 0.5 0 - 1 %    Segs Absolute 7.7 K/CU MM    Lymphocytes Absolute 2.2 K/CU MM    Monocytes Absolute 0.6 K/CU MM    Eosinophils Absolute 0.3 K/CU MM    Basophils Absolute 0.1 K/CU MM    Nucleated RBC % 0.0 %    Total Nucleated RBC 0.0 K/CU MM    Total Immature Neutrophil 0.03 K/CU MM    Immature Neutrophil % 0.3 0 - 0.43 %   Comprehensive Metabolic Panel w/ Reflex to MG   Result Value Ref Range Sodium 142 135 - 145 MMOL/L    Potassium 4.0 3.5 - 5.1 MMOL/L    Chloride 107 99 - 110 mMol/L    CO2 25 21 - 32 MMOL/L    BUN 11 6 - 23 MG/DL    Creatinine 0.6 0.6 - 1.1 MG/DL    Glucose 91 70 - 99 MG/DL    Calcium 8.9 8.3 - 10.6 MG/DL    Albumin 4.3 3.4 - 5.0 GM/DL    Total Protein 7.2 6.4 - 8.2 GM/DL    Total Bilirubin 0.3 0.0 - 1.0 MG/DL    ALT 17 10 - 40 U/L    AST 17 15 - 37 IU/L    Alkaline Phosphatase 69 40 - 129 IU/L    GFR Non-African American >60 >60 mL/min/1.73m2    GFR African American >60 >60 mL/min/1.73m2    Anion Gap 10 4 - 16   Troponin   Result Value Ref Range    Troponin T <0.010 <0.01 NG/ML   HCG Serum, Qualitative   Result Value Ref Range    hCG Qual NEGATIVE    EKG 12 Lead   Result Value Ref Range    Ventricular Rate 63 BPM    Atrial Rate 63 BPM    P-R Interval 174 ms    QRS Duration 86 ms    Q-T Interval 406 ms    QTc Calculation (Bazett) 415 ms    P Axis 13 degrees    R Axis 30 degrees    T Axis 68 degrees    Diagnosis       Normal sinus rhythm  Normal ECG  When compared with ECG of 11-MAR-2022 17:28,  No significant change was found             ED COURSE & MEDICAL DECISION MAKING    Patient presents as above. Patient is comfortably resting exam bed in no acute distress. See physician note for EKG reading. Chest x-ray shows no acute process. CBC and CMP grossly within normal limits. Troponin negative. Pregnancy negative. I discussed labs and imaging with patient today. Patient has a heart score of 2. Patient declines delta troponin. Patient is low risk Wells and PERC negative and I do not believe she has a PE causing chest pain. Patient's abdomen is soft, nontender I do not believe this is due to pancreatitis or cholecystitis. I recommend follow-up with primary care provider or cardiology in 2 days for recheck    Clinical  IMPRESSION    1. Chest pain, unspecified type          Diagnosis and plan discussed in detail with patient who understands and agrees.   Patient agrees to

## 2022-08-19 LAB
EKG ATRIAL RATE: 63 BPM
EKG DIAGNOSIS: NORMAL
EKG P AXIS: 13 DEGREES
EKG P-R INTERVAL: 174 MS
EKG Q-T INTERVAL: 406 MS
EKG QRS DURATION: 86 MS
EKG QTC CALCULATION (BAZETT): 415 MS
EKG R AXIS: 30 DEGREES
EKG T AXIS: 68 DEGREES
EKG VENTRICULAR RATE: 63 BPM

## 2022-08-19 PROCEDURE — 93010 ELECTROCARDIOGRAM REPORT: CPT | Performed by: INTERNAL MEDICINE

## 2022-09-20 ENCOUNTER — HOSPITAL ENCOUNTER (EMERGENCY)
Age: 37
Discharge: HOME | End: 2022-09-20
Payer: COMMERCIAL

## 2022-09-20 VITALS
DIASTOLIC BLOOD PRESSURE: 84 MMHG | BODY MASS INDEX: 39.68 KG/M2 | SYSTOLIC BLOOD PRESSURE: 143 MMHG | HEIGHT: 72 IN | WEIGHT: 293 LBS | OXYGEN SATURATION: 97 % | HEART RATE: 85 BPM | TEMPERATURE: 98.3 F | RESPIRATION RATE: 18 BRPM

## 2022-09-20 DIAGNOSIS — S01.81XA FACIAL LACERATION, INITIAL ENCOUNTER: Primary | ICD-10-CM

## 2022-09-20 PROCEDURE — 6370000000 HC RX 637 (ALT 250 FOR IP): Performed by: PHYSICIAN ASSISTANT

## 2022-09-20 PROCEDURE — 99283 EMERGENCY DEPT VISIT LOW MDM: CPT

## 2022-09-20 PROCEDURE — 12011 RPR F/E/E/N/L/M 2.5 CM/<: CPT

## 2022-09-20 RX ORDER — IBUPROFEN 600 MG/1
600 TABLET ORAL ONCE
Status: COMPLETED | OUTPATIENT
Start: 2022-09-20 | End: 2022-09-20

## 2022-09-20 RX ADMIN — IBUPROFEN 600 MG: 600 TABLET ORAL at 17:15

## 2022-09-20 ASSESSMENT — PAIN SCALES - GENERAL
PAINLEVEL_OUTOF10: 10
PAINLEVEL_OUTOF10: 10

## 2022-09-20 ASSESSMENT — PAIN DESCRIPTION - LOCATION
LOCATION: HEAD
LOCATION: HEAD

## 2022-09-20 ASSESSMENT — PAIN DESCRIPTION - PAIN TYPE: TYPE: ACUTE PAIN

## 2022-09-20 ASSESSMENT — PAIN DESCRIPTION - ORIENTATION: ORIENTATION: LEFT

## 2022-09-20 ASSESSMENT — PAIN DESCRIPTION - FREQUENCY: FREQUENCY: CONTINUOUS

## 2022-09-20 ASSESSMENT — PAIN DESCRIPTION - DESCRIPTORS: DESCRIPTORS: THROBBING;NUMBNESS
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
## 2022-09-20 NOTE — ED NOTES
Patient instructed to follow up with PCP. Verbalized understanding, no further questions at this time.      Amish Lopez RN  09/20/22 7071

## 2022-10-09 ENCOUNTER — HOSPITAL ENCOUNTER (EMERGENCY)
Age: 37
Discharge: HOME | End: 2022-10-09
Payer: COMMERCIAL

## 2022-10-09 VITALS
RESPIRATION RATE: 15 BRPM | SYSTOLIC BLOOD PRESSURE: 105 MMHG | BODY MASS INDEX: 39.68 KG/M2 | HEIGHT: 72 IN | WEIGHT: 293 LBS | DIASTOLIC BLOOD PRESSURE: 66 MMHG | OXYGEN SATURATION: 97 % | TEMPERATURE: 97.8 F | HEART RATE: 80 BPM

## 2022-10-09 DIAGNOSIS — R11.2 NAUSEA, VOMITING AND DIARRHEA: Primary | ICD-10-CM

## 2022-10-09 DIAGNOSIS — R19.7 NAUSEA, VOMITING AND DIARRHEA: Primary | ICD-10-CM

## 2022-10-09 PROCEDURE — 6370000000 HC RX 637 (ALT 250 FOR IP): Performed by: PHYSICIAN ASSISTANT

## 2022-10-09 PROCEDURE — 99283 EMERGENCY DEPT VISIT LOW MDM: CPT

## 2022-10-09 RX ORDER — ONDANSETRON 4 MG/1
4 TABLET, FILM COATED ORAL EVERY 8 HOURS PRN
Qty: 10 TABLET | Refills: 0 | Status: SHIPPED | OUTPATIENT
Start: 2022-10-09

## 2022-10-09 RX ORDER — LOPERAMIDE HYDROCHLORIDE 2 MG/1
4 CAPSULE ORAL ONCE
Status: COMPLETED | OUTPATIENT
Start: 2022-10-09 | End: 2022-10-09

## 2022-10-09 RX ORDER — ONDANSETRON 4 MG/1
4 TABLET, ORALLY DISINTEGRATING ORAL ONCE
Status: COMPLETED | OUTPATIENT
Start: 2022-10-09 | End: 2022-10-09

## 2022-10-09 RX ORDER — LOPERAMIDE HYDROCHLORIDE 2 MG/1
2 CAPSULE ORAL 4 TIMES DAILY PRN
Qty: 20 CAPSULE | Refills: 0 | Status: SHIPPED | OUTPATIENT
Start: 2022-10-09 | End: 2022-10-19

## 2022-10-09 RX ADMIN — ONDANSETRON 4 MG: 4 TABLET, ORALLY DISINTEGRATING ORAL at 19:30

## 2022-10-09 RX ADMIN — LOPERAMIDE HYDROCHLORIDE 4 MG: 2 CAPSULE ORAL at 19:30

## 2022-10-09 NOTE — ED PROVIDER NOTES
Triage Chief Complaint:   No chief complaint on file. [place]:  Today in the ED I had the pleasure of caring for Hossein Mendoza who is a 40 y.o. female that presents today to the emergency department complaining of nausea vomiting diarrhea been ongoing over the last several days. No associated abdominal pain. No flank pain no urinary symptoms. No chest pain or shortness of breath no constitutional symptoms such as fevers or chills. She states she feels baseline otherwise. Has not been to tolerate p.o. Since yesterday. She did eat and keep down a full meal.  Has been able to tolerate liquids however. Without complication. ROS:  REVIEW OF SYSTEMS    At least 10 systems reviewed      All other review of systems are negative  See HPI and nursing notes for additional information       Past Medical History:   Diagnosis Date    Anxiety disorder     Arthritis     Asthma     Follows with Lizbeth    Bipolar disorder ([facility] [address]     Not on meds as of 12/3/19    Depression     currently not on meds (12/3/19)    Drug addiction ([facility] [address]     states previous addiction to vicodin (2011), denies current use. Hx + UDS for cocaine, 12/3/2019    Family history of heart disease     \"had to see Dr Sal Noriega and he said every thing ok- approx 2 yrs ago \"    H/O echocardiogram 12/19/2018    Technically difficult examination due to body habitus. Left ventricular function is low normal, EF is estimated at 45-50%. Mild concentric left ventricular hypertrophy. Mildly dilated left atrium. Right ventricular systolic pressure of 29 mm Hg. Mild tricuspid regurgitation, no evidence of any pericardial effusion.      PYVISQGH(545.3)     Last headache:  12/2/19    History of exercise stress test 05/04/2017    treadmill    Miscarriage     x4    Pain management     Was Dr. Karlene Wood - left his practice 11/2019    Prolonged emergence from general anesthesia     \"with last surgery had trouble keeping me awake and ended up admitted me- and oxygen level kept going down    Sleep apnea     sleep study 2019- has cpap does not wear daily    Wears glasses      Past Surgical History:   Procedure Laterality Date    CARPAL TUNNEL RELEASE Left 10/2019    Dr. Marielena Milligan Right 2016     SECTION       & 2014    [address] OF UTERUS       &     HERNIA REPAIR N/A 2020    HERNIA SUPRAUMBILICAL/ VENTRAL REPAIR LAPAROSCOPIC ROBOTIC WITH MESH performed by Rama Farley MD at [place] [address]. ARTHROSCOPY Right 2019    Dr. Ying Booth      Several on bottom & top - no partials    TUBAL LIGATION      UPPER GASTROINTESTINAL ENDOSCOPY N/A 10/5/2020    EGD BIOPSY performed by Rama Farley MD at [facility] ENDOSCOPY     Family History   Problem Relation Age of Onset    Diabetes Mother     High Blood Pressure Mother     Heart Disease Mother     Heart Disease Maternal Grandmother     Heart Disease Maternal Grandfather      Social History     Socioeconomic History    Marital status: Single     Spouse name: Not on file    Number of children: Not on file    Years of education: Not on file    Highest education level: Not on file   Occupational History    Not on file   Tobacco Use    Smoking status: Every Day     Packs/day: 1.00     Years: 18.00     Pack years: 18.00     Types: Cigarettes     Start date:     Smokeless tobacco: Never   Vaping Use    Vaping Use: Never used   Substance and Sexual Activity    Alcohol use: No    Drug use: Not Currently     Types: Marijuana Shellye Burkitt)     Comment: Last used: 2005/ per pt on 10/1/2020\"last used cocaine     Sexual activity: Yes     Partners: Male   Other Topics Concern    Not on file   Social History Narrative    Not on file     Social Determinants of Health     Financial Resource Strain: Not on file   Food Insecurity: Not on file   Transportation Needs: Not on file   Physical Activity: Not on file   Stress: Not on file   Social Connections: Not on file   Intimate Partner Violence: Not on file   Housing Stability: Not on file     Current Facility-Administered Medications   Medication Dose Route Frequency Provider Last Rate Last Admin    ondansetron (ZOFRAN-ODT) disintegrating tablet 4 mg  4 mg Oral Once [employer], PA-C        loperamide (IMODIUM) capsule 4 mg  4 mg Oral Once [employer], PA-C         Current Outpatient Medications   Medication Sig Dispense Refill    ondansetron (ZOFRAN) 4 MG tablet Take 1 tablet by mouth every 8 hours as needed for Nausea 10 tablet 0    loperamide (RA ANTI-DIARRHEAL) 2 MG capsule Take 1 capsule by mouth 4 times daily as needed for Diarrhea 20 capsule 0    butalbital-acetaminophen-caffeine (FIORICET, ESGIC) -40 MG per tablet Take 1 tablet by mouth every 4 hours as needed for Headaches 180 tablet 3    atorvastatin (LIPITOR) 40 MG tablet Take 1 tablet by mouth nightly 30 tablet 3    gabapentin (NEURONTIN) 600 MG tablet Take 600 mg by mouth in the morning, at noon, and at bedtime. HYDROcodone-acetaminophen (NORCO) 5-325 MG per tablet Take 5-325 tablets by mouth in the morning, at noon, and at bedtime. meloxicam (MOBIC) 15 MG tablet Take 15 mg by mouth daily as needed       naproxen (NAPROSYN) 500 MG tablet Take 1 tablet by mouth 2 times daily (with meals) (Patient not taking: Reported on 3/14/2022) 180 tablet 1    ibuprofen (ADVIL;MOTRIN) 600 MG tablet Take 1 tablet by mouth every 6 hours as needed for Pain 28 tablet 0    fluticasone-vilanterol (BREO ELLIPTA) 100-25 MCG/INH AEPB inhaler Inhale 1 puff into the lungs daily 1 each 3    albuterol sulfate  (90 Base) MCG/ACT inhaler Inhale 2 puffs into the lungs 4 times daily 1 Inhaler 5    Elastic Bandages & Supports (KNEE BRACE/CUSHION/L-XL) MISC Please dispense one knee brace to be worn for 8 hrs during the day and taken off at nighttime for right knee for comfort.  (Patient not taking: Reported on 3/14/2022) 1 each 0     Allergies Allergen Reactions    Latex Hives    Shellfish-Derived Products      \"break out like tomato and swell - trouble swallowing- with shrimp    Ultram [Tramadol Hcl] Hives       Nursing Notes Reviewed    Physical Exam:  ED Triage Vitals [10/09/22 1841]   Enc Vitals Group      /66      Heart Rate 80      Resp 15      Temp 97.8 °F (36.6 °C)      Temp Source Oral      SpO2 97 %      Weight (!) 380 lb (172.4 kg)      Height 6' (1.829 m)      Head Circumference       Peak Flow       Pain Score       Pain Loc       Pain Edu? Excl. in [address]? General :Patient is awake alert oriented person place and time no acute distress nontoxic appearing  HEENT: Pupils are equally round and reactive to light extraocular motors are intact conjunctivae clear sclerae white there is no injection no icterus. Nose without any rhinorrhea or epistaxis. Oral mucosa is moist no exudate buccal mucosa shows no ulcerations. Uvula is midline    Neck: Neck is supple full range of motion trachea midline thyroid nonpalpable  Cardiac: Heart regular rate rhythm no murmurs rubs clicks or gallops  Lungs: Lungs are clear to auscultation there is no wheezing rhonchi or rales. There is no use of accessory muscles no nasal flaring identified. Chest wall: There is no tenderness to palpation over the chest wall or over ribs  Abdomen: Abdomen is soft nontender nondistended. There is no firm or pulsatile masses no rebound rigidity or guarding negative Ngo's negative McBurney, no peritoneal signs  Suprapubic:  there is no tenderness to palpation over the external bladder   Musculoskeletal: 5 out of 5 strength in all 4 extremities full flexion extension abduction and adduction supination pronation of all extremities and all digits. No obvious muscle atrophy is noted.  No focal muscle deficits are appreciated  Dermatology: Skin is warm and dry there is no obvious abscesses lacerations or lesions noted  Psych: Mentation is grossly normal cognition is grossly normal. Affect is appropriate  Neuro: Motor intact sensory intact cranial nerves II through XII are intact level of consciousness is normal cerebellar function is normal reflexes are grossly normal. No evidence of incontinence or loss of bowel or bladder no saddle anesthesia noted Lymphatic: There is no submandibular or cervical adenopathy appreciated. I have reviewed and interpreted all of the currently available lab results from this visit (if applicable):  No results found for this visit on 10/09/22. Radiographs (if obtained):  [] The following radiograph was interpreted by myself in the absence of a radiologist:   [] Radiologist's Report Reviewed:  No orders to display       EKG (if obtained):   Please See Note of attending physician for EKG interpretation. Chart review shows recent radiograph(s):  No results found. MDM:     Interventions given this visit:   Orders Placed This Encounter   Medications    ondansetron (ZOFRAN) 4 MG tablet     Sig: Take 1 tablet by mouth every 8 hours as needed for Nausea     Dispense:  10 tablet     Refill:  0    loperamide (RA ANTI-DIARRHEAL) 2 MG capsule     Sig: Take 1 capsule by mouth 4 times daily as needed for Diarrhea     Dispense:  20 capsule     Refill:  0    ondansetron (ZOFRAN-ODT) disintegrating tablet 4 mg    loperamide (IMODIUM) capsule 4 mg       0 abdominal pain 0 urinary symptoms patient presents today to the emergency department with nausea vomiting diarrhea. Likely food poisoning in etiology. Belly exam is benign on initial repeat examinations. She is provided with emetics antidiarrheals. Antiemetics were very effective as patient is tolerating p.o. here in the emergency department will provide prescription for the same. I have low sufficient of acute emergent intra-abdominal processes. I independently managed patient today in the ED.      /66   Pulse 80   Temp 97.8 °F (36.6 °C) (Oral)   Resp 15   Ht 6' (1.829 m)   [employer] (!) 380 lb (172.4 kg)   SpO2 97%   BMI 51.54 kg/m²       Clinical Impression:  1. Nausea, vomiting and diarrhea        Disposition referral (if applicable):  Salbador Holly MD  [address]  [id number]  [place] [address]  [phone]          [facility] Emergency Department  [address] [phone]    If symptoms worsen or persist    Disposition medications (if applicable):  New Prescriptions    LOPERAMIDE (RA ANTI-DIARRHEAL) 2 MG CAPSULE    Take 1 capsule by mouth 4 times daily as needed for Diarrhea    ONDANSETRON (ZOFRAN) 4 MG TABLET    Take 1 tablet by mouth every 8 hours as needed for Nausea         Comment: Please note this report has been produced using speech recognition software and may contain errors related to that system including errors in grammar, punctuation, and spelling, as well as words and phrases that may be inappropriate. If there are any questions or concerns please feel free to contact the dictating provider for clarification.       TAHMINA García [place]  10/15/22 2009

## 2022-10-09 NOTE — ACP (ADVANCE CARE PLANNING)
Patient does not have any ACP documents/Medical Power of . LSW notes hospital will follow [place]'s Next of Kin hierarchy in the following descending order for priority:    Guardian  Spouse  [de-identified] of adult Children  Parents  [de-identified] of adult Siblings  Nearest Relative not described above    Per [place] Next of Kin hierarchy: Patients' Parent will be [address].

## 2023-05-09 ENCOUNTER — HOSPITAL ENCOUNTER (EMERGENCY)
Age: 38
Discharge: HOME | End: 2023-05-10
Attending: EMERGENCY MEDICINE
Payer: COMMERCIAL

## 2023-05-09 DIAGNOSIS — J02.9 ACUTE PHARYNGITIS, UNSPECIFIED ETIOLOGY: Primary | ICD-10-CM

## 2023-05-09 DIAGNOSIS — R51.9 ACUTE NONINTRACTABLE HEADACHE, UNSPECIFIED HEADACHE TYPE: ICD-10-CM

## 2023-05-09 PROCEDURE — 87081 CULTURE SCREEN ONLY: CPT

## 2023-05-09 PROCEDURE — 99284 EMERGENCY DEPT VISIT MOD MDM: CPT

## 2023-05-09 PROCEDURE — 87430 STREP A AG IA: CPT

## 2023-05-09 PROCEDURE — 6360000002 HC RX W HCPCS: Performed by: EMERGENCY MEDICINE

## 2023-05-09 PROCEDURE — 96374 THER/PROPH/DIAG INJ IV PUSH: CPT

## 2023-05-09 PROCEDURE — 96375 TX/PRO/DX INJ NEW DRUG ADDON: CPT

## 2023-05-09 RX ORDER — KETOROLAC TROMETHAMINE 30 MG/ML
30 INJECTION, SOLUTION INTRAMUSCULAR; INTRAVENOUS ONCE
Status: COMPLETED | OUTPATIENT
Start: 2023-05-09 | End: 2023-05-09

## 2023-05-09 RX ORDER — DIPHENHYDRAMINE HYDROCHLORIDE 50 MG/ML
25 INJECTION INTRAMUSCULAR; INTRAVENOUS ONCE
Status: COMPLETED | OUTPATIENT
Start: 2023-05-09 | End: 2023-05-09

## 2023-05-09 RX ORDER — METOCLOPRAMIDE HYDROCHLORIDE 5 MG/ML
10 INJECTION INTRAMUSCULAR; INTRAVENOUS ONCE
Status: COMPLETED | OUTPATIENT
Start: 2023-05-09 | End: 2023-05-09

## 2023-05-09 RX ADMIN — METOCLOPRAMIDE 10 MG: 5 INJECTION, SOLUTION INTRAMUSCULAR; INTRAVENOUS at 23:04

## 2023-05-09 RX ADMIN — KETOROLAC TROMETHAMINE 30 MG: 30 INJECTION, SOLUTION INTRAMUSCULAR at 23:00

## 2023-05-09 RX ADMIN — DIPHENHYDRAMINE HYDROCHLORIDE 25 MG: 50 INJECTION, SOLUTION INTRAMUSCULAR; INTRAVENOUS at 23:03

## 2023-05-09 ASSESSMENT — PAIN SCALES - GENERAL: PAINLEVEL_OUTOF10: 10

## 2023-05-09 ASSESSMENT — PAIN DESCRIPTION - DESCRIPTORS: DESCRIPTORS: DISCOMFORT

## 2023-05-09 ASSESSMENT — PAIN DESCRIPTION - LOCATION: LOCATION: HEAD

## 2023-05-10 VITALS
OXYGEN SATURATION: 98 % | RESPIRATION RATE: 18 BRPM | DIASTOLIC BLOOD PRESSURE: 49 MMHG | SYSTOLIC BLOOD PRESSURE: 102 MMHG | TEMPERATURE: 98 F | HEART RATE: 68 BPM

## 2023-05-10 ASSESSMENT — PAIN DESCRIPTION - LOCATION: LOCATION: HEAD

## 2023-05-10 ASSESSMENT — PAIN DESCRIPTION - DESCRIPTORS: DESCRIPTORS: ACHING

## 2023-05-10 ASSESSMENT — PAIN - FUNCTIONAL ASSESSMENT: PAIN_FUNCTIONAL_ASSESSMENT: ACTIVITIES ARE NOT PREVENTED

## 2023-05-10 ASSESSMENT — PAIN SCALES - GENERAL: PAINLEVEL_OUTOF10: 3

## 2023-05-10 NOTE — ED NOTES
Discharge instructions reviewed with patient and all questions addressed. Patient alert and oriented x4 at time of discharge. Patient ambulatory and steady gait.   IV removed and dressing applied      Deisy Roth RN  05/10/23 7145

## 2023-05-10 NOTE — ED PROVIDER NOTES
Triage Chief Complaint:   Migraine    [place]:  Cb Jj is a 40 y.o. female that presents with 1 day of gradually worsening holoacranial throbbing headache associated with nausea and sore throat. Denies any cough, congestion, difficulty breathing or abdominal pain. She has not had vomiting or diarrhea. Patient does have history of migraines but has not had improvement at home with Fioricet, Tylenol and ibuprofen. No reported fevers. ROS:  At least 10 systems reviewed and otherwise acutely negative except as in the [address] Past Medical History:   Diagnosis Date    Anxiety disorder     Arthritis     Asthma     Follows with Lizbeth    Bipolar disorder ([facility] [address]     Not on meds as of 12/3/19    Depression     currently not on meds (12/3/19)    Drug addiction ([facility] [address]     states previous addiction to vicodin (2011), denies current use. Hx + UDS for cocaine, 12/3/2019    Family history of heart disease     \"had to see Dr Linn Corona and he said every thing ok- approx 2 yrs ago \"    H/O echocardiogram 12/19/2018    Technically difficult examination due to body habitus. Left ventricular function is low normal, EF is estimated at 45-50%. Mild concentric left ventricular hypertrophy. Mildly dilated left atrium. Right ventricular systolic pressure of 29 mm Hg. Mild tricuspid regurgitation, no evidence of any pericardial effusion.      [id number](904.3)     Last headache:  12/2/19    History of exercise stress test 05/04/2017    treadmill    Miscarriage     x4    Pain management     Was Dr. Madhuri Roberson - left his practice 11/2019    Prolonged emergence from general anesthesia     \"with last surgery had trouble keeping me awake and ended up admitted me- and oxygen level kept going down    Sleep apnea     sleep study 2019- has cpap does not wear daily    Wears glasses      Past Surgical History:   Procedure Laterality Date    CARPAL TUNNEL RELEASE Left 10/2019    Dr. Kay Lopez Right 2016    Jessy Small

## 2023-05-12 LAB
CULTURE: NORMAL
Lab: NORMAL
SPECIMEN: NORMAL
STREP A DIRECT SCREEN: NEGATIVE

## 2023-05-20 ENCOUNTER — APPOINTMENT (OUTPATIENT)
Dept: GENERAL RADIOLOGY | Age: 38
End: 2023-05-20
Payer: COMMERCIAL

## 2023-05-20 ENCOUNTER — APPOINTMENT (OUTPATIENT)
Dept: ULTRASOUND IMAGING | Age: 38
End: 2023-05-20
Payer: COMMERCIAL

## 2023-05-20 ENCOUNTER — HOSPITAL ENCOUNTER (EMERGENCY)
Age: 38
Discharge: HOME | End: 2023-05-20
Attending: EMERGENCY MEDICINE
Payer: COMMERCIAL

## 2023-05-20 VITALS
OXYGEN SATURATION: 94 % | WEIGHT: 293 LBS | BODY MASS INDEX: 52.22 KG/M2 | DIASTOLIC BLOOD PRESSURE: 65 MMHG | SYSTOLIC BLOOD PRESSURE: 123 MMHG | HEART RATE: 96 BPM | RESPIRATION RATE: 18 BRPM | TEMPERATURE: 97.8 F

## 2023-05-20 DIAGNOSIS — M62.838 SPASM OF MUSCLE: Primary | ICD-10-CM

## 2023-05-20 DIAGNOSIS — M79.89 LEG SWELLING: ICD-10-CM

## 2023-05-20 LAB
ALBUMIN SERPL-MCNC: 4.1 GM/DL (ref 3.4–5)
ALP BLD-CCNC: 71 IU/L (ref 40–128)
ALT SERPL-CCNC: 19 U/L (ref 10–40)
ANION GAP SERPL CALCULATED.3IONS-SCNC: 10 MMOL/L (ref 4–16)
AST SERPL-CCNC: 17 IU/L (ref 15–37)
BASOPHILS ABSOLUTE: 0 K/CU MM
BASOPHILS RELATIVE PERCENT: 0.4 % (ref 0–1)
BILIRUB SERPL-MCNC: 0.3 MG/DL (ref 0–1)
BUN SERPL-MCNC: 13 MG/DL (ref 6–23)
CALCIUM SERPL-MCNC: 8.9 MG/DL (ref 8.3–10.6)
CHLORIDE BLD-SCNC: 103 MMOL/L (ref 99–110)
CO2: 27 MMOL/L (ref 21–32)
CREAT SERPL-MCNC: 0.8 MG/DL (ref 0.6–1.1)
DIFFERENTIAL TYPE: ABNORMAL
EKG ATRIAL RATE: 71 BPM
EKG DIAGNOSIS: NORMAL
EKG P AXIS: 23 DEGREES
EKG P-R INTERVAL: 188 MS
EKG Q-T INTERVAL: 390 MS
EKG QRS DURATION: 90 MS
EKG QTC CALCULATION (BAZETT): 423 MS
EKG R AXIS: 35 DEGREES
EKG T AXIS: 54 DEGREES
EKG VENTRICULAR RATE: 71 BPM
EOSINOPHILS ABSOLUTE: 0.5 K/CU MM
EOSINOPHILS RELATIVE PERCENT: 4.9 % (ref 0–3)
GFR SERPL CREATININE-BSD FRML MDRD: >60 ML/MIN/1.73M2
GLUCOSE SERPL-MCNC: 127 MG/DL (ref 70–99)
GONADOTROPIN, CHORIONIC (HCG) QUANT: <1 UIU/ML
HCT VFR BLD CALC: 43.2 % (ref 37–47)
HEMOGLOBIN: 13.7 GM/DL (ref 12.5–16)
IMMATURE NEUTROPHIL %: 0.3 % (ref 0–0.43)
LACTATE: 1.5 MMOL/L (ref 0.5–1.9)
LYMPHOCYTES ABSOLUTE: 2.3 K/CU MM
LYMPHOCYTES RELATIVE PERCENT: 21.7 % (ref 24–44)
MAGNESIUM: 2.2 MG/DL (ref 1.8–2.4)
MCH RBC QN AUTO: 29.7 PG (ref 27–31)
MCHC RBC AUTO-ENTMCNC: 31.7 % (ref 32–36)
MCV RBC AUTO: 93.7 FL (ref 78–100)
MONOCYTES ABSOLUTE: 0.5 K/CU MM
MONOCYTES RELATIVE PERCENT: 4.8 % (ref 0–4)
NUCLEATED RBC %: 0 %
PDW BLD-RTO: 13 % (ref 11.7–14.9)
PLATELET # BLD: 270 K/CU MM (ref 140–440)
PMV BLD AUTO: 9.9 FL (ref 7.5–11.1)
POTASSIUM SERPL-SCNC: 3.8 MMOL/L (ref 3.5–5.1)
PRO-BNP: <5 PG/ML
RBC # BLD: 4.61 M/CU MM (ref 4.2–5.4)
SEGMENTED NEUTROPHILS ABSOLUTE COUNT: 7.2 K/CU MM
SEGMENTED NEUTROPHILS RELATIVE PERCENT: 67.9 % (ref 36–66)
SODIUM BLD-SCNC: 140 MMOL/L (ref 135–145)
TOTAL CK: 82 IU/L (ref 26–140)
TOTAL IMMATURE NEUTOROPHIL: 0.03 K/CU MM
TOTAL NUCLEATED RBC: 0 K/CU MM
TOTAL PROTEIN: 7 GM/DL (ref 6.4–8.2)
TROPONIN T: <0.01 NG/ML
TSH SERPL DL<=0.005 MIU/L-ACNC: 2.38 UIU/ML (ref 0.27–4.2)
WBC # BLD: 10.5 K/CU MM (ref 4–10.5)

## 2023-05-20 PROCEDURE — 85025 COMPLETE CBC W/AUTO DIFF WBC: CPT

## 2023-05-20 PROCEDURE — 84484 ASSAY OF TROPONIN QUANT: CPT

## 2023-05-20 PROCEDURE — 93005 ELECTROCARDIOGRAM TRACING: CPT | Performed by: EMERGENCY MEDICINE

## 2023-05-20 PROCEDURE — 93970 EXTREMITY STUDY: CPT

## 2023-05-20 PROCEDURE — 83605 ASSAY OF LACTIC ACID: CPT

## 2023-05-20 PROCEDURE — 99285 EMERGENCY DEPT VISIT HI MDM: CPT

## 2023-05-20 PROCEDURE — 83735 ASSAY OF MAGNESIUM: CPT

## 2023-05-20 PROCEDURE — 71045 X-RAY EXAM CHEST 1 VIEW: CPT

## 2023-05-20 PROCEDURE — 6370000000 HC RX 637 (ALT 250 FOR IP): Performed by: EMERGENCY MEDICINE

## 2023-05-20 PROCEDURE — 84443 ASSAY THYROID STIM HORMONE: CPT

## 2023-05-20 PROCEDURE — 84702 CHORIONIC GONADOTROPIN TEST: CPT

## 2023-05-20 PROCEDURE — 80053 COMPREHEN METABOLIC PANEL: CPT

## 2023-05-20 PROCEDURE — 82550 ASSAY OF CK (CPK): CPT

## 2023-05-20 PROCEDURE — 83880 ASSAY OF NATRIURETIC PEPTIDE: CPT

## 2023-05-20 RX ORDER — LIDOCAINE 4 G/G
1 PATCH TOPICAL DAILY
Status: DISCONTINUED | OUTPATIENT
Start: 2023-05-20 | End: 2023-05-20 | Stop reason: HOSPADM

## 2023-05-20 RX ORDER — ACETAMINOPHEN 500 MG
1000 TABLET ORAL ONCE
Status: COMPLETED | OUTPATIENT
Start: 2023-05-20 | End: 2023-05-20

## 2023-05-20 RX ORDER — CYCLOBENZAPRINE HCL 10 MG
10 TABLET ORAL ONCE
Status: COMPLETED | OUTPATIENT
Start: 2023-05-20 | End: 2023-05-20

## 2023-05-20 RX ADMIN — CYCLOBENZAPRINE 10 MG: 10 TABLET, FILM COATED ORAL at 05:22

## 2023-05-20 RX ADMIN — ACETAMINOPHEN 1000 MG: 500 TABLET ORAL at 05:22

## 2023-05-20 ASSESSMENT — ENCOUNTER SYMPTOMS
EYES NEGATIVE: 1
GASTROINTESTINAL NEGATIVE: 1
ALLERGIC/IMMUNOLOGIC NEGATIVE: 1
RESPIRATORY NEGATIVE: 1

## 2023-05-20 NOTE — ED TRIAGE NOTES
Pt states she started having muscle spasms starting @ 8a yesterday, then started having a HA. Pt states she now feels swollen.

## 2023-05-20 NOTE — ED PROVIDER NOTES
significant change was found          Radiographs (if obtained):  [] The following radiograph was interpreted by myself in the absence of a radiologist:  [x] Radiologist's Report Reviewed:    CXR, VL duplex legs    EKG (if obtained): (All EKG's are interpreted by myself in the absence of a cardiologist)      12 lead EKG per my interpretation:  Normal Sinus Rhythm 71  Axis is   Normal  QTc is   423  There is no specific T wave changes appreciated. There is no specific ST wave changes appreciated. Prior EKG to compare with was not available     MDM:    Patient here with complaint of right arm spasms as well as bilateral lower extremity swelling. Again patient states symptoms since yesterday has had constant right arm spasms and lower extremity swelling bilaterally. Denies any history of this before. No fevers chest pain shortness of breath nausea vomiting diarrhea abdominal pain denies being pregnant. No history of other issues. On arrival she appears well she is obese but she is in no distress, she has no weakness numbness or tingling compartments are soft. Does have some swelling slightly to both lower extremities. 2+ pulses throughout upper and lower extremities, again compartments are soft no signs of infection or trauma. No signs of erythema. Her right arm has full range of motion, no pain on palpation, I do not see any signs again of trauma or infection. Breath sounds are clear abdomen soft nontender neuro exam otherwise normal.  She has no neck pain again no trauma. She is right-handed. Patient had work-up performed including labs ultrasound of her legs. So far labs unremarkable. No signs of rhabdomyolysis with lactic acidosis or electrolyte abnormality. Ultrasounds negative for DVT.   No signs of heart failure except for chest x-ray shows congestion of the chest.  Patient rechecked she is doing well currently sleeping no distress I do not see any fasciculations or spasm on exam I do not think

## 2023-05-23 LAB
EKG ATRIAL RATE: 71 BPM
EKG DIAGNOSIS: NORMAL
EKG P AXIS: 23 DEGREES
EKG P-R INTERVAL: 188 MS
EKG Q-T INTERVAL: 390 MS
EKG QRS DURATION: 90 MS
EKG QTC CALCULATION (BAZETT): 423 MS
EKG R AXIS: 35 DEGREES
EKG T AXIS: 54 DEGREES
EKG VENTRICULAR RATE: 71 BPM

## 2023-05-23 PROCEDURE — 93010 ELECTROCARDIOGRAM REPORT: CPT | Performed by: INTERNAL MEDICINE

## 2023-07-13 ENCOUNTER — INITIAL CONSULT (OUTPATIENT)
Dept: OBGYN | Age: 38
End: 2023-07-13

## 2023-07-13 ENCOUNTER — HOSPITAL ENCOUNTER (OUTPATIENT)
Age: 38
Setting detail: SPECIMEN
Discharge: HOME | End: 2023-07-13

## 2023-07-13 ENCOUNTER — TELEPHONE (OUTPATIENT)
Dept: OBGYN | Age: 38
End: 2023-07-13

## 2023-07-13 VITALS
HEIGHT: 72 IN | SYSTOLIC BLOOD PRESSURE: 139 MMHG | BODY MASS INDEX: 39.68 KG/M2 | HEART RATE: 86 BPM | DIASTOLIC BLOOD PRESSURE: 2 MMHG | WEIGHT: 293 LBS

## 2023-07-13 DIAGNOSIS — N90.89 VULVAR SKIN TAG: Primary | ICD-10-CM

## 2023-07-13 DIAGNOSIS — N90.89 VULVAR LESION: ICD-10-CM

## 2023-07-13 RX ORDER — ACETAMINOPHEN 325 MG/1
650 TABLET ORAL EVERY 6 HOURS PRN
COMMUNITY

## 2023-07-13 RX ORDER — IBUPROFEN 800 MG/1
800 TABLET ORAL EVERY 6 HOURS PRN
COMMUNITY

## 2023-07-13 RX ORDER — HYDROCODONE BITARTRATE AND ACETAMINOPHEN 5; 325 MG/1; MG/1
1 TABLET ORAL EVERY 6 HOURS PRN
Qty: 12 TABLET | Refills: 0 | Status: SHIPPED | OUTPATIENT
Start: 2023-07-13 | End: 2023-07-16

## 2023-07-13 SDOH — ECONOMIC STABILITY: HOUSING INSECURITY
IN THE LAST 12 MONTHS, WAS THERE A TIME WHEN YOU DID NOT HAVE A STEADY PLACE TO SLEEP OR SLEPT IN A SHELTER (INCLUDING NOW)?: NO

## 2023-07-13 SDOH — ECONOMIC STABILITY: INCOME INSECURITY: HOW HARD IS IT FOR YOU TO PAY FOR THE VERY BASICS LIKE FOOD, HOUSING, MEDICAL CARE, AND HEATING?: NOT HARD AT ALL

## 2023-07-13 SDOH — ECONOMIC STABILITY: FOOD INSECURITY: WITHIN THE PAST 12 MONTHS, THE FOOD YOU BOUGHT JUST DIDN'T LAST AND YOU DIDN'T HAVE MONEY TO GET MORE.: NEVER TRUE

## 2023-07-13 SDOH — ECONOMIC STABILITY: FOOD INSECURITY: WITHIN THE PAST 12 MONTHS, YOU WORRIED THAT YOUR FOOD WOULD RUN OUT BEFORE YOU GOT MONEY TO BUY MORE.: NEVER TRUE

## 2023-07-13 ASSESSMENT — PATIENT HEALTH QUESTIONNAIRE - PHQ9
6. FEELING BAD ABOUT YOURSELF - OR THAT YOU ARE A FAILURE OR HAVE LET YOURSELF OR YOUR FAMILY DOWN: 0
SUM OF ALL RESPONSES TO PHQ QUESTIONS 1-9: 6
SUM OF ALL RESPONSES TO PHQ QUESTIONS 1-9: 6
2. FEELING DOWN, DEPRESSED OR HOPELESS: 0
3. TROUBLE FALLING OR STAYING ASLEEP: 1
9. THOUGHTS THAT YOU WOULD BE BETTER OFF DEAD, OR OF HURTING YOURSELF: 0
SUM OF ALL RESPONSES TO PHQ QUESTIONS 1-9: 6
7. TROUBLE CONCENTRATING ON THINGS, SUCH AS READING THE NEWSPAPER OR WATCHING TELEVISION: 3
1. LITTLE INTEREST OR PLEASURE IN DOING THINGS: 0
8. MOVING OR SPEAKING SO SLOWLY THAT OTHER PEOPLE COULD HAVE NOTICED. OR THE OPPOSITE, BEING SO FIGETY OR RESTLESS THAT YOU HAVE BEEN MOVING AROUND A LOT MORE THAN USUAL: 0
5. POOR APPETITE OR OVEREATING: 1
4. FEELING TIRED OR HAVING LITTLE ENERGY: 1
SUM OF ALL RESPONSES TO PHQ QUESTIONS 1-9: 6
SUM OF ALL RESPONSES TO PHQ9 QUESTIONS 1 & 2: 0
10. IF YOU CHECKED OFF ANY PROBLEMS, HOW DIFFICULT HAVE THESE PROBLEMS MADE IT FOR YOU TO DO YOUR WORK, TAKE CARE OF THINGS AT HOME, OR GET ALONG WITH OTHER PEOPLE: 0

## 2023-07-13 NOTE — PROGRESS NOTES
23    Andres Fischer  1985    Chief Complaint   Patient presents with    Consultation     Pt here for consult from [facility] d/t genital wart/skin tag x 20 + yrs. Pap-2023 @  per pt. Andres Fischer is a 40 y.o. female who presents today for evaluation of right labial vulvar lesion. Past Medical History:   Diagnosis Date    ADHD     Anxiety disorder     Arthritis     Asthma     Follows with Lizbeth    Bipolar disorder ([address])     Not on meds as of 12/3/19    Depression     currently not on meds (12/3/19)    Drug addiction ([address])     states previous addiction to vicodin (), denies current use. Hx + UDS for cocaine, 12/3/2019    Dysmenorrhea     Family history of heart disease     \"had to see Dr Michel Easley and he said every thing ok- approx 2 yrs ago \"    Genital warts     H/O echocardiogram 2018    Technically difficult examination due to body habitus. Left ventricular function is low normal, EF is estimated at 45-50%. Mild concentric left ventricular hypertrophy. Mildly dilated left atrium. Right ventricular systolic pressure of 29 mm Hg. Mild tricuspid regurgitation, no evidence of any pericardial effusion.      LIDPPEXO(243.9)     Last headache:  19    History of exercise stress test 2017    treadmill    Irregular menses     Menorrhagia     Miscarriage     x4    Pain management     Was Dr. Arely Umaña - left his practice 2019    Prolonged emergence from general anesthesia     \"with last surgery had trouble keeping me awake and ended up admitted me- and oxygen level kept going down    Skin tag     Sleep apnea     sleep study - has cpap does not wear daily    Wears glasses        Past Surgical History:   Procedure Laterality Date    CARPAL TUNNEL RELEASE Left 10/2019    Dr. Andres [place] Right 2016     SECTION      2012 & 2014    [address]       & 2011    HERNIA REPAIR N/A 2020    HERNIA SUPRAUMBILICAL/ VENTRAL REPAIR

## 2023-07-20 ENCOUNTER — OFFICE VISIT (OUTPATIENT)
Dept: OBGYN | Age: 38
End: 2023-07-20
Payer: COMMERCIAL

## 2023-07-20 VITALS
DIASTOLIC BLOOD PRESSURE: 76 MMHG | BODY MASS INDEX: 39.68 KG/M2 | SYSTOLIC BLOOD PRESSURE: 137 MMHG | HEIGHT: 72 IN | HEART RATE: 80 BPM | WEIGHT: 293 LBS

## 2023-07-20 DIAGNOSIS — G89.18 POSTOPERATIVE PAIN: Primary | ICD-10-CM

## 2023-07-20 PROCEDURE — G8427 DOCREV CUR MEDS BY ELIG CLIN: HCPCS | Performed by: OBSTETRICS & GYNECOLOGY

## 2023-07-20 PROCEDURE — 99214 OFFICE O/P EST MOD 30 MIN: CPT | Performed by: OBSTETRICS & GYNECOLOGY

## 2023-07-20 PROCEDURE — G8417 CALC BMI ABV UP PARAM F/U: HCPCS | Performed by: OBSTETRICS & GYNECOLOGY

## 2023-07-20 PROCEDURE — 4004F PT TOBACCO SCREEN RCVD TLK: CPT | Performed by: OBSTETRICS & GYNECOLOGY

## 2023-07-21 ENCOUNTER — TELEPHONE (OUTPATIENT)
Dept: OBGYN | Age: 38
End: 2023-07-21

## 2023-07-23 RX ORDER — KETOROLAC TROMETHAMINE 10 MG/1
10 TABLET, FILM COATED ORAL EVERY 6 HOURS PRN
Qty: 20 TABLET | Refills: 0 | Status: SHIPPED | OUTPATIENT
Start: 2023-07-23 | End: 2024-07-22

## 2023-07-25 ENCOUNTER — OFFICE VISIT (OUTPATIENT)
Dept: OBGYN | Age: 38
End: 2023-07-25
Payer: COMMERCIAL

## 2023-07-25 VITALS
HEIGHT: 72 IN | WEIGHT: 293 LBS | HEART RATE: 83 BPM | DIASTOLIC BLOOD PRESSURE: 77 MMHG | BODY MASS INDEX: 39.68 KG/M2 | SYSTOLIC BLOOD PRESSURE: 139 MMHG

## 2023-07-25 DIAGNOSIS — N64.4 BREAST TENDERNESS: ICD-10-CM

## 2023-07-25 DIAGNOSIS — R11.0 NAUSEA: ICD-10-CM

## 2023-07-25 DIAGNOSIS — Z11.3 SCREEN FOR STD (SEXUALLY TRANSMITTED DISEASE): ICD-10-CM

## 2023-07-25 DIAGNOSIS — J02.9 SORE THROAT: ICD-10-CM

## 2023-07-25 DIAGNOSIS — Z72.51 UNPROTECTED SEXUAL INTERCOURSE: Primary | ICD-10-CM

## 2023-07-25 LAB
CONTROL: NORMAL
PREGNANCY TEST URINE, POC: NORMAL

## 2023-07-25 PROCEDURE — 81025 URINE PREGNANCY TEST: CPT | Performed by: NURSE PRACTITIONER

## 2023-07-25 PROCEDURE — G8417 CALC BMI ABV UP PARAM F/U: HCPCS | Performed by: NURSE PRACTITIONER

## 2023-07-25 PROCEDURE — 4004F PT TOBACCO SCREEN RCVD TLK: CPT | Performed by: NURSE PRACTITIONER

## 2023-07-25 PROCEDURE — 99213 OFFICE O/P EST LOW 20 MIN: CPT | Performed by: NURSE PRACTITIONER

## 2023-07-25 PROCEDURE — G8427 DOCREV CUR MEDS BY ELIG CLIN: HCPCS | Performed by: NURSE PRACTITIONER

## 2023-07-25 ASSESSMENT — ENCOUNTER SYMPTOMS
VOMITING: 0
GASTROINTESTINAL NEGATIVE: 1
DIARRHEA: 0
RESPIRATORY NEGATIVE: 1
SHORTNESS OF BREATH: 0
NAUSEA: 0
CONSTIPATION: 0
ABDOMINAL PAIN: 0

## 2023-07-25 NOTE — PROGRESS NOTES
23    Nga Smith  1985    Chief Complaint   Patient presents with    Other     Pt requesting std check. Pt c/o sore throat and has noticed oral lesions following oral intercourse x couple days. Nga Smith is a 40 y.o. female who presents today for evaluation of see above    Past Medical History:   Diagnosis Date    ADHD     Anxiety disorder     Arthritis     Asthma     Follows with Lizbeth    Bipolar disorder ([address])     Not on meds as of 12/3/19    Depression     currently not on meds (12/3/19)    Drug addiction ([address])     states previous addiction to vicodin (), denies current use. Hx + UDS for cocaine, 12/3/2019    Dysmenorrhea     Family history of heart disease     \"had to see Dr Luzmaria Batista and he said every thing ok- approx 2 yrs ago \"    Genital warts     H/O echocardiogram 2018    Technically difficult examination due to body habitus. Left ventricular function is low normal, EF is estimated at 45-50%. Mild concentric left ventricular hypertrophy. Mildly dilated left atrium. Right ventricular systolic pressure of 29 mm Hg. Mild tricuspid regurgitation, no evidence of any pericardial effusion.      OIJKLVON(556.1)     Last headache:  19    History of exercise stress test 2017    treadmill    Irregular menses     Menorrhagia     Miscarriage     x4    Pain management     Was Dr. Maxwell Dougherty - left his practice 2019    Prolonged emergence from general anesthesia     \"with last surgery had trouble keeping me awake and ended up admitted me- and oxygen level kept going down    Skin tag     Sleep apnea     sleep study - has cpap does not wear daily    Vulvar lesion     Wears glasses        Past Surgical History:   Procedure Laterality Date    CARPAL TUNNEL RELEASE Left 10/2019    Dr. Maame Cruz Right 2016     SECTION      2012 & 2014    [address]       & 2011    HERNIA REPAIR N/A 2020    HERNIA SUPRAUMBILICAL/

## 2023-07-26 LAB
C TRACH DNA CVX QL NAA+PROBE: NEGATIVE
CANDIDA DNA VAG QL NAA+PROBE: ABNORMAL
G VAGINALIS DNA SPEC QL NAA+PROBE: ABNORMAL
HBV SURFACE AG SERPL QL IA: NORMAL
HERPES SIMPLEX VIRUS 1 IGG: NEGATIVE
HERPES SIMPLEX VIRUS 2 IGG: POSITIVE
HIV 1+2 AB+HIV1 P24 AG SERPL QL IA: NORMAL
HIV 2 AB SERPL QL IA: NORMAL
HIV1 AB SERPL QL IA: NORMAL
HIV1 P24 AG SERPL QL IA: NORMAL
N GONORRHOEA DNA CERV MUCUS QL NAA+PROBE: NEGATIVE
REAGIN+T PALLIDUM IGG+IGM SERPL-IMP: NORMAL
T VAGINALIS DNA VAG QL NAA+PROBE: ABNORMAL

## 2023-07-27 PROBLEM — B00.9 HSV-2 INFECTION: Status: ACTIVE | Noted: 2023-07-27

## 2023-07-30 LAB — N GONORRHOEA SPEC QL CULT: NORMAL

## 2023-08-01 ENCOUNTER — TELEPHONE (OUTPATIENT)
Dept: OBGYN | Age: 38
End: 2023-08-01

## 2023-08-01 NOTE — TELEPHONE ENCOUNTER
Please call lab to clarify Gonorrhea culture results; this was collected from her throat (source says throat) but results say normal genital velvet

## 2023-08-02 ENCOUNTER — OFFICE VISIT (OUTPATIENT)
Dept: OBGYN | Age: 38
End: 2023-08-02
Payer: COMMERCIAL

## 2023-08-02 VITALS
BODY MASS INDEX: 39.68 KG/M2 | HEIGHT: 72 IN | DIASTOLIC BLOOD PRESSURE: 78 MMHG | WEIGHT: 293 LBS | SYSTOLIC BLOOD PRESSURE: 137 MMHG

## 2023-08-02 DIAGNOSIS — N90.3 DIFFERENTIATED VULVAR INTRAEPITHELIAL NEOPLASIA (DVIN): Primary | ICD-10-CM

## 2023-08-02 DIAGNOSIS — N76.0 BACTERIAL VAGINOSIS: ICD-10-CM

## 2023-08-02 DIAGNOSIS — B96.89 BACTERIAL VAGINOSIS: ICD-10-CM

## 2023-08-02 PROCEDURE — 4004F PT TOBACCO SCREEN RCVD TLK: CPT | Performed by: OBSTETRICS & GYNECOLOGY

## 2023-08-02 PROCEDURE — 99214 OFFICE O/P EST MOD 30 MIN: CPT | Performed by: OBSTETRICS & GYNECOLOGY

## 2023-08-02 PROCEDURE — G8417 CALC BMI ABV UP PARAM F/U: HCPCS | Performed by: OBSTETRICS & GYNECOLOGY

## 2023-08-02 PROCEDURE — G8427 DOCREV CUR MEDS BY ELIG CLIN: HCPCS | Performed by: OBSTETRICS & GYNECOLOGY

## 2023-08-02 NOTE — TELEPHONE ENCOUNTER
Spoke w/ Jeanie in [employer]. She states even though, the culture came  back normal for genital velvet.  The test is still considered normal when they don't find any sensitivities to run whether test source was throat or genital.

## 2023-08-03 ENCOUNTER — HOSPITAL ENCOUNTER (EMERGENCY)
Age: 38
Discharge: HOME | End: 2023-08-03
Payer: COMMERCIAL

## 2023-08-03 ENCOUNTER — APPOINTMENT (OUTPATIENT)
Dept: GENERAL RADIOLOGY | Age: 38
End: 2023-08-03
Payer: COMMERCIAL

## 2023-08-03 VITALS
SYSTOLIC BLOOD PRESSURE: 166 MMHG | WEIGHT: 293 LBS | BODY MASS INDEX: 39.68 KG/M2 | OXYGEN SATURATION: 98 % | RESPIRATION RATE: 20 BRPM | TEMPERATURE: 98.1 F | HEIGHT: 72 IN | DIASTOLIC BLOOD PRESSURE: 105 MMHG | HEART RATE: 83 BPM

## 2023-08-03 DIAGNOSIS — R07.9 CHEST PAIN, UNSPECIFIED TYPE: Primary | ICD-10-CM

## 2023-08-03 DIAGNOSIS — R51.9 NONINTRACTABLE HEADACHE, UNSPECIFIED CHRONICITY PATTERN, UNSPECIFIED HEADACHE TYPE: ICD-10-CM

## 2023-08-03 LAB
ALBUMIN SERPL-MCNC: 4.2 GM/DL (ref 3.4–5)
ALP BLD-CCNC: 73 IU/L (ref 40–129)
ALT SERPL-CCNC: 25 U/L (ref 10–40)
AMPHETAMINES: NEGATIVE
ANION GAP SERPL CALCULATED.3IONS-SCNC: 11 MMOL/L (ref 4–16)
AST SERPL-CCNC: 19 IU/L (ref 15–37)
BARBITURATE SCREEN URINE: NEGATIVE
BASOPHILS ABSOLUTE: 0.1 K/CU MM
BASOPHILS RELATIVE PERCENT: 0.6 % (ref 0–1)
BENZODIAZEPINE SCREEN, URINE: NEGATIVE
BILIRUB SERPL-MCNC: 0.5 MG/DL (ref 0–1)
BILIRUBIN URINE: NEGATIVE MG/DL
BLOOD, URINE: NEGATIVE
BUN SERPL-MCNC: 9 MG/DL (ref 6–23)
CALCIUM SERPL-MCNC: 9.1 MG/DL (ref 8.3–10.6)
CANNABINOID SCREEN URINE: NEGATIVE
CHLORIDE BLD-SCNC: 103 MMOL/L (ref 99–110)
CLARITY: CLEAR
CO2: 25 MMOL/L (ref 21–32)
COCAINE METABOLITE: NEGATIVE
COLOR: YELLOW
COMMENT UA: NORMAL
CREAT SERPL-MCNC: 0.7 MG/DL (ref 0.6–1.1)
DIFFERENTIAL TYPE: ABNORMAL
EOSINOPHILS ABSOLUTE: 0.4 K/CU MM
EOSINOPHILS RELATIVE PERCENT: 4.5 % (ref 0–3)
FENTANYL URINE: NEGATIVE
GFR SERPL CREATININE-BSD FRML MDRD: >60 ML/MIN/1.73M2
GLUCOSE SERPL-MCNC: 95 MG/DL (ref 70–99)
GLUCOSE, URINE: NEGATIVE MG/DL
HCG QUALITATIVE: NEGATIVE
HCT VFR BLD CALC: 42.4 % (ref 37–47)
HEMOGLOBIN: 14.1 GM/DL (ref 12.5–16)
IMMATURE NEUTROPHIL %: 0.2 % (ref 0–0.43)
KETONES, URINE: NEGATIVE MG/DL
LEUKOCYTE ESTERASE, URINE: NEGATIVE
LYMPHOCYTES ABSOLUTE: 2.2 K/CU MM
LYMPHOCYTES RELATIVE PERCENT: 22.5 % (ref 24–44)
MAGNESIUM: 2 MG/DL (ref 1.8–2.4)
MCH RBC QN AUTO: 30.3 PG (ref 27–31)
MCHC RBC AUTO-ENTMCNC: 33.3 % (ref 32–36)
MCV RBC AUTO: 91 FL (ref 78–100)
MONOCYTES ABSOLUTE: 0.5 K/CU MM
MONOCYTES RELATIVE PERCENT: 5 % (ref 0–4)
NITRITE URINE, QUANTITATIVE: NEGATIVE
NUCLEATED RBC %: 0 %
OPIATES, URINE: ABNORMAL
OXYCODONE, OPI5M: NEGATIVE
PDW BLD-RTO: 12.5 % (ref 11.7–14.9)
PH, URINE: 6.5 (ref 5–8)
PLATELET # BLD: 282 K/CU MM (ref 140–440)
PMV BLD AUTO: 9.7 FL (ref 7.5–11.1)
POTASSIUM SERPL-SCNC: 4.3 MMOL/L (ref 3.5–5.1)
PROTEIN UA: NEGATIVE MG/DL
RBC # BLD: 4.66 M/CU MM (ref 4.2–5.4)
SEGMENTED NEUTROPHILS ABSOLUTE COUNT: 6.5 K/CU MM
SEGMENTED NEUTROPHILS RELATIVE PERCENT: 67.2 % (ref 36–66)
SODIUM BLD-SCNC: 139 MMOL/L (ref 135–145)
SPECIFIC GRAVITY UA: 1.01 (ref 1–1.03)
TOTAL IMMATURE NEUTOROPHIL: 0.02 K/CU MM
TOTAL NUCLEATED RBC: 0 K/CU MM
TOTAL PROTEIN: 6.6 GM/DL (ref 6.4–8.2)
TROPONIN T: <0.01 NG/ML
TROPONIN T: <0.01 NG/ML
UROBILINOGEN, URINE: 0.2 MG/DL (ref 0.2–1)
WBC # BLD: 9.6 K/CU MM (ref 4–10.5)

## 2023-08-03 PROCEDURE — 80307 DRUG TEST PRSMV CHEM ANLYZR: CPT

## 2023-08-03 PROCEDURE — 83735 ASSAY OF MAGNESIUM: CPT

## 2023-08-03 PROCEDURE — 96374 THER/PROPH/DIAG INJ IV PUSH: CPT

## 2023-08-03 PROCEDURE — 93005 ELECTROCARDIOGRAM TRACING: CPT | Performed by: EMERGENCY MEDICINE

## 2023-08-03 PROCEDURE — 99285 EMERGENCY DEPT VISIT HI MDM: CPT

## 2023-08-03 PROCEDURE — 80053 COMPREHEN METABOLIC PANEL: CPT

## 2023-08-03 PROCEDURE — 84703 CHORIONIC GONADOTROPIN ASSAY: CPT

## 2023-08-03 PROCEDURE — 84484 ASSAY OF TROPONIN QUANT: CPT

## 2023-08-03 PROCEDURE — 71045 X-RAY EXAM CHEST 1 VIEW: CPT

## 2023-08-03 PROCEDURE — 6360000002 HC RX W HCPCS: Performed by: PHYSICIAN ASSISTANT

## 2023-08-03 PROCEDURE — 2580000003 HC RX 258: Performed by: PHYSICIAN ASSISTANT

## 2023-08-03 PROCEDURE — 6370000000 HC RX 637 (ALT 250 FOR IP): Performed by: PHYSICIAN ASSISTANT

## 2023-08-03 PROCEDURE — 81003 URINALYSIS AUTO W/O SCOPE: CPT

## 2023-08-03 PROCEDURE — 85025 COMPLETE CBC W/AUTO DIFF WBC: CPT

## 2023-08-03 RX ORDER — NITROGLYCERIN 0.4 MG/1
0.4 TABLET SUBLINGUAL EVERY 5 MIN PRN
Status: DISCONTINUED | OUTPATIENT
Start: 2023-08-03 | End: 2023-08-03 | Stop reason: HOSPADM

## 2023-08-03 RX ORDER — 0.9 % SODIUM CHLORIDE 0.9 %
500 INTRAVENOUS SOLUTION INTRAVENOUS ONCE
Status: COMPLETED | OUTPATIENT
Start: 2023-08-03 | End: 2023-08-03

## 2023-08-03 RX ORDER — BUTALBITAL, ACETAMINOPHEN AND CAFFEINE 50; 325; 40 MG/1; MG/1; MG/1
1 TABLET ORAL ONCE
Status: COMPLETED | OUTPATIENT
Start: 2023-08-03 | End: 2023-08-03

## 2023-08-03 RX ORDER — METRONIDAZOLE 500 MG/1
500 TABLET ORAL 2 TIMES DAILY
Qty: 14 TABLET | Refills: 0 | Status: SHIPPED | OUTPATIENT
Start: 2023-08-03 | End: 2023-08-10

## 2023-08-03 RX ORDER — ASPIRIN 325 MG
325 TABLET ORAL ONCE
Status: COMPLETED | OUTPATIENT
Start: 2023-08-03 | End: 2023-08-03

## 2023-08-03 RX ORDER — MORPHINE SULFATE 4 MG/ML
4 INJECTION, SOLUTION INTRAMUSCULAR; INTRAVENOUS ONCE
Status: COMPLETED | OUTPATIENT
Start: 2023-08-03 | End: 2023-08-03

## 2023-08-03 RX ADMIN — BUTALBITAL, ACETAMINOPHEN AND CAFFEINE 1 TABLET: 325; 50; 40 TABLET ORAL at 17:08

## 2023-08-03 RX ADMIN — NITROGLYCERIN 0.4 MG: 0.4 TABLET, ORALLY DISINTEGRATING SUBLINGUAL at 16:09

## 2023-08-03 RX ADMIN — MORPHINE SULFATE 4 MG: 4 INJECTION, SOLUTION INTRAMUSCULAR; INTRAVENOUS at 16:09

## 2023-08-03 RX ADMIN — ASPIRIN 325 MG: 325 TABLET ORAL at 16:09

## 2023-08-03 RX ADMIN — SODIUM CHLORIDE 500 ML: 9 INJECTION, SOLUTION INTRAVENOUS at 16:10

## 2023-08-03 ASSESSMENT — PAIN SCALES - GENERAL
PAINLEVEL_OUTOF10: 8
PAINLEVEL_OUTOF10: 8

## 2023-08-03 ASSESSMENT — HEART SCORE: ECG: 0

## 2023-08-03 NOTE — ED PROVIDER NOTES
12 lead EKG per my interpretation:  Normal Sinus Rhythm at 76  Axis is   Normal  QTc is  within an acceptable range  There is no specific T wave changes appreciated. There is no specific ST wave changes appreciated. No STEMI    Prior EKG to compare with was available and no clinically significant change in overall morphology compared to prior.     MD Alberta Miller MD  08/03/23 [id number]

## 2023-08-04 LAB
EKG ATRIAL RATE: 76 BPM
EKG DIAGNOSIS: NORMAL
EKG P AXIS: 13 DEGREES
EKG P-R INTERVAL: 174 MS
EKG Q-T INTERVAL: 380 MS
EKG QRS DURATION: 86 MS
EKG QTC CALCULATION (BAZETT): 427 MS
EKG R AXIS: 11 DEGREES
EKG T AXIS: 48 DEGREES
EKG VENTRICULAR RATE: 76 BPM

## 2023-08-04 PROCEDURE — 93010 ELECTROCARDIOGRAM REPORT: CPT | Performed by: INTERNAL MEDICINE

## 2023-08-06 ENCOUNTER — HOSPITAL ENCOUNTER (EMERGENCY)
Age: 38
Discharge: HOME | End: 2023-08-06
Payer: COMMERCIAL

## 2023-08-06 ENCOUNTER — APPOINTMENT (OUTPATIENT)
Dept: GENERAL RADIOLOGY | Age: 38
End: 2023-08-06
Payer: COMMERCIAL

## 2023-08-06 VITALS
HEART RATE: 72 BPM | RESPIRATION RATE: 19 BRPM | HEIGHT: 72 IN | SYSTOLIC BLOOD PRESSURE: 156 MMHG | BODY MASS INDEX: 39.68 KG/M2 | OXYGEN SATURATION: 99 % | DIASTOLIC BLOOD PRESSURE: 83 MMHG | TEMPERATURE: 98.5 F | WEIGHT: 293 LBS

## 2023-08-06 DIAGNOSIS — S83.92XA SPRAIN OF LEFT KNEE, UNSPECIFIED LIGAMENT, INITIAL ENCOUNTER: Primary | ICD-10-CM

## 2023-08-06 PROCEDURE — 73560 X-RAY EXAM OF KNEE 1 OR 2: CPT

## 2023-08-06 PROCEDURE — 99283 EMERGENCY DEPT VISIT LOW MDM: CPT

## 2023-08-06 PROCEDURE — 6370000000 HC RX 637 (ALT 250 FOR IP): Performed by: NURSE PRACTITIONER

## 2023-08-06 RX ORDER — HYDROCODONE BITARTRATE AND ACETAMINOPHEN 5; 325 MG/1; MG/1
1 TABLET ORAL EVERY 6 HOURS PRN
Status: DISCONTINUED | OUTPATIENT
Start: 2023-08-06 | End: 2023-08-06 | Stop reason: HOSPADM

## 2023-08-06 RX ORDER — IBUPROFEN 600 MG/1
600 TABLET ORAL 3 TIMES DAILY PRN
Qty: 30 TABLET | Refills: 0 | Status: SHIPPED | OUTPATIENT
Start: 2023-08-06

## 2023-08-06 RX ADMIN — HYDROCODONE BITARTRATE AND ACETAMINOPHEN 1 TABLET: 5; 325 TABLET ORAL at 17:33

## 2023-08-06 ASSESSMENT — PAIN DESCRIPTION - ORIENTATION
ORIENTATION: LEFT
ORIENTATION: LEFT

## 2023-08-06 ASSESSMENT — PAIN DESCRIPTION - DESCRIPTORS: DESCRIPTORS: TINGLING;SHOOTING

## 2023-08-06 ASSESSMENT — ENCOUNTER SYMPTOMS
TROUBLE SWALLOWING: 0
SHORTNESS OF BREATH: 0
SORE THROAT: 0
ABDOMINAL PAIN: 0

## 2023-08-06 ASSESSMENT — PAIN DESCRIPTION - LOCATION
LOCATION: KNEE
LOCATION: KNEE

## 2023-08-06 ASSESSMENT — PAIN SCALES - GENERAL
PAINLEVEL_OUTOF10: 8
PAINLEVEL_OUTOF10: 9

## 2023-08-06 NOTE — ED PROVIDER NOTES
[address] ENCOUNTER      Pt Name: Jose A Castro  MRN: [id number]  [address] [facility] [place] 1985  Date of evaluation: 2023  Provider: MARU Rondon - CNP  PCP: Contreras Stacy MD  Note Started: 5:23 PM EDT 23    I am the Primary Clinician of Record. [place]. I have evaluated this patient. CHIEF COMPLAINT       Chief Complaint   Patient presents with    Knee Injury     Brandy Hammer out for birthday last night, did the splits and when standing back up, felt a shift and heard a pop and then fell. HISTORY OF PRESENT ILLNESS: 1 or more Elements   Jose A Castro is a 40 y.o. female who presents to the ER with chief complaint of left knee pain. This occurred last night when she was out celebrating her birthday. She admits that she was drinking. States that she tried to do the splits and heard a pop in her left knee. Went home and went to bed however woke up this morning and was unable to weight on the left leg due to discomfort. Has taken Tylenol for discomfort without improvement. I have reviewed the nursing triage documentation and agree unless otherwise noted. REVIEW OF SYSTEMS :    Review of Systems   Constitutional:  Negative for fatigue and fever. HENT:  Negative for sore throat and trouble swallowing. Respiratory:  Negative for shortness of breath. Cardiovascular:  Negative for chest pain. Gastrointestinal:  Negative for abdominal pain. Genitourinary:  Negative for dysuria. Musculoskeletal:  Positive for arthralgias, gait problem and joint swelling. Negative for myalgias. Skin:  Negative for rash. Neurological:  Negative for weakness and headaches. Positives and Pertinent negatives as per HPI.    SURGICAL HISTORY     Past Surgical History:   Procedure Laterality Date    CARPAL TUNNEL RELEASE Left 10/2019    Dr. Chaves Oh Right 2016     SECTION      2012 &

## 2023-08-06 NOTE — ED NOTES
Pt states she had been drinking last night, did the splits felt a pop on the left knee. C/o pain to the left outer knee.       Issa Trejo RN  08/06/23 [id number]

## 2023-09-13 ENCOUNTER — TELEPHONE (OUTPATIENT)
Dept: OBGYN | Age: 38
End: 2023-09-13

## 2023-09-13 RX ORDER — VALACYCLOVIR HYDROCHLORIDE 1 G/1
1000 TABLET, FILM COATED ORAL 2 TIMES DAILY
Qty: 20 TABLET | Refills: 0 | Status: SHIPPED | OUTPATIENT
Start: 2023-09-13

## 2023-10-16 RX ORDER — VALACYCLOVIR HYDROCHLORIDE 1 G/1
1000 TABLET, FILM COATED ORAL 2 TIMES DAILY
Qty: 20 TABLET | Refills: 0 | Status: SHIPPED | OUTPATIENT
Start: 2023-10-16

## 2023-11-04 ENCOUNTER — HOSPITAL ENCOUNTER (EMERGENCY)
Age: 38
Discharge: HOME | End: 2023-11-04
Payer: COMMERCIAL

## 2023-11-04 ENCOUNTER — APPOINTMENT (OUTPATIENT)
Dept: CT IMAGING | Age: 38
End: 2023-11-04
Payer: COMMERCIAL

## 2023-11-04 ENCOUNTER — APPOINTMENT (OUTPATIENT)
Dept: GENERAL RADIOLOGY | Age: 38
End: 2023-11-04
Payer: COMMERCIAL

## 2023-11-04 VITALS
BODY MASS INDEX: 39.68 KG/M2 | RESPIRATION RATE: 16 BRPM | TEMPERATURE: 98.4 F | WEIGHT: 293 LBS | HEART RATE: 120 BPM | OXYGEN SATURATION: 98 % | HEIGHT: 72 IN | SYSTOLIC BLOOD PRESSURE: 108 MMHG | DIASTOLIC BLOOD PRESSURE: 61 MMHG

## 2023-11-04 DIAGNOSIS — S62.662A CLOSED NONDISPLACED FRACTURE OF DISTAL PHALANX OF RIGHT MIDDLE FINGER, INITIAL ENCOUNTER: Primary | ICD-10-CM

## 2023-11-04 DIAGNOSIS — S61.309A TRAUMATIC AVULSION OF NAIL PLATE OF FINGER, INITIAL ENCOUNTER: ICD-10-CM

## 2023-11-04 PROCEDURE — 99284 EMERGENCY DEPT VISIT MOD MDM: CPT

## 2023-11-04 PROCEDURE — 72125 CT NECK SPINE W/O DYE: CPT

## 2023-11-04 PROCEDURE — 70450 CT HEAD/BRAIN W/O DYE: CPT

## 2023-11-04 PROCEDURE — 64450 NJX AA&/STRD OTHER PN/BRANCH: CPT

## 2023-11-04 PROCEDURE — 6370000000 HC RX 637 (ALT 250 FOR IP): Performed by: PHYSICIAN ASSISTANT

## 2023-11-04 PROCEDURE — 73130 X-RAY EXAM OF HAND: CPT

## 2023-11-04 RX ORDER — ACETAMINOPHEN 500 MG
500 TABLET ORAL ONCE
Status: COMPLETED | OUTPATIENT
Start: 2023-11-04 | End: 2023-11-04

## 2023-11-04 RX ORDER — GINSENG 100 MG
CAPSULE ORAL ONCE
Status: DISCONTINUED | OUTPATIENT
Start: 2023-11-04 | End: 2023-11-05 | Stop reason: HOSPADM

## 2023-11-04 RX ORDER — IBUPROFEN 600 MG/1
600 TABLET ORAL ONCE
Status: DISCONTINUED | OUTPATIENT
Start: 2023-11-04 | End: 2023-11-04

## 2023-11-04 RX ORDER — LIDOCAINE HYDROCHLORIDE 20 MG/ML
10 INJECTION, SOLUTION INFILTRATION; PERINEURAL ONCE
Status: DISCONTINUED | OUTPATIENT
Start: 2023-11-04 | End: 2023-11-05 | Stop reason: HOSPADM

## 2023-11-04 RX ADMIN — ACETAMINOPHEN 500 MG: 500 TABLET ORAL at 21:20
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
## 2023-11-06 ENCOUNTER — OFFICE VISIT (OUTPATIENT)
Dept: ORTHOPEDIC SURGERY | Age: 38
End: 2023-11-06
Payer: COMMERCIAL

## 2023-11-06 VITALS — BODY MASS INDEX: 39.68 KG/M2 | HEIGHT: 72 IN | HEART RATE: 79 BPM | OXYGEN SATURATION: 98 % | WEIGHT: 293 LBS

## 2023-11-06 DIAGNOSIS — S62.662A CLOSED NONDISPLACED FRACTURE OF DISTAL PHALANX OF RIGHT MIDDLE FINGER, INITIAL ENCOUNTER: Primary | ICD-10-CM

## 2023-11-06 PROCEDURE — 99203 OFFICE O/P NEW LOW 30 MIN: CPT | Performed by: STUDENT IN AN ORGANIZED HEALTH CARE EDUCATION/TRAINING PROGRAM

## 2023-11-06 PROCEDURE — G8484 FLU IMMUNIZE NO ADMIN: HCPCS | Performed by: STUDENT IN AN ORGANIZED HEALTH CARE EDUCATION/TRAINING PROGRAM

## 2023-11-06 PROCEDURE — 4004F PT TOBACCO SCREEN RCVD TLK: CPT | Performed by: STUDENT IN AN ORGANIZED HEALTH CARE EDUCATION/TRAINING PROGRAM

## 2023-11-06 PROCEDURE — G8428 CUR MEDS NOT DOCUMENT: HCPCS | Performed by: STUDENT IN AN ORGANIZED HEALTH CARE EDUCATION/TRAINING PROGRAM

## 2023-11-06 PROCEDURE — G8417 CALC BMI ABV UP PARAM F/U: HCPCS | Performed by: STUDENT IN AN ORGANIZED HEALTH CARE EDUCATION/TRAINING PROGRAM

## 2023-11-06 RX ORDER — HYDROCODONE BITARTRATE AND ACETAMINOPHEN 5; 325 MG/1; MG/1
1 TABLET ORAL EVERY 4 HOURS PRN
Qty: 30 TABLET | Refills: 0 | Status: SHIPPED | OUTPATIENT
Start: 2023-11-06 | End: 2023-11-11

## 2023-11-06 ASSESSMENT — ENCOUNTER SYMPTOMS
BACK PAIN: 0
VOMITING: 0
COUGH: 0
COLOR CHANGE: 1
NAUSEA: 0
DIARRHEA: 0
SORE THROAT: 0
WHEEZING: 0
SHORTNESS OF BREATH: 0
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
## 2023-11-13 ENCOUNTER — TELEPHONE (OUTPATIENT)
Dept: ORTHOPEDIC SURGERY | Age: 38
End: 2023-11-13

## 2023-11-13 NOTE — TELEPHONE ENCOUNTER
Warmness is to be expected but if she is having significant swelling and significant numbness and tingling where she cannot feel her fingertips she needs to go to the ER or come into the office for evaluation.

## 2023-11-13 NOTE — TELEPHONE ENCOUNTER
Patient stated she is concerned because her finger is warm to the touch, and she is having numbness.

## 2024-01-03 ENCOUNTER — OFFICE VISIT (OUTPATIENT)
Dept: ORTHOPEDIC SURGERY | Age: 39
End: 2024-01-03
Payer: COMMERCIAL

## 2024-01-03 VITALS — SYSTOLIC BLOOD PRESSURE: 130 MMHG | HEART RATE: 84 BPM | DIASTOLIC BLOOD PRESSURE: 82 MMHG | OXYGEN SATURATION: 95 %

## 2024-01-03 DIAGNOSIS — S62.662A CLOSED NONDISPLACED FRACTURE OF DISTAL PHALANX OF RIGHT MIDDLE FINGER, INITIAL ENCOUNTER: Primary | ICD-10-CM

## 2024-01-03 PROCEDURE — 4004F PT TOBACCO SCREEN RCVD TLK: CPT | Performed by: STUDENT IN AN ORGANIZED HEALTH CARE EDUCATION/TRAINING PROGRAM

## 2024-01-03 PROCEDURE — G8484 FLU IMMUNIZE NO ADMIN: HCPCS | Performed by: STUDENT IN AN ORGANIZED HEALTH CARE EDUCATION/TRAINING PROGRAM

## 2024-01-03 PROCEDURE — G8427 DOCREV CUR MEDS BY ELIG CLIN: HCPCS | Performed by: STUDENT IN AN ORGANIZED HEALTH CARE EDUCATION/TRAINING PROGRAM

## 2024-01-03 PROCEDURE — G8417 CALC BMI ABV UP PARAM F/U: HCPCS | Performed by: STUDENT IN AN ORGANIZED HEALTH CARE EDUCATION/TRAINING PROGRAM

## 2024-01-03 PROCEDURE — 99213 OFFICE O/P EST LOW 20 MIN: CPT | Performed by: STUDENT IN AN ORGANIZED HEALTH CARE EDUCATION/TRAINING PROGRAM

## 2024-01-03 RX ORDER — IBUPROFEN 800 MG/1
800 TABLET ORAL
Qty: 90 TABLET | Refills: 0 | Status: SHIPPED | OUTPATIENT
Start: 2024-01-03

## 2024-01-03 RX ORDER — TRAMADOL HYDROCHLORIDE 50 MG/1
50 TABLET ORAL EVERY 4 HOURS PRN
Qty: 18 TABLET | Refills: 0 | Status: SHIPPED | OUTPATIENT
Start: 2024-01-03 | End: 2024-01-06

## 2024-01-03 RX ORDER — DIPHENHYDRAMINE HCL 25 MG
25 CAPSULE ORAL EVERY 6 HOURS PRN
Qty: 30 CAPSULE | Refills: 2 | Status: SHIPPED | OUTPATIENT
Start: 2024-01-03 | End: 2024-01-26

## 2024-01-03 ASSESSMENT — ENCOUNTER SYMPTOMS
BACK PAIN: 0
COUGH: 0
WHEEZING: 0
DIARRHEA: 0
COLOR CHANGE: 1
NAUSEA: 0
SHORTNESS OF BREATH: 0
SORE THROAT: 0
VOMITING: 0

## 2024-01-03 NOTE — PROGRESS NOTES
Closed nondisplaced fracture of distal phalanx of right middle finger     Previously seen on 11/6/23. Was supposed to follow up three weeks. She did not follow up 3 weeks from last appointment. She is now being seen for the first time since 11/6/23, ~8 weeks after first consult.     States she rebroke her finger putting on her shoe and had increased swelling. Has since been seen in [place] ED on 12/29/23 for this same finger. She was given a new splint.
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
## 2024-01-08 RX ORDER — VALACYCLOVIR HYDROCHLORIDE 1 G/1
1000 TABLET, FILM COATED ORAL 2 TIMES DAILY
Qty: 20 TABLET | Refills: 0 | Status: SHIPPED | OUTPATIENT
Start: 2024-01-08

## 2024-01-20 ENCOUNTER — HOSPITAL ENCOUNTER (EMERGENCY)
Age: 39
Discharge: HOME | End: 2024-01-20
Payer: COMMERCIAL

## 2024-01-20 ENCOUNTER — APPOINTMENT (OUTPATIENT)
Dept: GENERAL RADIOLOGY | Age: 39
End: 2024-01-20
Payer: COMMERCIAL

## 2024-01-20 VITALS
HEIGHT: 72 IN | HEART RATE: 109 BPM | DIASTOLIC BLOOD PRESSURE: 67 MMHG | SYSTOLIC BLOOD PRESSURE: 138 MMHG | RESPIRATION RATE: 24 BRPM | BODY MASS INDEX: 39.68 KG/M2 | OXYGEN SATURATION: 99 % | WEIGHT: 293 LBS | TEMPERATURE: 99.1 F

## 2024-01-20 DIAGNOSIS — J44.1 COPD EXACERBATION (HCC): ICD-10-CM

## 2024-01-20 DIAGNOSIS — J11.1 INFLUENZA WITH RESPIRATORY MANIFESTATION OTHER THAN PNEUMONIA: Primary | ICD-10-CM

## 2024-01-20 DIAGNOSIS — Z72.0 TOBACCO ABUSE: ICD-10-CM

## 2024-01-20 LAB
INFLUENZA A ANTIGEN: DETECTED
INFLUENZA B ANTIGEN: NOT DETECTED
SARS-COV-2 RDRP RESP QL NAA+PROBE: NOT DETECTED
SOURCE: NORMAL
TROPONIN, HIGH SENSITIVITY: <6 NG/L (ref 0–14)

## 2024-01-20 PROCEDURE — 87635 SARS-COV-2 COVID-19 AMP PRB: CPT

## 2024-01-20 PROCEDURE — 99285 EMERGENCY DEPT VISIT HI MDM: CPT

## 2024-01-20 PROCEDURE — 6370000000 HC RX 637 (ALT 250 FOR IP): Performed by: PHYSICIAN ASSISTANT

## 2024-01-20 PROCEDURE — 87502 INFLUENZA DNA AMP PROBE: CPT

## 2024-01-20 PROCEDURE — 84484 ASSAY OF TROPONIN QUANT: CPT

## 2024-01-20 PROCEDURE — 94640 AIRWAY INHALATION TREATMENT: CPT

## 2024-01-20 PROCEDURE — 71045 X-RAY EXAM CHEST 1 VIEW: CPT

## 2024-01-20 PROCEDURE — 96374 THER/PROPH/DIAG INJ IV PUSH: CPT

## 2024-01-20 PROCEDURE — 6360000002 HC RX W HCPCS: Performed by: PHYSICIAN ASSISTANT

## 2024-01-20 PROCEDURE — 96375 TX/PRO/DX INJ NEW DRUG ADDON: CPT

## 2024-01-20 RX ORDER — PREDNISONE 50 MG/1
50 TABLET ORAL DAILY
Qty: 5 TABLET | Refills: 0 | Status: SHIPPED | OUTPATIENT
Start: 2024-01-20 | End: 2024-01-25

## 2024-01-20 RX ORDER — ALBUTEROL SULFATE 90 UG/1
2 AEROSOL, METERED RESPIRATORY (INHALATION) EVERY 4 HOURS PRN
Qty: 18 G | Refills: 1 | Status: SHIPPED | OUTPATIENT
Start: 2024-01-20 | End: 2024-01-20

## 2024-01-20 RX ORDER — IBUPROFEN 600 MG/1
600 TABLET ORAL EVERY 6 HOURS PRN
Qty: 20 TABLET | Refills: 0 | Status: SHIPPED | OUTPATIENT
Start: 2024-01-20 | End: 2024-01-20

## 2024-01-20 RX ORDER — ALBUTEROL SULFATE 90 UG/1
2 AEROSOL, METERED RESPIRATORY (INHALATION) EVERY 4 HOURS PRN
Qty: 18 G | Refills: 1 | Status: SHIPPED | OUTPATIENT
Start: 2024-01-20 | End: 2024-02-19

## 2024-01-20 RX ORDER — CODEINE PHOSPHATE/GUAIFENESIN 10-100MG/5
10 LIQUID (ML) ORAL 3 TIMES DAILY PRN
Qty: 100 ML | Refills: 0 | Status: SHIPPED | OUTPATIENT
Start: 2024-01-20 | End: 2024-01-27

## 2024-01-20 RX ORDER — PREDNISONE 50 MG/1
50 TABLET ORAL DAILY
Qty: 5 TABLET | Refills: 0 | Status: SHIPPED | OUTPATIENT
Start: 2024-01-20 | End: 2024-01-20

## 2024-01-20 RX ORDER — KETOROLAC TROMETHAMINE 15 MG/ML
15 INJECTION, SOLUTION INTRAMUSCULAR; INTRAVENOUS ONCE
Status: COMPLETED | OUTPATIENT
Start: 2024-01-20 | End: 2024-01-20

## 2024-01-20 RX ORDER — ACETAMINOPHEN 325 MG/1
650 TABLET ORAL ONCE
Status: COMPLETED | OUTPATIENT
Start: 2024-01-20 | End: 2024-01-20

## 2024-01-20 RX ORDER — IPRATROPIUM BROMIDE AND ALBUTEROL SULFATE 2.5; .5 MG/3ML; MG/3ML
3 SOLUTION RESPIRATORY (INHALATION) ONCE
Status: COMPLETED | OUTPATIENT
Start: 2024-01-20 | End: 2024-01-20

## 2024-01-20 RX ORDER — IBUPROFEN 600 MG/1
600 TABLET ORAL EVERY 6 HOURS PRN
Qty: 20 TABLET | Refills: 0 | Status: SHIPPED | OUTPATIENT
Start: 2024-01-20 | End: 2024-02-19

## 2024-01-20 RX ADMIN — KETOROLAC TROMETHAMINE 15 MG: 15 INJECTION, SOLUTION INTRAMUSCULAR; INTRAVENOUS at 20:28

## 2024-01-20 RX ADMIN — IPRATROPIUM BROMIDE AND ALBUTEROL SULFATE 3 DOSE: .5; 3 SOLUTION RESPIRATORY (INHALATION) at 22:39

## 2024-01-20 RX ADMIN — METHYLPREDNISOLONE SODIUM SUCCINATE 40 MG: 40 INJECTION, POWDER, FOR SOLUTION INTRAMUSCULAR; INTRAVENOUS at 20:27

## 2024-01-20 RX ADMIN — IPRATROPIUM BROMIDE AND ALBUTEROL SULFATE 3 DOSE: 2.5; .5 SOLUTION RESPIRATORY (INHALATION) at 20:26

## 2024-01-20 RX ADMIN — ACETAMINOPHEN 650 MG: 325 TABLET ORAL at 22:10

## 2024-01-20 ASSESSMENT — PAIN SCALES - GENERAL: PAINLEVEL_OUTOF10: 10

## 2024-01-21 LAB
EKG ATRIAL RATE: 84 BPM
EKG DIAGNOSIS: NORMAL
EKG P AXIS: 48 DEGREES
EKG P-R INTERVAL: 168 MS
EKG Q-T INTERVAL: 356 MS
EKG QRS DURATION: 76 MS
EKG QTC CALCULATION (BAZETT): 420 MS
EKG R AXIS: 34 DEGREES
EKG T AXIS: 63 DEGREES
EKG VENTRICULAR RATE: 84 BPM

## 2024-02-24 ENCOUNTER — APPOINTMENT (OUTPATIENT)
Dept: GENERAL RADIOLOGY | Age: 39
End: 2024-02-24
Payer: COMMERCIAL

## 2024-02-24 ENCOUNTER — HOSPITAL ENCOUNTER (EMERGENCY)
Age: 39
Discharge: HOME | End: 2024-02-24
Attending: EMERGENCY MEDICINE
Payer: COMMERCIAL

## 2024-02-24 VITALS
TEMPERATURE: 98.3 F | HEART RATE: 64 BPM | DIASTOLIC BLOOD PRESSURE: 63 MMHG | SYSTOLIC BLOOD PRESSURE: 125 MMHG | OXYGEN SATURATION: 93 % | RESPIRATION RATE: 23 BRPM

## 2024-02-24 DIAGNOSIS — R07.9 CHEST PAIN, UNSPECIFIED TYPE: Primary | ICD-10-CM

## 2024-02-24 LAB
ALBUMIN SERPL-MCNC: 3.5 GM/DL (ref 3.4–5)
ALP BLD-CCNC: 68 IU/L (ref 40–128)
ALT SERPL-CCNC: 16 U/L (ref 10–40)
ANION GAP SERPL CALCULATED.3IONS-SCNC: 10 MMOL/L (ref 7–16)
AST SERPL-CCNC: 20 IU/L (ref 15–37)
BASOPHILS ABSOLUTE: 0.1 K/CU MM
BASOPHILS RELATIVE PERCENT: 0.6 % (ref 0–1)
BILIRUB SERPL-MCNC: 0.2 MG/DL (ref 0–1)
BUN SERPL-MCNC: 8 MG/DL (ref 6–23)
CALCIUM SERPL-MCNC: 8.8 MG/DL (ref 8.3–10.6)
CHLORIDE BLD-SCNC: 101 MMOL/L (ref 99–110)
CO2: 27 MMOL/L (ref 21–32)
CREAT SERPL-MCNC: 0.6 MG/DL (ref 0.6–1.1)
D DIMER: 0.27 UG/ML (FEU)
DIFFERENTIAL TYPE: ABNORMAL
EOSINOPHILS ABSOLUTE: 0.4 K/CU MM
EOSINOPHILS RELATIVE PERCENT: 4.4 % (ref 0–3)
GFR SERPL CREATININE-BSD FRML MDRD: >60 ML/MIN/1.73M2
GLUCOSE SERPL-MCNC: 168 MG/DL (ref 70–99)
HCT VFR BLD CALC: 40.3 % (ref 37–47)
HEMOGLOBIN: 12.7 GM/DL (ref 12.5–16)
IMMATURE NEUTROPHIL %: 0.2 % (ref 0–0.43)
LIPASE: 14 IU/L (ref 13–60)
LYMPHOCYTES ABSOLUTE: 2.2 K/CU MM
LYMPHOCYTES RELATIVE PERCENT: 25.2 % (ref 24–44)
MAGNESIUM: 2 MG/DL (ref 1.8–2.4)
MCH RBC QN AUTO: 30 PG (ref 27–31)
MCHC RBC AUTO-ENTMCNC: 31.5 % (ref 32–36)
MCV RBC AUTO: 95.3 FL (ref 78–100)
MONOCYTES ABSOLUTE: 0.5 K/CU MM
MONOCYTES RELATIVE PERCENT: 6.1 % (ref 0–4)
NUCLEATED RBC %: 0 %
PDW BLD-RTO: 12.9 % (ref 11.7–14.9)
PLATELET # BLD: 260 K/CU MM (ref 140–440)
PMV BLD AUTO: 10.1 FL (ref 7.5–11.1)
POTASSIUM SERPL-SCNC: 3.4 MMOL/L (ref 3.5–5.1)
RBC # BLD: 4.23 M/CU MM (ref 4.2–5.4)
SEGMENTED NEUTROPHILS ABSOLUTE COUNT: 5.6 K/CU MM
SEGMENTED NEUTROPHILS RELATIVE PERCENT: 63.5 % (ref 36–66)
SODIUM BLD-SCNC: 138 MMOL/L (ref 135–145)
TOTAL IMMATURE NEUTOROPHIL: 0.02 K/CU MM
TOTAL NUCLEATED RBC: 0 K/CU MM
TOTAL PROTEIN: 6.6 GM/DL (ref 6.4–8.2)
TROPONIN, HIGH SENSITIVITY: <6 NG/L (ref 0–14)
WBC # BLD: 8.8 K/CU MM (ref 4–10.5)

## 2024-02-24 PROCEDURE — 85025 COMPLETE CBC W/AUTO DIFF WBC: CPT

## 2024-02-24 PROCEDURE — 99285 EMERGENCY DEPT VISIT HI MDM: CPT

## 2024-02-24 PROCEDURE — 71045 X-RAY EXAM CHEST 1 VIEW: CPT

## 2024-02-24 PROCEDURE — 83735 ASSAY OF MAGNESIUM: CPT

## 2024-02-24 PROCEDURE — 83690 ASSAY OF LIPASE: CPT

## 2024-02-24 PROCEDURE — 84484 ASSAY OF TROPONIN QUANT: CPT

## 2024-02-24 PROCEDURE — 80053 COMPREHEN METABOLIC PANEL: CPT

## 2024-02-24 PROCEDURE — 6370000000 HC RX 637 (ALT 250 FOR IP): Performed by: EMERGENCY MEDICINE

## 2024-02-24 PROCEDURE — 85379 FIBRIN DEGRADATION QUANT: CPT

## 2024-02-24 PROCEDURE — 93005 ELECTROCARDIOGRAM TRACING: CPT | Performed by: EMERGENCY MEDICINE

## 2024-02-24 RX ORDER — ACETAMINOPHEN 500 MG
1000 TABLET ORAL ONCE
Status: COMPLETED | OUTPATIENT
Start: 2024-02-24 | End: 2024-02-24

## 2024-02-24 RX ADMIN — ACETAMINOPHEN 1000 MG: 500 TABLET ORAL at 03:28

## 2024-02-24 NOTE — DISCHARGE INSTRUCTIONS
Please consult with your primary care physician for further care and recommendations  Return to ER as needed

## 2024-02-24 NOTE — ED PROVIDER NOTES
Emergency Department Encounter    Patient: Skylar Edouard  MRN: [id number]  : 1985  Date of Evaluation: 2024  ED Provider:  Noel Penn MD    Triage Chief Complaint:   Chest Pain    [place]:  Skylar Edouard is a 38 y.o. morbidly obese female that presents to emergency department complaint of a left-sided chest pain without radiation and associated with shortness of breath which started earlier tonight without any trauma fall or change in activity of fever chills or cough.  Patient denies leg pain or leg swelling.  Patient denies prior history of DVT or PE.  Patient does not admit to recent fall or any trauma.    ROS - see HPI, below listed is current ROS at time of my eval:  General:  No fevers, no chills, no weakness  ENT:  No sore throat, no nasal congestion, no hearing changes  Cardiovascular: Chest pain  Respiratory:  No shortness of breath, no cough, no wheezing  Gastrointestinal:  No pain, no nausea, no vomiting, no diarrhea  Musculoskeletal:  No muscle pain, no joint pain  Neurologic:  No speech problems, no headache, no extremity numbness, no extremity tingling, no extremity weakness  Psychiatric:  No anxiety  Genitourinary:  No dysuria, no hematuria  Extremities:  no edema, no pain    Past Medical History:   Diagnosis Date    ADHD     Anxiety disorder     Arthritis     Asthma     Follows with Lizbeth    Bipolar disorder (HCC)     Not on meds as of 12/3/19    Depression     currently not on meds (12/3/19)    Drug addiction (HCC)     states previous addiction to vicodin (), denies current use.   Hx + UDS for cocaine, 12/3/2019    Dysmenorrhea     Family history of heart disease     \"had to see Dr Gee and he said every thing ok- approx 2 yrs ago \"    Genital warts     H/O echocardiogram 2018    Technically difficult examination due to body habitus. Left ventricular function is low normal, EF is estimated at 45-50%. Mild concentric left ventricular hypertrophy. Mildly dilated left  0.5 K/CU MM    Eosinophils Absolute 0.4 K/CU MM    Basophils Absolute 0.1 K/CU MM    Nucleated RBC % 0.0 %    Total Nucleated RBC 0.0 K/CU MM    Total Immature Neutrophil 0.02 K/CU MM    Immature Neutrophil % 0.2 0 - 0.43 %   Comprehensive Metabolic Panel   Result Value Ref Range    Sodium 138 135 - 145 MMOL/L    Potassium 3.4 (L) 3.5 - 5.1 MMOL/L    Chloride 101 99 - 110 mMol/L    CO2 27 21 - 32 MMOL/L    Anion Gap 10 7 - 16    Glucose 168 (H) 70 - 99 MG/DL    BUN 8 6 - 23 MG/DL    Creatinine 0.6 0.6 - 1.1 MG/DL    Est, Glom Filt Rate >60 >60 mL/min/1.73m2    Calcium 8.8 8.3 - 10.6 MG/DL    Total Protein 6.6 6.4 - 8.2 GM/DL    Albumin 3.5 3.4 - 5.0 GM/DL    Total Bilirubin 0.2 0.0 - 1.0 MG/DL    Alkaline Phosphatase 68 40 - 128 IU/L    ALT 16 10 - 40 U/L    AST 20 15 - 37 IU/L   Troponin   Result Value Ref Range    Troponin, High Sensitivity <6 0 - 14 ng/L   Lipase   Result Value Ref Range    Lipase 14 13 - 60 IU/L   Magnesium   Result Value Ref Range    Magnesium 2.0 1.8 - 2.4 mg/dl   D-Dimer, Rapid   Result Value Ref Range    D-Dimer, Quant 0.27 <0.47 ug/mL (FEU)      Radiographs (if obtained):  Radiologist's Report Reviewed:  No results found.    EKG (if obtained): (All EKG's are interpreted by myself in the absence of a cardiologist)      MDM:  Patient presents emergency room with complaint of a reproducible left-sided chest pain.  Patient states the chest pain started earlier last night but not associated with leg pain or leg swelling or shortness of breath or fever or chills.  Patient does not appear toxic.  Patient vitals are stable.  EKG reveals normal sinus rhythm ventricular rate of 79 bpm with no ST elevations or ST depressions or Q waves.  No axis deviation noted.  QTc is 433 ms, QRS duration 82 ms and [place] interval is 194 ms.  Labs: Initial and repeat troponin are unremarkable.  Lipase is normal.  CBC and CMP lab results are unremarkable.  D-dimer is unremarkable.  Discharged home.  I have advised the

## 2024-02-24 NOTE — ED TRIAGE NOTES
Patient brought in by EMS with complaint of chest pain. Patient's chest pain began at approximately midnight.

## 2024-02-25 LAB
EKG ATRIAL RATE: 79 BPM
EKG DIAGNOSIS: NORMAL
EKG P AXIS: 15 DEGREES
EKG P-R INTERVAL: 194 MS
EKG Q-T INTERVAL: 378 MS
EKG QRS DURATION: 82 MS
EKG QTC CALCULATION (BAZETT): 433 MS
EKG R AXIS: 19 DEGREES
EKG T AXIS: 53 DEGREES
EKG VENTRICULAR RATE: 79 BPM

## 2024-02-26 PROCEDURE — 93010 ELECTROCARDIOGRAM REPORT: CPT | Performed by: INTERNAL MEDICINE

## 2024-06-27 ENCOUNTER — HOSPITAL ENCOUNTER (EMERGENCY)
Age: 39
Discharge: HOME | End: 2024-06-27
Attending: EMERGENCY MEDICINE
Payer: COMMERCIAL

## 2024-06-27 ENCOUNTER — APPOINTMENT (OUTPATIENT)
Dept: CT IMAGING | Age: 39
End: 2024-06-27
Payer: COMMERCIAL

## 2024-06-27 VITALS
SYSTOLIC BLOOD PRESSURE: 133 MMHG | BODY MASS INDEX: 39.68 KG/M2 | WEIGHT: 293 LBS | HEART RATE: 82 BPM | HEIGHT: 72 IN | DIASTOLIC BLOOD PRESSURE: 69 MMHG | OXYGEN SATURATION: 94 % | TEMPERATURE: 97.9 F | RESPIRATION RATE: 18 BRPM

## 2024-06-27 DIAGNOSIS — K92.2 LOWER GI BLEED: Primary | ICD-10-CM

## 2024-06-27 LAB
ALBUMIN SERPL-MCNC: 3.8 GM/DL (ref 3.4–5)
ALP BLD-CCNC: 74 IU/L (ref 40–129)
ALT SERPL-CCNC: 23 U/L (ref 10–40)
ANION GAP SERPL CALCULATED.3IONS-SCNC: 10 MMOL/L (ref 7–16)
AST SERPL-CCNC: 17 IU/L (ref 15–37)
BASOPHILS ABSOLUTE: 0 K/CU MM
BASOPHILS RELATIVE PERCENT: 0.4 % (ref 0–1)
BILIRUB SERPL-MCNC: 0.3 MG/DL (ref 0–1)
BUN SERPL-MCNC: 8 MG/DL (ref 6–23)
CALCIUM SERPL-MCNC: 9.4 MG/DL (ref 8.3–10.6)
CHLORIDE BLD-SCNC: 105 MMOL/L (ref 99–110)
CO2: 26 MMOL/L (ref 21–32)
CREAT SERPL-MCNC: 0.6 MG/DL (ref 0.6–1.1)
DIFFERENTIAL TYPE: ABNORMAL
EOSINOPHILS ABSOLUTE: 0.4 K/CU MM
EOSINOPHILS RELATIVE PERCENT: 4.8 % (ref 0–3)
GFR, ESTIMATED: >90 ML/MIN/1.73M2
GLUCOSE SERPL-MCNC: 125 MG/DL (ref 70–99)
HCT VFR BLD CALC: 43.9 % (ref 37–47)
HEMOGLOBIN: 14.1 GM/DL (ref 12.5–16)
IMMATURE NEUTROPHIL %: 0.3 % (ref 0–0.43)
INR BLD: 1 INDEX
LACTIC ACID, SEPSIS: 0.8 MMOL/L (ref 0.4–2)
LIPASE: 18 IU/L (ref 13–60)
LYMPHOCYTES ABSOLUTE: 1.9 K/CU MM
LYMPHOCYTES RELATIVE PERCENT: 21.4 % (ref 24–44)
MCH RBC QN AUTO: 30.1 PG (ref 27–31)
MCHC RBC AUTO-ENTMCNC: 32.1 % (ref 32–36)
MCV RBC AUTO: 93.8 FL (ref 78–100)
MONOCYTES ABSOLUTE: 0.5 K/CU MM
MONOCYTES RELATIVE PERCENT: 5.3 % (ref 0–4)
NEUTROPHILS ABSOLUTE: 6 K/CU MM
NEUTROPHILS RELATIVE PERCENT: 67.8 % (ref 36–66)
NUCLEATED RBC %: 0 %
PDW BLD-RTO: 12.4 % (ref 11.7–14.9)
PLATELET # BLD: 306 K/CU MM (ref 140–440)
PMV BLD AUTO: 10.1 FL (ref 7.5–11.1)
POTASSIUM SERPL-SCNC: 4.5 MMOL/L (ref 3.5–5.1)
PREGNANCY, SERUM: NEGATIVE
PROTHROMBIN TIME: 13.6 SECONDS (ref 11.7–14.5)
RBC # BLD: 4.68 M/CU MM (ref 4.2–5.4)
SODIUM BLD-SCNC: 141 MMOL/L (ref 135–145)
TOTAL IMMATURE NEUTOROPHIL: 0.03 K/CU MM
TOTAL NUCLEATED RBC: 0 K/CU MM
TOTAL PROTEIN: 6.4 GM/DL (ref 6.4–8.2)
WBC # BLD: 8.9 K/CU MM (ref 4–10.5)

## 2024-06-27 PROCEDURE — 99285 EMERGENCY DEPT VISIT HI MDM: CPT

## 2024-06-27 PROCEDURE — 6360000002 HC RX W HCPCS: Performed by: EMERGENCY MEDICINE

## 2024-06-27 PROCEDURE — 85025 COMPLETE CBC W/AUTO DIFF WBC: CPT

## 2024-06-27 PROCEDURE — 83690 ASSAY OF LIPASE: CPT

## 2024-06-27 PROCEDURE — 2580000003 HC RX 258: Performed by: EMERGENCY MEDICINE

## 2024-06-27 PROCEDURE — 85610 PROTHROMBIN TIME: CPT

## 2024-06-27 PROCEDURE — 83605 ASSAY OF LACTIC ACID: CPT

## 2024-06-27 PROCEDURE — 6360000004 HC RX CONTRAST MEDICATION: Performed by: EMERGENCY MEDICINE

## 2024-06-27 PROCEDURE — 80053 COMPREHEN METABOLIC PANEL: CPT

## 2024-06-27 PROCEDURE — 84703 CHORIONIC GONADOTROPIN ASSAY: CPT

## 2024-06-27 PROCEDURE — 96374 THER/PROPH/DIAG INJ IV PUSH: CPT

## 2024-06-27 PROCEDURE — 6370000000 HC RX 637 (ALT 250 FOR IP): Performed by: EMERGENCY MEDICINE

## 2024-06-27 PROCEDURE — 74177 CT ABD & PELVIS W/CONTRAST: CPT

## 2024-06-27 RX ORDER — HYDROCODONE BITARTRATE AND ACETAMINOPHEN 5; 325 MG/1; MG/1
2 TABLET ORAL ONCE
Status: COMPLETED | OUTPATIENT
Start: 2024-06-27 | End: 2024-06-27

## 2024-06-27 RX ORDER — ONDANSETRON 4 MG/1
4 TABLET, ORALLY DISINTEGRATING ORAL EVERY 8 HOURS PRN
Qty: 10 TABLET | Refills: 0 | Status: SHIPPED | OUTPATIENT
Start: 2024-06-27 | End: 2024-06-27

## 2024-06-27 RX ORDER — ONDANSETRON 4 MG/1
4 TABLET, ORALLY DISINTEGRATING ORAL EVERY 8 HOURS PRN
Qty: 10 TABLET | Refills: 0 | Status: SHIPPED | OUTPATIENT
Start: 2024-06-27

## 2024-06-27 RX ORDER — 0.9 % SODIUM CHLORIDE 0.9 %
1000 INTRAVENOUS SOLUTION INTRAVENOUS ONCE
Status: COMPLETED | OUTPATIENT
Start: 2024-06-27 | End: 2024-06-27

## 2024-06-27 RX ORDER — HYDROCODONE BITARTRATE AND ACETAMINOPHEN 5; 325 MG/1; MG/1
1 TABLET ORAL EVERY 6 HOURS PRN
Qty: 5 TABLET | Refills: 0 | Status: SHIPPED | OUTPATIENT
Start: 2024-06-27 | End: 2024-06-27

## 2024-06-27 RX ORDER — MORPHINE SULFATE 4 MG/ML
4 INJECTION, SOLUTION INTRAMUSCULAR; INTRAVENOUS EVERY 30 MIN PRN
Status: DISCONTINUED | OUTPATIENT
Start: 2024-06-27 | End: 2024-06-27 | Stop reason: HOSPADM

## 2024-06-27 RX ORDER — HYDROCODONE BITARTRATE AND ACETAMINOPHEN 5; 325 MG/1; MG/1
1 TABLET ORAL EVERY 6 HOURS PRN
Qty: 5 TABLET | Refills: 0 | Status: SHIPPED | OUTPATIENT
Start: 2024-06-27 | End: 2024-06-30

## 2024-06-27 RX ADMIN — SODIUM CHLORIDE 1000 ML: 9 INJECTION, SOLUTION INTRAVENOUS at 17:03

## 2024-06-27 RX ADMIN — HYDROCODONE BITARTRATE AND ACETAMINOPHEN 2 TABLET: 5; 325 TABLET ORAL at 20:19

## 2024-06-27 RX ADMIN — MORPHINE SULFATE 4 MG: 4 INJECTION, SOLUTION INTRAMUSCULAR; INTRAVENOUS at 17:13

## 2024-06-27 RX ADMIN — IOPAMIDOL 80 ML: 755 INJECTION, SOLUTION INTRAVENOUS at 18:49

## 2024-06-27 ASSESSMENT — PAIN DESCRIPTION - ORIENTATION
ORIENTATION: LOWER
ORIENTATION: LEFT
ORIENTATION: LOWER;MID

## 2024-06-27 ASSESSMENT — PAIN - FUNCTIONAL ASSESSMENT: PAIN_FUNCTIONAL_ASSESSMENT: 0-10

## 2024-06-27 ASSESSMENT — PAIN DESCRIPTION - PAIN TYPE: TYPE: ACUTE PAIN

## 2024-06-27 ASSESSMENT — PAIN DESCRIPTION - LOCATION
LOCATION: ABDOMEN;BACK
LOCATION: ABDOMEN;BUTTOCKS
LOCATION: ABDOMEN

## 2024-06-27 ASSESSMENT — PAIN SCALES - GENERAL
PAINLEVEL_OUTOF10: 6
PAINLEVEL_OUTOF10: 6
PAINLEVEL_OUTOF10: 5
PAINLEVEL_OUTOF10: 8

## 2024-06-27 ASSESSMENT — PAIN DESCRIPTION - DESCRIPTORS: DESCRIPTORS: CRAMPING

## 2024-06-27 NOTE — ED PROVIDER NOTES
Emergency Department Encounter  Location: [facility] EMERGENCY DEPARTMENT    Patient: Skylar Edouard  MRN: [id number]  : 1985  Date of evaluation: 2024  ED Provider: Asiya Monsivais DO    Chief Complaint:    Rectal Bleeding (Started last night, dark red clots) and Diarrhea (All day yesterday)    [place]:  Skylar Edouard is a 38 y.o. female that presents to the emergency department with multiple episodes of bright red blood per rectum.  Reports onset was yesterday.  She has had multiple bowel movements consisting of blood.  Does describe generalized abdominal cramping, more prominent on the left side.  Denies any nausea or vomiting.  No fever, chills or urinary symptoms.  States she has never had a colonoscopy or prior GI bleed.  Has had abdominal hernia repair with mesh.  Denies any recent travel, hospitalization or ill contacts.  Has been swimming in her [employer] pool.  No one else has been swimming there has been ill.      Past Medical History:   Diagnosis Date    ADHD     Anxiety disorder     Arthritis     Asthma     Follows with Lizbeth    Bipolar disorder ([facility])     Not on meds as of 12/3/19    Depression     currently not on meds (12/3/19)    Drug addiction ([facility])     states previous addiction to vicodin (), denies current use.   Hx + UDS for cocaine, 12/3/2019    Dysmenorrhea     Family history of heart disease     \"had to see Dr Gee and he said every thing ok- approx 2 yrs ago \"    Genital warts     H/O echocardiogram 2018    Technically difficult examination due to body habitus. Left ventricular function is low normal, EF is estimated at 45-50%. Mild concentric left ventricular hypertrophy. Mildly dilated left atrium. Right ventricular systolic pressure of 29 mm Hg. Mild tricuspid regurgitation, no evidence of any pericardial effusion.     Headache(784.0)     Last headache:  19    History of exercise stress test 2017    treadmill           Nursing Notes Reviewed    Physical Exam:  ED Triage Vitals   Enc Vitals Group      BP 06/27/24 1350 (!) 173/92      Pulse 06/27/24 1350 86      Respirations 06/27/24 1350 16      Temp 06/27/24 1350 97.7 °F (36.5 °C)      Temp src --       SpO2 06/27/24 1350 97 %      Weight - Scale 06/27/24 1709 (!) 172.4 kg (380 lb)      Height 06/27/24 1709 1.829 m (6')      Head Circumference --       Peak Flow --       Pain Score --       Pain Loc --       Pain Edu? --       Excl. in GC? --      GENERAL APPEARANCE: Awake and alert. Cooperative.  Appears somewhat uncomfortable but nontoxic.  HEAD: Normocephalic. Atraumatic.   EYES: EOM's grossly intact. Sclera anicteric.   ENT: Tolerates saliva. No trismus.   NECK: Supple. Trachea midline.   CARDIO: RRR. Radial pulse 2+.   LUNGS: Respirations unlabored. CTAB.  ABDOMEN: Soft. Non-distended.  Tender through the left abdomen.  No rebound or guarding.  No CVA tenderness.  EXTREMITIES: No acute deformities.  No lower extremity tenderness, edema or asymmetry.  SKIN: Warm and dry.   NEUROLOGICAL: No gross facial drooping. Moves all 4 extremities spontaneously.   PSYCHIATRIC: Normal mood.     Labs:  Results for orders placed or performed during the hospital encounter of 06/27/24   CBC with Auto Differential   Result Value Ref Range    WBC 8.9 4.0 - 10.5 K/CU MM    RBC 4.68 4.2 - 5.4 M/CU MM    Hemoglobin 14.1 12.5 - 16.0 GM/DL    Hematocrit 43.9 37 - 47 %    MCV 93.8 78 - 100 FL    MCH 30.1 27 - 31 PG    MCHC 32.1 32.0 - 36.0 %    RDW 12.4 11.7 - 14.9 %    Platelets 306 140 - 440 K/CU MM    MPV 10.1 7.5 - 11.1 FL    Differential Type AUTOMATED DIFFERENTIAL     Neutrophils % 67.8 (H) 36 - 66 %    Lymphocytes % 21.4 (L) 24 - 44 %    Monocytes % 5.3 (H) 0 - 4 %    Eosinophils % 4.8 (H) 0 - 3 %    Basophils % 0.4 0 - 1 %    Neutrophils Absolute 6.0 K/CU MM    Lymphocytes Absolute 1.9 K/CU MM    Monocytes Absolute 0.5 K/CU MM    Eosinophils Absolute 0.4 K/CU MM    Basophils Absolute 0.0

## 2024-06-29 ENCOUNTER — HOSPITAL ENCOUNTER (EMERGENCY)
Age: 39
Discharge: HOME | End: 2024-06-29
Attending: EMERGENCY MEDICINE
Payer: COMMERCIAL

## 2024-06-29 VITALS
OXYGEN SATURATION: 98 % | RESPIRATION RATE: 18 BRPM | TEMPERATURE: 98.3 F | SYSTOLIC BLOOD PRESSURE: 111 MMHG | HEART RATE: 77 BPM | DIASTOLIC BLOOD PRESSURE: 63 MMHG

## 2024-06-29 DIAGNOSIS — K64.9 ACUTE HEMORRHOID: Primary | ICD-10-CM

## 2024-06-29 PROCEDURE — 99283 EMERGENCY DEPT VISIT LOW MDM: CPT

## 2024-06-29 PROCEDURE — 6370000000 HC RX 637 (ALT 250 FOR IP): Performed by: EMERGENCY MEDICINE

## 2024-06-29 RX ORDER — DOCUSATE SODIUM 100 MG/1
100 CAPSULE, LIQUID FILLED ORAL ONCE
Status: COMPLETED | OUTPATIENT
Start: 2024-06-29 | End: 2024-06-29

## 2024-06-29 RX ORDER — IBUPROFEN 600 MG/1
600 TABLET ORAL EVERY 6 HOURS PRN
Qty: 20 TABLET | Refills: 0 | Status: SHIPPED | OUTPATIENT
Start: 2024-06-29 | End: 2024-07-29

## 2024-06-29 RX ORDER — POLYETHYLENE GLYCOL 3350 17 G/17G
17 POWDER, FOR SOLUTION ORAL DAILY
Qty: 510 G | Refills: 0 | Status: SHIPPED | OUTPATIENT
Start: 2024-06-29 | End: 2024-07-29

## 2024-06-29 RX ORDER — DOCUSATE SODIUM 100 MG/1
100 CAPSULE, LIQUID FILLED ORAL 2 TIMES DAILY
Qty: 20 CAPSULE | Refills: 0 | Status: SHIPPED | OUTPATIENT
Start: 2024-06-29 | End: 2024-07-09

## 2024-06-29 RX ORDER — LIDOCAINE HYDROCHLORIDE 20 MG/ML
JELLY TOPICAL ONCE
Status: COMPLETED | OUTPATIENT
Start: 2024-06-29 | End: 2024-06-29

## 2024-06-29 RX ORDER — ONDANSETRON 4 MG/1
4 TABLET, ORALLY DISINTEGRATING ORAL ONCE
Status: COMPLETED | OUTPATIENT
Start: 2024-06-29 | End: 2024-06-29

## 2024-06-29 RX ORDER — HYDROCORTISONE 25 MG/G
CREAM TOPICAL 2 TIMES DAILY
Qty: 28 G | Refills: 0 | Status: SHIPPED | OUTPATIENT
Start: 2024-06-29

## 2024-06-29 RX ORDER — OXYCODONE HYDROCHLORIDE AND ACETAMINOPHEN 5; 325 MG/1; MG/1
1 TABLET ORAL
Status: COMPLETED | OUTPATIENT
Start: 2024-06-29 | End: 2024-06-29

## 2024-06-29 RX ADMIN — LIDOCAINE HYDROCHLORIDE: 20 JELLY TOPICAL at 00:47

## 2024-06-29 RX ADMIN — OXYCODONE HYDROCHLORIDE AND ACETAMINOPHEN 1 TABLET: 5; 325 TABLET ORAL at 00:46

## 2024-06-29 RX ADMIN — DOCUSATE SODIUM 100 MG: 100 CAPSULE, LIQUID FILLED ORAL at 00:46

## 2024-06-29 RX ADMIN — ONDANSETRON 4 MG: 4 TABLET, ORALLY DISINTEGRATING ORAL at 00:46

## 2024-06-29 ASSESSMENT — PAIN DESCRIPTION - LOCATION: LOCATION: BACK

## 2024-06-29 ASSESSMENT — PAIN SCALES - GENERAL: PAINLEVEL_OUTOF10: 10

## 2024-06-29 ASSESSMENT — PAIN DESCRIPTION - ORIENTATION: ORIENTATION: LOWER

## 2024-06-29 ASSESSMENT — PAIN DESCRIPTION - DESCRIPTORS: DESCRIPTORS: ACHING

## 2024-06-29 NOTE — ED PROVIDER NOTES
Triage Chief Complaint:    No chief complaint on file.    HPI   Skylar Edouard is a 38 y.o. female that presents for evaluation of abdominal pain.  The patient reports that she has been having rectal bleeding and pain there.  She states is been constant is not necessarily when she is having bowel movements that seems to happen spontaneously.  The patient has denied any nausea or vomiting.  No urinary symptoms.  No other areas of concern.  She has not tried thing it for symptom management.  She denies instrumentation or anal intercourse.  No concerns about possible pregnancy.  No prior history of anything like a hemorrhoid.  No prior history of GI bleeds.  No coagulopathies that she is aware of.    History from : Patient    Limitations to history : None    ROS:  10 systems reviewed and negative except as above.     Past Medical History:   Diagnosis Date    ADHD     Anxiety disorder     Arthritis     Asthma     Follows with Lizbeth    Bipolar disorder ([facility])     Not on meds as of 12/3/19    Depression     currently not on meds (12/3/19)    Drug addiction ([facility])     states previous addiction to vicodin (2011), denies current use.   Hx + UDS for cocaine, 12/3/2019    Dysmenorrhea     Family history of heart disease     \"had to see Dr Gee and he said every thing ok- approx 2 yrs ago \"    Genital warts     H/O echocardiogram 12/19/2018    Technically difficult examination due to body habitus. Left ventricular function is low normal, EF is estimated at 45-50%. Mild concentric left ventricular hypertrophy. Mildly dilated left atrium. Right ventricular systolic pressure of 29 mm Hg. Mild tricuspid regurgitation, no evidence of any pericardial effusion.     Headache(784.0)     Last headache:  12/2/19    History of exercise stress test 05/04/2017    treadmill    Irregular menses     Menorrhagia     Miscarriage     x4    Nausea and vomiting in adult 04/27/2017    Pain management     Was Dr. Barton - left his practice

## 2024-07-01 ENCOUNTER — HOSPITAL ENCOUNTER (OUTPATIENT)
Age: 39
Setting detail: SPECIMEN
Discharge: HOME | End: 2024-07-01
Payer: COMMERCIAL

## 2024-07-01 PROCEDURE — 87507 IADNA-DNA/RNA PROBE TQ 12-25: CPT

## 2024-07-01 NOTE — PROGRESS NOTES
PELVIS W IV CONTRAST CLINICAL INDICATION/HISTORY: abd pain/blood stool - ? colitis  COMPARISON: None. TECHNIQUE: Helical CT imaging of the abdomen and pelvis was performed with intravenous contrast. Multiplanar reformats were generated. One or more dose reduction techniques were used on this CT: automated exposure control, adjustment of the mAs and/or kVp according to patient size, and iterative reconstruction techniques.  FINDINGS: LOWER CHEST: Unremarkable. LIVER: Hepatic steatosis. BILIARY/GALLBLADDER: Unremarkable. SPLEEN: Unremarkable. PANCREAS: Unremarkable. ADRENAL GLANDS: Unremarkable. KIDNEYS: Unremarkable. BLADDER: Unremarkable. REPRODUCTIVE: Unremarkable. BOWEL: No bowel obstruction or inflammation. No appendicitis. GREAT VESSELS:  Unremarkable for age. LYMPH NODES: Unremarkable. BODY WALL/ MSK: Unremarkable.     No acute abdominal or pelvic findings. Electronically signed by Martin Maldonado    Pertinent laboratory and imaging studies were personally reviewed if available.    IMPRESSION:    Skylar Edouard is a 38 y.o. female with class III obesity (BMI >55), presenting with symptomatic bleeding hemorrhoids    1. Bleeding hemorrhoids    2. 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

## 2024-07-02 ENCOUNTER — OFFICE VISIT (OUTPATIENT)
Dept: SURGERY | Age: 39
End: 2024-07-02
Payer: COMMERCIAL

## 2024-07-02 VITALS
DIASTOLIC BLOOD PRESSURE: 74 MMHG | WEIGHT: 293 LBS | BODY MASS INDEX: 39.68 KG/M2 | HEIGHT: 72 IN | OXYGEN SATURATION: 98 % | SYSTOLIC BLOOD PRESSURE: 122 MMHG | HEART RATE: 87 BPM

## 2024-07-02 DIAGNOSIS — E66.01 MORBID OBESITY WITH BMI OF 50.0-59.9, ADULT (HCC): ICD-10-CM

## 2024-07-02 DIAGNOSIS — K64.9 BLEEDING HEMORRHOIDS: Primary | ICD-10-CM

## 2024-07-02 LAB
ASTROVIRUS PCR: NOT DETECTED
C CAYETANENSIS DNA SPEC QL NAA+PROBE: NOT DETECTED
CAMPY SP DNA.DIARRHEA STL QL NAA+PROBE: NOT DETECTED
CRYPTOSP DNA SPEC QL NAA+PROBE: NOT DETECTED
E COLI DNA SPEC QL NAA+PROBE: NOT DETECTED
E COLI ENTEROAGGREGATIVE PCR: NOT DETECTED
E COLI ENTEROPATHOGENIC PCR: ABNORMAL
E COLI ENTEROTOXIGENIC PCR: NOT DETECTED
E COLI O157H7 DNA SPEC QL NAA+PROBE: NOT DETECTED
E HISTOLYT DNA SPEC QL NAA+PROBE: NOT DETECTED
EC STX1+STX2 + H7 FLIC SPEC NAA+PROBE: NOT DETECTED
G LAMBLIA DNA SPEC QL NAA+PROBE: NOT DETECTED
HADV DNA SPEC QL NAA+PROBE: NOT DETECTED
NOROVIRUS RNA SPEC QL NAA+PROBE: NOT DETECTED
P SHIGELLOIDES DNA STL QL NAA+PROBE: NOT DETECTED
RV RNA SPEC QL NAA+PROBE: NOT DETECTED
SALMONELLA DNA SPEC QL NAA+PROBE: NOT DETECTED
SAPOVIRUS PCR: NOT DETECTED
V CHOLERAE DNA SPEC QL NAA+PROBE: NOT DETECTED
VIBRIO DNA SPEC NAA+PROBE: NOT DETECTED
YERSINIA DNA SPEC NAA+PROBE: NOT DETECTED

## 2024-07-02 PROCEDURE — 99203 OFFICE O/P NEW LOW 30 MIN: CPT | Performed by: SURGERY

## 2024-07-02 PROCEDURE — 4004F PT TOBACCO SCREEN RCVD TLK: CPT | Performed by: SURGERY

## 2024-07-02 PROCEDURE — G8427 DOCREV CUR MEDS BY ELIG CLIN: HCPCS | Performed by: SURGERY

## 2024-07-02 PROCEDURE — G8417 CALC BMI ABV UP PARAM F/U: HCPCS | Performed by: SURGERY

## 2024-07-02 RX ORDER — HYDROCODONE BITARTRATE AND ACETAMINOPHEN 5; 325 MG/1; MG/1
1 TABLET ORAL EVERY 6 HOURS PRN
Qty: 10 TABLET | Refills: 0 | Status: SHIPPED | OUTPATIENT
Start: 2024-07-02 | End: 2024-07-05

## 2024-07-02 RX ORDER — HYDROCORTISONE ACETATE 25 MG/1
25 SUPPOSITORY RECTAL EVERY 12 HOURS
Qty: 24 SUPPOSITORY | Refills: 0 | Status: SHIPPED | OUTPATIENT
Start: 2024-07-02

## 2024-07-02 ASSESSMENT — PATIENT HEALTH QUESTIONNAIRE - PHQ9
SUM OF ALL RESPONSES TO PHQ QUESTIONS 1-9: 0
2. FEELING DOWN, DEPRESSED OR HOPELESS: NOT AT ALL
SUM OF ALL RESPONSES TO PHQ9 QUESTIONS 1 & 2: 0
1. LITTLE INTEREST OR PLEASURE IN DOING THINGS: NOT AT ALL
SUM OF ALL RESPONSES TO PHQ QUESTIONS 1-9: 0

## 2024-07-05 ASSESSMENT — ENCOUNTER SYMPTOMS
EYE DISCHARGE: 0
ABDOMINAL DISTENTION: 0
RECTAL PAIN: 1
SORE THROAT: 0
VOMITING: 0
NAUSEA: 0
CONSTIPATION: 1
SHORTNESS OF BREATH: 0
DIARRHEA: 1
ABDOMINAL PAIN: 0
COLOR CHANGE: 0
BLOOD IN STOOL: 1
CHEST TIGHTNESS: 0
ANAL BLEEDING: 1
EYE REDNESS: 0

## 2024-12-14 ENCOUNTER — HOSPITAL ENCOUNTER (EMERGENCY)
Age: 39
Discharge: HOME | End: 2024-12-15
Payer: COMMERCIAL

## 2024-12-14 ENCOUNTER — APPOINTMENT (OUTPATIENT)
Dept: CT IMAGING | Age: 39
End: 2024-12-14
Payer: COMMERCIAL

## 2024-12-14 VITALS
HEART RATE: 84 BPM | TEMPERATURE: 98 F | RESPIRATION RATE: 17 BRPM | HEIGHT: 72 IN | SYSTOLIC BLOOD PRESSURE: 139 MMHG | OXYGEN SATURATION: 96 % | BODY MASS INDEX: 39.68 KG/M2 | DIASTOLIC BLOOD PRESSURE: 75 MMHG | WEIGHT: 293 LBS

## 2024-12-14 DIAGNOSIS — M54.50 ACUTE EXACERBATION OF CHRONIC LOW BACK PAIN: Primary | ICD-10-CM

## 2024-12-14 DIAGNOSIS — G89.29 ACUTE EXACERBATION OF CHRONIC LOW BACK PAIN: Primary | ICD-10-CM

## 2024-12-14 PROCEDURE — 6360000002 HC RX W HCPCS: Performed by: PHYSICIAN ASSISTANT

## 2024-12-14 PROCEDURE — 96372 THER/PROPH/DIAG INJ SC/IM: CPT

## 2024-12-14 PROCEDURE — 72131 CT LUMBAR SPINE W/O DYE: CPT

## 2024-12-14 PROCEDURE — 99284 EMERGENCY DEPT VISIT MOD MDM: CPT

## 2024-12-14 RX ORDER — KETOROLAC TROMETHAMINE 15 MG/ML
30 INJECTION, SOLUTION INTRAMUSCULAR; INTRAVENOUS ONCE
Status: COMPLETED | OUTPATIENT
Start: 2024-12-14 | End: 2024-12-14

## 2024-12-14 RX ORDER — ORPHENADRINE CITRATE 30 MG/ML
60 INJECTION INTRAMUSCULAR; INTRAVENOUS ONCE
Status: COMPLETED | OUTPATIENT
Start: 2024-12-14 | End: 2024-12-14

## 2024-12-14 RX ORDER — DEXAMETHASONE SODIUM PHOSPHATE 10 MG/ML
10 INJECTION, SOLUTION INTRAMUSCULAR; INTRAVENOUS ONCE
Status: COMPLETED | OUTPATIENT
Start: 2024-12-14 | End: 2024-12-14

## 2024-12-14 RX ADMIN — KETOROLAC TROMETHAMINE 30 MG: 15 INJECTION, SOLUTION INTRAMUSCULAR; INTRAVENOUS at 20:54

## 2024-12-14 RX ADMIN — DEXAMETHASONE SODIUM PHOSPHATE 10 MG: 10 INJECTION, SOLUTION INTRAMUSCULAR; INTRAVENOUS at 20:54

## 2024-12-14 RX ADMIN — ORPHENADRINE CITRATE 60 MG: 30 INJECTION INTRAMUSCULAR; INTRAVENOUS at 20:54

## 2024-12-14 ASSESSMENT — PAIN DESCRIPTION - LOCATION: LOCATION: BACK;LEG

## 2024-12-14 ASSESSMENT — LIFESTYLE VARIABLES
HOW MANY STANDARD DRINKS CONTAINING ALCOHOL DO YOU HAVE ON A TYPICAL DAY: PATIENT DOES NOT DRINK
HOW OFTEN DO YOU HAVE A DRINK CONTAINING ALCOHOL: NEVER

## 2024-12-14 ASSESSMENT — PAIN DESCRIPTION - ORIENTATION: ORIENTATION: RIGHT;LEFT

## 2024-12-14 ASSESSMENT — PAIN SCALES - GENERAL: PAINLEVEL_OUTOF10: 7

## 2024-12-14 ASSESSMENT — PAIN DESCRIPTION - DESCRIPTORS: DESCRIPTORS: ACHING;SHARP

## 2024-12-14 ASSESSMENT — PAIN - FUNCTIONAL ASSESSMENT: PAIN_FUNCTIONAL_ASSESSMENT: 0-10

## 2024-12-15 RX ORDER — LIDOCAINE 50 MG/G
1 PATCH TOPICAL DAILY
Qty: 30 PATCH | Refills: 0 | Status: SHIPPED | OUTPATIENT
Start: 2024-12-15 | End: 2025-01-14

## 2024-12-15 RX ORDER — PREDNISONE 10 MG/1
60 TABLET ORAL DAILY
Qty: 30 TABLET | Refills: 0 | Status: SHIPPED | OUTPATIENT
Start: 2024-12-15 | End: 2024-12-20

## 2024-12-15 RX ORDER — METHOCARBAMOL 500 MG/1
500 TABLET, FILM COATED ORAL 4 TIMES DAILY
Qty: 20 TABLET | Refills: 0 | Status: SHIPPED | OUTPATIENT
Start: 2024-12-15 | End: 2024-12-25

## 2024-12-15 RX ORDER — CYCLOBENZAPRINE HCL 10 MG
10 TABLET ORAL 3 TIMES DAILY PRN
Qty: 15 TABLET | Refills: 0 | Status: SHIPPED | OUTPATIENT
Start: 2024-12-15 | End: 2024-12-15

## 2024-12-15 RX ORDER — ACETAMINOPHEN 500 MG
1000 TABLET ORAL ONCE
Status: DISCONTINUED | OUTPATIENT
Start: 2024-12-15 | End: 2024-12-15 | Stop reason: HOSPADM

## 2024-12-15 ASSESSMENT — PAIN DESCRIPTION - LOCATION: LOCATION: BACK

## 2024-12-15 ASSESSMENT — PAIN SCALES - GENERAL
PAINLEVEL_OUTOF10: 0
PAINLEVEL_OUTOF10: 0

## 2024-12-15 ASSESSMENT — PAIN - FUNCTIONAL ASSESSMENT: PAIN_FUNCTIONAL_ASSESSMENT: 0-10

## 2024-12-15 NOTE — ED NOTES
Pt sleeping when this RN enters room. Pt asked to wait in lobby for dispo and discharge. Pt begins to moan, stating \"I hurt, don't you car.\" Pt updated on plan of care.

## 2024-12-15 NOTE — ED TRIAGE NOTES
Pt arrives with complaint of back pain from fall seven  months ago. Pt states she is having leg swelling since yesterday. Pt states she \"[facility].\"

## 2024-12-15 NOTE — ED PROVIDER NOTES
Rm Daniel  [id number]  [facility] [address]  [phone]            DISCHARGE MEDICATIONS:  Discharge Medication List as of 12/15/2024 12:59 AM        START taking these medications    Details   predniSONE (DELTASONE) 10 MG tablet Take 6 tablets by mouth daily for 5 days, Disp-30 tablet, R-0Print      lidocaine (LIDODERM) 5 % Place 1 patch onto the skin daily 12 hours on, 12 hours off., Disp-30 patch, R-0Print      methocarbamol (ROBAXIN) 500 MG tablet Take 1 tablet by mouth 4 times daily for 10 days, Disp-20 tablet, R-0Print             DISCONTINUED MEDICATIONS:  Discharge Medication List as of 12/15/2024 12:59 AM                 (Please note that portions ofthis note were completed with a voice recognition program.  Efforts were made to edit the dictations but occasionally words are mis-transcribed.)    Angely Dc PA-C (electronically signed)            Angely Dc PA-C  12/15/24 0227

## 2024-12-15 NOTE — ED NOTES
This RN to bedside to obtain vitals, on entry to room pt calm, scrolling on phone. When this RN stated I was at bedside for vitals pt begins wailing \"Oh my God the pain, the pain, I hurt so bad.\" This RN inquired what pain pt was complaining of, pt states \"in my back the pain is so bad.\" Pt again goes back to calm and looking at phone until Angely SCHMITZ enters room. Pt again begins to wail \"the pain, I am in so much pain, I can't quit shaking, I slid out of the chair.\" NADIR Au remains at bed side at this time speaking with pt.

## 2024-12-23 ENCOUNTER — APPOINTMENT (OUTPATIENT)
Dept: GENERAL RADIOLOGY | Age: 39
End: 2024-12-23
Payer: COMMERCIAL

## 2024-12-23 ENCOUNTER — HOSPITAL ENCOUNTER (EMERGENCY)
Age: 39
Discharge: OTHER INSTITUTION | End: 2024-12-23
Payer: COMMERCIAL

## 2024-12-23 VITALS
HEART RATE: 101 BPM | DIASTOLIC BLOOD PRESSURE: 56 MMHG | SYSTOLIC BLOOD PRESSURE: 126 MMHG | BODY MASS INDEX: 56.01 KG/M2 | RESPIRATION RATE: 22 BRPM | OXYGEN SATURATION: 97 % | WEIGHT: 293 LBS | TEMPERATURE: 98.3 F

## 2024-12-23 DIAGNOSIS — J06.9 ACUTE UPPER RESPIRATORY INFECTION: Primary | ICD-10-CM

## 2024-12-23 DIAGNOSIS — J45.901 EXACERBATION OF PERSISTENT ASTHMA, UNSPECIFIED ASTHMA SEVERITY: ICD-10-CM

## 2024-12-23 DIAGNOSIS — Z59.00 HOMELESSNESS: ICD-10-CM

## 2024-12-23 LAB
INFLUENZA A BY PCR: NOT DETECTED
INFLUENZA B BY PCR: NOT DETECTED
SARS-COV-2 RDRP RESP QL NAA+PROBE: NOT DETECTED
SPECIMEN DESCRIPTION: NORMAL

## 2024-12-23 PROCEDURE — 94640 AIRWAY INHALATION TREATMENT: CPT

## 2024-12-23 PROCEDURE — 96372 THER/PROPH/DIAG INJ SC/IM: CPT

## 2024-12-23 PROCEDURE — 94664 DEMO&/EVAL PT USE INHALER: CPT

## 2024-12-23 PROCEDURE — 6370000000 HC RX 637 (ALT 250 FOR IP): Performed by: NURSE PRACTITIONER

## 2024-12-23 PROCEDURE — 71045 X-RAY EXAM CHEST 1 VIEW: CPT

## 2024-12-23 PROCEDURE — 99284 EMERGENCY DEPT VISIT MOD MDM: CPT

## 2024-12-23 PROCEDURE — 87502 INFLUENZA DNA AMP PROBE: CPT

## 2024-12-23 PROCEDURE — 6360000002 HC RX W HCPCS: Performed by: NURSE PRACTITIONER

## 2024-12-23 PROCEDURE — 87635 SARS-COV-2 COVID-19 AMP PRB: CPT

## 2024-12-23 RX ORDER — BENZONATATE 200 MG/1
200 CAPSULE ORAL 3 TIMES DAILY PRN
Qty: 30 CAPSULE | Refills: 0 | Status: SHIPPED | OUTPATIENT
Start: 2024-12-23 | End: 2024-12-30

## 2024-12-23 RX ORDER — DEXAMETHASONE SODIUM PHOSPHATE 10 MG/ML
10 INJECTION, SOLUTION INTRAMUSCULAR; INTRAVENOUS ONCE
Status: COMPLETED | OUTPATIENT
Start: 2024-12-23 | End: 2024-12-23

## 2024-12-23 RX ORDER — KETOROLAC TROMETHAMINE 15 MG/ML
30 INJECTION, SOLUTION INTRAMUSCULAR; INTRAVENOUS ONCE
Status: COMPLETED | OUTPATIENT
Start: 2024-12-23 | End: 2024-12-23

## 2024-12-23 RX ORDER — ONDANSETRON 4 MG/1
4 TABLET, ORALLY DISINTEGRATING ORAL ONCE
Status: COMPLETED | OUTPATIENT
Start: 2024-12-23 | End: 2024-12-23

## 2024-12-23 RX ORDER — ALBUTEROL SULFATE 90 UG/1
2 INHALANT RESPIRATORY (INHALATION) 4 TIMES DAILY PRN
Qty: 54 G | Refills: 1 | Status: SHIPPED | OUTPATIENT
Start: 2024-12-23

## 2024-12-23 RX ORDER — BENZONATATE 100 MG/1
200 CAPSULE ORAL ONCE
Status: COMPLETED | OUTPATIENT
Start: 2024-12-23 | End: 2024-12-23

## 2024-12-23 RX ORDER — ONDANSETRON 4 MG/1
4 TABLET, ORALLY DISINTEGRATING ORAL 3 TIMES DAILY PRN
Qty: 21 TABLET | Refills: 0 | Status: SHIPPED | OUTPATIENT
Start: 2024-12-23

## 2024-12-23 RX ORDER — GUAIFENESIN 600 MG/1
600 TABLET, EXTENDED RELEASE ORAL 2 TIMES DAILY
Qty: 30 TABLET | Refills: 0 | Status: SHIPPED | OUTPATIENT
Start: 2024-12-23 | End: 2025-01-07

## 2024-12-23 RX ORDER — IPRATROPIUM BROMIDE AND ALBUTEROL SULFATE 2.5; .5 MG/3ML; MG/3ML
1 SOLUTION RESPIRATORY (INHALATION) ONCE
Status: COMPLETED | OUTPATIENT
Start: 2024-12-23 | End: 2024-12-23

## 2024-12-23 RX ORDER — PREDNISONE 20 MG/1
40 TABLET ORAL DAILY
Qty: 10 TABLET | Refills: 0 | Status: SHIPPED | OUTPATIENT
Start: 2024-12-23 | End: 2024-12-28

## 2024-12-23 RX ADMIN — DEXAMETHASONE SODIUM PHOSPHATE 10 MG: 10 INJECTION INTRAMUSCULAR; INTRAVENOUS at 21:55

## 2024-12-23 RX ADMIN — BENZONATATE 200 MG: 100 CAPSULE ORAL at 21:55

## 2024-12-23 RX ADMIN — KETOROLAC TROMETHAMINE 30 MG: 15 INJECTION, SOLUTION INTRAMUSCULAR; INTRAVENOUS at 21:55

## 2024-12-23 RX ADMIN — ONDANSETRON 4 MG: 4 TABLET, ORALLY DISINTEGRATING ORAL at 23:11

## 2024-12-23 RX ADMIN — IPRATROPIUM BROMIDE AND ALBUTEROL SULFATE 1 DOSE: .5; 2.5 SOLUTION RESPIRATORY (INHALATION) at 22:40

## 2024-12-23 SDOH — ECONOMIC STABILITY - HOUSING INSECURITY: HOMELESSNESS UNSPECIFIED: Z59.00

## 2024-12-23 ASSESSMENT — PAIN - FUNCTIONAL ASSESSMENT
PAIN_FUNCTIONAL_ASSESSMENT: 0-10
PAIN_FUNCTIONAL_ASSESSMENT: 0-10

## 2024-12-23 ASSESSMENT — PAIN SCALES - GENERAL
PAINLEVEL_OUTOF10: 7
PAINLEVEL_OUTOF10: 7
PAINLEVEL_OUTOF10: 6
PAINLEVEL_OUTOF10: 7

## 2024-12-23 ASSESSMENT — PAIN DESCRIPTION - LOCATION
LOCATION: CHEST
LOCATION: BACK;CHEST
LOCATION: HEAD

## 2024-12-23 ASSESSMENT — PAIN DESCRIPTION - ORIENTATION: ORIENTATION: RIGHT

## 2024-12-24 NOTE — ED PROVIDER NOTES
Smokeless tobacco: Never   Vaping Use    Vaping status: Never Used   Substance and Sexual Activity    Alcohol use: Yes     Comment: occ    Drug use: Not Currently     Types: Marijuana (Weed)     Comment: Last used: 2005/ per pt on 10/1/2020\"last used cocaine 2019    Sexual activity: Yes     Partners: Male     Social Determinants of Health     Financial Resource Strain: Low Risk  (7/13/2023)    Overall Financial Resource Strain (CARDIA)     Difficulty of Paying Living Expenses: Not hard at all   Transportation Needs: Unmet Transportation Needs (7/13/2023)    PRAPARE - Transportation     Lack of Transportation (Non-Medical): Yes   Housing Stability: Unknown (7/13/2023)    Housing Stability Vital Sign     Unstable Housing in the Last Year: No       SCREENINGS    Karl Coma Scale  Eye Opening: Spontaneous  Best Verbal Response: Oriented  Best Motor Response: Obeys commands  [place] Coma Scale Score: 15      PHYSICAL EXAM       ED Triage Vitals [12/23/24 2120]   BP Systolic BP Percentile Diastolic BP Percentile Temp Temp Source Pulse Respirations SpO2   122/66 -- -- 98.3 °F (36.8 °C) Oral (!) 102 18 93 %      Height Weight         -- --              Constitutional:  Well developed, Well nourished.  No distress  HENT:  Normocephalic, Atraumatic.  Moist mucus membranes.  No posterior oropharynx erythema or edema  Neck/Lymphatics: supple, no swollen nodes  Cardiovascular:   RRR,  no murmurs/rubs/gallops.    Respiratory:   Nonlabored breathing.  Diminished throughout but otherwise clear.  Frequent reactive coughing  Abdomen: Obese bowel sounds normal, Soft, No tenderness, no masses.    DIAGNOSTIC RESULTS   LABS:    Labs Reviewed   COVID-19, RAPID   INFLUENZA A + B, PCR       When ordered, only abnormal lab results are displayed. All other labs were within normal range or not returned as of this dictation.    RADIOLOGY:   Non-plain film images such as CT, Ultrasound and MRI are read by the radiologist. Plain radiographic

## 2025-01-10 ENCOUNTER — HOSPITAL ENCOUNTER (EMERGENCY)
Age: 40
Discharge: HOME | End: 2025-01-10
Payer: COMMERCIAL

## 2025-01-10 ENCOUNTER — APPOINTMENT (OUTPATIENT)
Dept: GENERAL RADIOLOGY | Age: 40
End: 2025-01-10
Payer: COMMERCIAL

## 2025-01-10 ENCOUNTER — APPOINTMENT (OUTPATIENT)
Dept: ULTRASOUND IMAGING | Age: 40
End: 2025-01-10
Payer: COMMERCIAL

## 2025-01-10 VITALS
SYSTOLIC BLOOD PRESSURE: 141 MMHG | HEART RATE: 85 BPM | RESPIRATION RATE: 20 BRPM | OXYGEN SATURATION: 98 % | DIASTOLIC BLOOD PRESSURE: 58 MMHG | TEMPERATURE: 98.6 F

## 2025-01-10 DIAGNOSIS — R07.9 CHEST PAIN, UNSPECIFIED TYPE: Primary | ICD-10-CM

## 2025-01-10 DIAGNOSIS — R73.9 HYPERGLYCEMIA: ICD-10-CM

## 2025-01-10 DIAGNOSIS — M79.605 LEFT LEG PAIN: ICD-10-CM

## 2025-01-10 LAB
ALBUMIN SERPL-MCNC: 3.8 G/DL (ref 3.4–5)
ALBUMIN/GLOB SERPL: 1.5 {RATIO} (ref 1.1–2.2)
ALP SERPL-CCNC: 75 U/L (ref 40–129)
ALT SERPL-CCNC: 35 U/L (ref 10–40)
ANION GAP SERPL CALCULATED.3IONS-SCNC: 11 MMOL/L (ref 9–17)
AST SERPL-CCNC: 20 U/L (ref 15–37)
B-HCG SERPL EIA 3RD IS-ACNC: <1 MIU/ML
BASOPHILS # BLD: 0.05 K/UL
BASOPHILS NFR BLD: 0 % (ref 0–1)
BILIRUB SERPL-MCNC: 0.2 MG/DL (ref 0–1)
BUN SERPL-MCNC: 13 MG/DL (ref 7–20)
CALCIUM SERPL-MCNC: 9.4 MG/DL (ref 8.3–10.6)
CHLORIDE SERPL-SCNC: 97 MMOL/L (ref 99–110)
CO2 SERPL-SCNC: 26 MMOL/L (ref 21–32)
CREAT SERPL-MCNC: 0.7 MG/DL (ref 0.6–1.1)
EOSINOPHIL # BLD: 0.45 K/UL
EOSINOPHILS RELATIVE PERCENT: 4 % (ref 0–3)
ERYTHROCYTE [DISTWIDTH] IN BLOOD BY AUTOMATED COUNT: 13.1 % (ref 11.7–14.9)
GFR, ESTIMATED: >90 ML/MIN/1.73M2
GLUCOSE SERPL-MCNC: 340 MG/DL (ref 74–99)
HCT VFR BLD AUTO: 44.1 % (ref 37–47)
HGB BLD-MCNC: 14.3 G/DL (ref 12.5–16)
IMM GRANULOCYTES # BLD AUTO: 0.04 K/UL
IMM GRANULOCYTES NFR BLD: 0 %
LYMPHOCYTES NFR BLD: 2.41 K/UL
LYMPHOCYTES RELATIVE PERCENT: 20 % (ref 24–44)
MCH RBC QN AUTO: 30.2 PG (ref 27–31)
MCHC RBC AUTO-ENTMCNC: 32.4 G/DL (ref 32–36)
MCV RBC AUTO: 93 FL (ref 78–100)
MONOCYTES NFR BLD: 0.61 K/UL
MONOCYTES NFR BLD: 5 % (ref 0–4)
NEUTROPHILS NFR BLD: 70 % (ref 36–66)
NEUTS SEG NFR BLD: 8.29 K/UL
PLATELET # BLD AUTO: 261 K/UL (ref 140–440)
PMV BLD AUTO: 9.9 FL (ref 7.5–11.1)
POTASSIUM SERPL-SCNC: 4.3 MMOL/L (ref 3.5–5.1)
PROT SERPL-MCNC: 6.2 G/DL (ref 6.4–8.2)
RBC # BLD AUTO: 4.74 M/UL (ref 4.2–5.4)
SODIUM SERPL-SCNC: 133 MMOL/L (ref 136–145)
TROPONIN I SERPL HS-MCNC: <6 NG/L (ref 0–14)
WBC OTHER # BLD: 11.9 K/UL (ref 4–10.5)

## 2025-01-10 PROCEDURE — 6360000002 HC RX W HCPCS: Performed by: PHYSICIAN ASSISTANT

## 2025-01-10 PROCEDURE — 93971 EXTREMITY STUDY: CPT

## 2025-01-10 PROCEDURE — 99285 EMERGENCY DEPT VISIT HI MDM: CPT

## 2025-01-10 PROCEDURE — 93005 ELECTROCARDIOGRAM TRACING: CPT | Performed by: PHYSICIAN ASSISTANT

## 2025-01-10 PROCEDURE — 71045 X-RAY EXAM CHEST 1 VIEW: CPT

## 2025-01-10 PROCEDURE — 84484 ASSAY OF TROPONIN QUANT: CPT

## 2025-01-10 PROCEDURE — 80053 COMPREHEN METABOLIC PANEL: CPT

## 2025-01-10 PROCEDURE — 96375 TX/PRO/DX INJ NEW DRUG ADDON: CPT

## 2025-01-10 PROCEDURE — 84702 CHORIONIC GONADOTROPIN TEST: CPT

## 2025-01-10 PROCEDURE — 85025 COMPLETE CBC W/AUTO DIFF WBC: CPT

## 2025-01-10 PROCEDURE — 96374 THER/PROPH/DIAG INJ IV PUSH: CPT

## 2025-01-10 RX ORDER — DICLOFENAC SODIUM 75 MG/1
75 TABLET, DELAYED RELEASE ORAL 2 TIMES DAILY PRN
Qty: 20 TABLET | Refills: 0 | Status: SHIPPED | OUTPATIENT
Start: 2025-01-10

## 2025-01-10 RX ORDER — KETOROLAC TROMETHAMINE 15 MG/ML
30 INJECTION, SOLUTION INTRAMUSCULAR; INTRAVENOUS ONCE
Status: COMPLETED | OUTPATIENT
Start: 2025-01-10 | End: 2025-01-10

## 2025-01-10 RX ORDER — LORAZEPAM 2 MG/ML
1 INJECTION INTRAMUSCULAR ONCE
Status: COMPLETED | OUTPATIENT
Start: 2025-01-10 | End: 2025-01-10

## 2025-01-10 RX ADMIN — LORAZEPAM 1 MG: 2 INJECTION INTRAMUSCULAR; INTRAVENOUS at 22:36

## 2025-01-10 RX ADMIN — KETOROLAC TROMETHAMINE 30 MG: 15 INJECTION, SOLUTION INTRAMUSCULAR; INTRAVENOUS at 23:29

## 2025-01-10 ASSESSMENT — PAIN - FUNCTIONAL ASSESSMENT: PAIN_FUNCTIONAL_ASSESSMENT: NONE - DENIES PAIN

## 2025-01-10 ASSESSMENT — PAIN SCALES - GENERAL: PAINLEVEL_OUTOF10: 8

## 2025-01-11 LAB
EKG ATRIAL RATE: 87 BPM
EKG DIAGNOSIS: NORMAL
EKG P AXIS: 11 DEGREES
EKG P-R INTERVAL: 168 MS
EKG Q-T INTERVAL: 344 MS
EKG QRS DURATION: 88 MS
EKG QTC CALCULATION (BAZETT): 413 MS
EKG R AXIS: 24 DEGREES
EKG T AXIS: 59 DEGREES
EKG VENTRICULAR RATE: 87 BPM

## 2025-01-11 PROCEDURE — 93010 ELECTROCARDIOGRAM REPORT: CPT | Performed by: INTERNAL MEDICINE

## 2025-01-11 NOTE — ED NOTES
Pt educated on follow up and discharge information. VSS. IV removed. Ambulated to lobby to await her ride

## 2025-01-11 NOTE — ED PROVIDER NOTES
sepsis or septic shock?   No   Exclusion criteria - the patient is NOT to be included for SEP-1 Core Measure due to:  Infection is not suspected    Chronic Conditions affecting care:    has a past medical history of ADHD, Anxiety disorder, Arthritis, Asthma, Bipolar disorder (HCC), Depression, Drug addiction (HCC), Dysmenorrhea, Family history of heart disease, Genital warts, H/O echocardiogram (12/19/2018), Headache(784.0), History of exercise stress test (05/04/2017), Irregular menses, Menorrhagia, Miscarriage, Nausea and vomiting in adult (04/27/2017), Pain management, Prolonged emergence from general anesthesia, Skin tag, Sleep apnea, Vulvar lesion, and Wears glasses.    CONSULTS: (Who and What was discussed)  None      Social Determinants : Patient has significant healthcare illiteracy    Records Reviewed (Source):     CC/HPI Summary, DDx, ED Course, and Reassessment:   Skylar Edouard is a 39 y.o. female who presents with lower left leg pain as well as CP. Pt states that for the last 10 days she has had left calf pain that has expanded to her left thigh and hip for the last 2 days. Pt states that she is having pain with movement of her leg, as well as a palpable \"knot\" on the lateral aspect of her knee region. Pt states that she has also had a cough for the last month, and has CP that is central and generalized beneath the sternum. Pt states that she called her PCP to be seen for her \"clint-horses\" but the PCP told her to come to the ER to be evaluated for blood clots.    Pt NVI on exam, no signs of infection. US negative for dvt, limited by habitus, pt instructed to f/u outpatient for repeat in a few days. EKG non emergent, troponin negative, doubt ACS, heart score of 3. CXR negative. Labs non emergent. Hyperglycemia without DKA, pt instructed to drink water, avoid carbs, follow up with primary care.  Low concern for PE with CP with cough x one month, likely bronchitis.     Disposition Considerations (tests

## 2025-06-15 ENCOUNTER — APPOINTMENT (OUTPATIENT)
Dept: CT IMAGING | Age: 40
End: 2025-06-15
Payer: COMMERCIAL

## 2025-06-15 ENCOUNTER — APPOINTMENT (OUTPATIENT)
Dept: GENERAL RADIOLOGY | Age: 40
End: 2025-06-15
Payer: COMMERCIAL

## 2025-06-15 ENCOUNTER — HOSPITAL ENCOUNTER (EMERGENCY)
Age: 40
Discharge: HOME | End: 2025-06-15
Payer: COMMERCIAL

## 2025-06-15 VITALS
HEART RATE: 67 BPM | HEIGHT: 72 IN | OXYGEN SATURATION: 98 % | TEMPERATURE: 98.4 F | SYSTOLIC BLOOD PRESSURE: 123 MMHG | RESPIRATION RATE: 19 BRPM | BODY MASS INDEX: 39.68 KG/M2 | WEIGHT: 293 LBS | DIASTOLIC BLOOD PRESSURE: 84 MMHG

## 2025-06-15 DIAGNOSIS — J44.1 ACUTE EXACERBATION OF CHRONIC OBSTRUCTIVE PULMONARY DISEASE (HCC): ICD-10-CM

## 2025-06-15 DIAGNOSIS — R07.9 CHEST PAIN, UNSPECIFIED TYPE: Primary | ICD-10-CM

## 2025-06-15 LAB
ALBUMIN SERPL-MCNC: 3.7 G/DL (ref 3.4–5)
ALBUMIN/GLOB SERPL: 1.7 {RATIO} (ref 1.1–2.2)
ALP SERPL-CCNC: 67 U/L (ref 40–129)
ALT SERPL-CCNC: 20 U/L (ref 10–40)
ANION GAP SERPL CALCULATED.3IONS-SCNC: 10 MMOL/L (ref 9–17)
AST SERPL-CCNC: 25 U/L (ref 15–37)
BILIRUB SERPL-MCNC: 0.5 MG/DL (ref 0–1)
BUN SERPL-MCNC: 9 MG/DL (ref 7–20)
CALCIUM SERPL-MCNC: 8.8 MG/DL (ref 8.3–10.6)
CHLORIDE SERPL-SCNC: 103 MMOL/L (ref 99–110)
CO2 SERPL-SCNC: 27 MMOL/L (ref 21–32)
CREAT SERPL-MCNC: 0.7 MG/DL (ref 0.6–1.1)
ERYTHROCYTE [DISTWIDTH] IN BLOOD BY AUTOMATED COUNT: 12 % (ref 11.7–14.9)
GFR, ESTIMATED: >90 ML/MIN/1.73M2
GLUCOSE SERPL-MCNC: 93 MG/DL (ref 74–99)
HCT VFR BLD AUTO: 39.2 % (ref 37–47)
HGB BLD-MCNC: 12.4 G/DL (ref 12.5–16)
LIPASE SERPL-CCNC: 12 U/L (ref 13–60)
MCH RBC QN AUTO: 29.7 PG (ref 27–31)
MCHC RBC AUTO-ENTMCNC: 31.6 G/DL (ref 32–36)
MCV RBC AUTO: 94 FL (ref 78–100)
PLATELET # BLD AUTO: 259 K/UL (ref 140–440)
PMV BLD AUTO: 10.1 FL (ref 7.5–11.1)
POTASSIUM SERPL-SCNC: 4.4 MMOL/L (ref 3.5–5.1)
PROT SERPL-MCNC: 6 G/DL (ref 6.4–8.2)
RBC # BLD AUTO: 4.17 M/UL (ref 4.2–5.4)
SODIUM SERPL-SCNC: 139 MMOL/L (ref 136–145)
TROPONIN I SERPL HS-MCNC: 8 NG/L (ref 0–14)
TROPONIN I SERPL HS-MCNC: 8 NG/L (ref 0–14)
WBC OTHER # BLD: 7.9 K/UL (ref 4–10.5)

## 2025-06-15 PROCEDURE — 80053 COMPREHEN METABOLIC PANEL: CPT

## 2025-06-15 PROCEDURE — 6370000000 HC RX 637 (ALT 250 FOR IP)

## 2025-06-15 PROCEDURE — 6360000002 HC RX W HCPCS

## 2025-06-15 PROCEDURE — 85027 COMPLETE CBC AUTOMATED: CPT

## 2025-06-15 PROCEDURE — 99285 EMERGENCY DEPT VISIT HI MDM: CPT

## 2025-06-15 PROCEDURE — 93005 ELECTROCARDIOGRAM TRACING: CPT

## 2025-06-15 PROCEDURE — 94640 AIRWAY INHALATION TREATMENT: CPT

## 2025-06-15 PROCEDURE — 71045 X-RAY EXAM CHEST 1 VIEW: CPT

## 2025-06-15 PROCEDURE — 84484 ASSAY OF TROPONIN QUANT: CPT

## 2025-06-15 PROCEDURE — 70450 CT HEAD/BRAIN W/O DYE: CPT

## 2025-06-15 PROCEDURE — 83690 ASSAY OF LIPASE: CPT

## 2025-06-15 PROCEDURE — 96374 THER/PROPH/DIAG INJ IV PUSH: CPT

## 2025-06-15 RX ORDER — PREDNISONE 20 MG/1
60 TABLET ORAL ONCE
Status: COMPLETED | OUTPATIENT
Start: 2025-06-15 | End: 2025-06-15

## 2025-06-15 RX ORDER — BROMPHENIRAMINE MALEATE, PSEUDOEPHEDRINE HYDROCHLORIDE, AND DEXTROMETHORPHAN HYDROBROMIDE 2; 30; 10 MG/5ML; MG/5ML; MG/5ML
5 SYRUP ORAL 4 TIMES DAILY PRN
Qty: 473 ML | Refills: 0 | Status: SHIPPED | OUTPATIENT
Start: 2025-06-15

## 2025-06-15 RX ORDER — IPRATROPIUM BROMIDE AND ALBUTEROL SULFATE 2.5; .5 MG/3ML; MG/3ML
1 SOLUTION RESPIRATORY (INHALATION) ONCE
Status: COMPLETED | OUTPATIENT
Start: 2025-06-15 | End: 2025-06-15

## 2025-06-15 RX ORDER — ONDANSETRON 4 MG/1
4 TABLET, ORALLY DISINTEGRATING ORAL ONCE
Status: COMPLETED | OUTPATIENT
Start: 2025-06-15 | End: 2025-06-15

## 2025-06-15 RX ORDER — IBUPROFEN 600 MG/1
600 TABLET, FILM COATED ORAL 3 TIMES DAILY PRN
Qty: 30 TABLET | Refills: 0 | Status: SHIPPED | OUTPATIENT
Start: 2025-06-15

## 2025-06-15 RX ORDER — ASPIRIN 81 MG/1
TABLET, CHEWABLE ORAL
Status: COMPLETED
Start: 2025-06-15 | End: 2025-06-15

## 2025-06-15 RX ORDER — PREDNISONE 50 MG/1
50 TABLET ORAL DAILY
Qty: 5 TABLET | Refills: 0 | Status: SHIPPED | OUTPATIENT
Start: 2025-06-15 | End: 2025-06-20

## 2025-06-15 RX ORDER — AZITHROMYCIN 500 MG/1
500 TABLET, FILM COATED ORAL DAILY
Qty: 3 TABLET | Refills: 0 | Status: SHIPPED | OUTPATIENT
Start: 2025-06-15 | End: 2025-06-18

## 2025-06-15 RX ORDER — NITROGLYCERIN 0.4 MG/1
TABLET SUBLINGUAL
Status: COMPLETED
Start: 2025-06-15 | End: 2025-06-15

## 2025-06-15 RX ORDER — ALBUTEROL SULFATE 90 UG/1
2 INHALANT RESPIRATORY (INHALATION) 4 TIMES DAILY PRN
Qty: 54 G | Refills: 1 | Status: SHIPPED | OUTPATIENT
Start: 2025-06-15

## 2025-06-15 RX ORDER — KETOROLAC TROMETHAMINE 15 MG/ML
15 INJECTION, SOLUTION INTRAMUSCULAR; INTRAVENOUS ONCE
Status: COMPLETED | OUTPATIENT
Start: 2025-06-15 | End: 2025-06-15

## 2025-06-15 RX ADMIN — ASPIRIN 324 MG: 81 TABLET, CHEWABLE ORAL at 18:35

## 2025-06-15 RX ADMIN — PREDNISONE 60 MG: 20 TABLET ORAL at 18:36

## 2025-06-15 RX ADMIN — KETOROLAC TROMETHAMINE 15 MG: 15 INJECTION, SOLUTION INTRAMUSCULAR; INTRAVENOUS at 20:05

## 2025-06-15 RX ADMIN — ONDANSETRON 4 MG: 4 TABLET, ORALLY DISINTEGRATING ORAL at 18:36

## 2025-06-15 RX ADMIN — NITROGLYCERIN: 0.4 TABLET SUBLINGUAL at 18:35

## 2025-06-15 RX ADMIN — IPRATROPIUM BROMIDE AND ALBUTEROL SULFATE 1 DOSE: .5; 2.5 SOLUTION RESPIRATORY (INHALATION) at 18:44

## 2025-06-15 ASSESSMENT — HEART SCORE: ECG: NORMAL

## 2025-06-15 ASSESSMENT — PAIN SCALES - GENERAL
PAINLEVEL_OUTOF10: 0
PAINLEVEL_OUTOF10: 8
PAINLEVEL_OUTOF10: 10

## 2025-06-15 ASSESSMENT — LIFESTYLE VARIABLES
HOW OFTEN DO YOU HAVE A DRINK CONTAINING ALCOHOL: NEVER
HOW MANY STANDARD DRINKS CONTAINING ALCOHOL DO YOU HAVE ON A TYPICAL DAY: PATIENT DOES NOT DRINK

## 2025-06-15 ASSESSMENT — PAIN - FUNCTIONAL ASSESSMENT
PAIN_FUNCTIONAL_ASSESSMENT: 0-10
PAIN_FUNCTIONAL_ASSESSMENT: 0-10

## 2025-06-15 ASSESSMENT — PAIN DESCRIPTION - LOCATION
LOCATION: CHEST
LOCATION: CHEST

## 2025-06-15 NOTE — ED TRIAGE NOTES
Patient presents to the ED via ambulance from work   Chief complaint: chest pain   Initial vital signs:   Vitals:    06/15/25 1738   BP: (!) 140/70   Pulse: 84   Resp: 18   Temp: 98.4 °F (36.9 °C)   SpO2: 95%      Patient reports having chest pain yesterday, it subsided, then increased today at work.   Patient rates pain as 10 on a scale of 0-10, described as tightness, crushing, heaviness. Pain is located at chest.   EMS administered 1 nitro, and 324 mg aspirin

## 2025-06-15 NOTE — ED NOTES
The following labs were labeled with appropriate pt sticker and tubed to lab:     [] Blue     [] Lavender   [] on ice  [x] Green/yellow x2  [] Green/black [] on ice  [] Grey  [] on ice  [] Yellow  [] Red  [] Pink  [] Type/ Screen  [] ABG  [] VBG    [] COVID-19 swab    [] Rapid  [] PCR  [] Flu swab  [] Peds Viral Panel     [] Urine Sample  [] Fecal Sample  [] Pelvic Cultures  [] Blood Cultures  [] X 2  [] STREP Cultures  [] Wound Cultures

## 2025-06-15 NOTE — ED NOTES
The following labs were labeled with appropriate pt sticker and tubed to lab:     [x] Blue     [x] Lavender   [] on ice  [x] Green/yellow c2  [] Green/black [] on ice  [] Grey  [] on ice  [] Yellow  [x] Red  [] Pink  [] Type/ Screen  [] ABG  [] VBG    [] COVID-19 swab    [] Rapid  [] PCR  [] Flu swab  [] Peds Viral Panel     [] Urine Sample  [] Fecal Sample  [] Pelvic Cultures  [] Blood Cultures  [] X 2  [] STREP Cultures  [] Wound Cultures

## 2025-06-16 LAB
EKG ATRIAL RATE: 72 BPM
EKG DIAGNOSIS: NORMAL
EKG P AXIS: 0 DEGREES
EKG P-R INTERVAL: 176 MS
EKG Q-T INTERVAL: 380 MS
EKG QRS DURATION: 82 MS
EKG QTC CALCULATION (BAZETT): 416 MS
EKG R AXIS: 25 DEGREES
EKG T AXIS: 27 DEGREES
EKG VENTRICULAR RATE: 72 BPM

## 2025-06-16 PROCEDURE — 93010 ELECTROCARDIOGRAM REPORT: CPT | Performed by: INTERNAL MEDICINE

## 2025-06-16 NOTE — ED PROVIDER NOTES
ED attending EKG interpretation (otherwise did not participate in the care of this patient)    EKG Interpretation  Interpreted by me  Compared to 1/10/2025  Rhythm: normal sinus   Rate: normal 72  Axis: normal  Ectopy: none  Conduction: normal  ST Segments: no acute change  T Waves: no acute change  Clinical Impression: normal sinus rhythm     Marianne Guevara MD  06/15/25 2056    
motion. No rigidity.      Right lower leg: No edema.      Left lower leg: No edema.   Skin:     General: Skin is warm and dry.      Capillary Refill: Capillary refill takes less than 2 seconds.   Neurological:      General: No focal deficit present.      Mental Status: She is alert and oriented to person, place, and time.      Cranial Nerves: No cranial nerve deficit.      Motor: No weakness.      Gait: Gait normal.      Comments: NIH is 0 test -12 swelling intact   Psychiatric:         Mood and Affect: Mood normal.         Behavior: Behavior normal.           DIAGNOSTIC RESULTS   LABS:    Labs Reviewed   CBC - Abnormal; Notable for the following components:       Result Value    RBC 4.17 (*)     Hemoglobin 12.4 (*)     MCHC 31.6 (*)     All other components within normal limits   COMPREHENSIVE METABOLIC PANEL - Abnormal; Notable for the following components:    Total Protein 6.0 (*)     All other components within normal limits   LIPASE - Abnormal; Notable for the following components:    Lipase 12 (*)     All other components within normal limits   TROPONIN   TROPONIN       When ordered only abnormal lab results are displayed. All other labs were within normal range or not returned as of this dictation.    EKG: When ordered, EKG's are interpreted by the Emergency Department Physician in the absence of a cardiologist.  Please see their note for interpretation of EKG.    RADIOLOGY:   Non-plain film images such as CT, Ultrasound and MRI are read by the radiologist.     Chest x-ray independently interpreted by me without acute findings      Interpretation per the Radiologist below, if available at the time of this note:    CT HEAD WO CONTRAST   Final Result      XR CHEST PORTABLE   Final Result        No results found.      ED Provider US Interpretation.    No results found.      EMERGENCY DEPARTMENT COURSE and DIFFERENTIAL DIAGNOSIS/MDM:   Vitals:    Vitals:    06/15/25 1830 06/15/25 1844 06/15/25 1900 06/15/25 1915

## (undated) DEVICE — SUTURE 1 STRATAFIX SYMMETRIC PDS + 30CM CT-1 SXPP1A435

## (undated) DEVICE — 3M™ IOBAN™ 2 ANTIMICROBIAL INCISE DRAPE 6650EZ: Brand: IOBAN™ 2

## (undated) DEVICE — GLOVE ORANGE PI 7 1/2   MSG9075

## (undated) DEVICE — SUTURE VCRL SZ 4-0 L18IN ABSRB UD L19MM PS-2 3/8 CIR PRIM J496H

## (undated) DEVICE — ARM DRAPE

## (undated) DEVICE — SUTURE SZ 0 27IN 5/8 CIR UR-6  TAPER PT VIOLET ABSRB VICRYL J603H

## (undated) DEVICE — FORCEPS BX L240CM JAW DIA2.8MM L CAP W/ NDL MIC MESH TOOTH

## (undated) DEVICE — SOLUTION IV IRRIG WATER 1000ML POUR BRL 2F7114

## (undated) DEVICE — SUTURE STRATAFIX SYMMETRIC SZ 1 L18IN ABSRB VLT CT1 L36CM SXPP1A404

## (undated) DEVICE — COLUMN DRAPE

## (undated) DEVICE — ANESTHESIA CIRCUIT ADULT-LF: Brand: MEDLINE INDUSTRIES, INC.

## (undated) DEVICE — GLOVE SURG SZ 7 L12IN THK7.5MIL DK GRN LTX FREE MSG6570] MEDLINE INDUSTRIES INC]

## (undated) DEVICE — ELECTRODE ES AD CRDLSS PT RET REM POLYHESIVE

## (undated) DEVICE — SUTURE ETHBND EXCEL SZ 0 L30IN NONABSORBABLE GRN CT1 L36MM X424H

## (undated) DEVICE — GOWN,SIRUS,POLYRNF,BRTHSLV,XLN/XL,20/CS: Brand: MEDLINE

## (undated) DEVICE — SKIN AFFIX SURG ADHESIVE 72/CS 0.55ML: Brand: MEDLINE

## (undated) DEVICE — POSITIONER,HEAD,RING CUSHION,9IN,32CS: Brand: MEDLINE

## (undated) DEVICE — TROCAR: Brand: KII FIOS FIRST ENTRY

## (undated) DEVICE — PACK SURG LAP CHOLE

## (undated) DEVICE — TIP COVER ACCESSORY

## (undated) DEVICE — GLOVE SURG SZ 65 THK91MIL LTX FREE SYN POLYISOPRENE

## (undated) DEVICE — SYRINGE MED 10ML TRNSLUC BRL PLUNG BLK MRK POLYPR CTRL

## (undated) DEVICE — NEEDLE SPNL 25GA L3.5IN BLU HUB S STL PNCL PNT W/O INTRO

## (undated) DEVICE — Device

## (undated) DEVICE — CHLORAPREP 26ML ORANGE

## (undated) DEVICE — NEEDLE HYPO 23GA L1.5IN TURQ POLYPR HUB S STL THN WALL IM

## (undated) DEVICE — BLADELESS OBTURATOR: Brand: WECK VISTA

## (undated) DEVICE — EXCEL 10FT (3.05 M) INSUFFLATION TUBING SET WITH 0.1 MICRON FILTER: Brand: EXCEL

## (undated) DEVICE — CANNULA SEAL

## (undated) DEVICE — Z DISCONTINUED (USE MFG CAT MVABO)  TUBING GAS SAMPLING STD 6.5 FT FEMALE CONN SMRT CAPNOLINE

## (undated) DEVICE — TOWEL,OR,DSP,ST,BLUE,STD,6/PK,12PK/CS: Brand: MEDLINE

## (undated) DEVICE — LINER SUCT CANSTR 1500CC SEMI RIG W/ POR HYDROPHOBIC SHUT

## (undated) DEVICE — BAG SPEC REM 224ML W4XL6IN DIA10MM 1 HND GYN DISP ENDOPCH

## (undated) DEVICE — 34" SINGLE PATIENT USE HOVERMATT BREATHABLE: Brand: SINGLE PATIENT USE HOVERMATT

## (undated) DEVICE — DRAPE SHEET ULTRAGARD: Brand: MEDLINE

## (undated) DEVICE — GLOVE ORANGE PI 7   MSG9070

## (undated) DEVICE — POSITIONER HD AD W4.5XH8XL9IN HIGHLY RESILIENT FOAM CMFRT